# Patient Record
Sex: FEMALE | Race: WHITE | Employment: UNEMPLOYED | URBAN - METROPOLITAN AREA
[De-identification: names, ages, dates, MRNs, and addresses within clinical notes are randomized per-mention and may not be internally consistent; named-entity substitution may affect disease eponyms.]

---

## 2017-11-03 ENCOUNTER — HOSPITAL ENCOUNTER (EMERGENCY)
Age: 32
Discharge: HOME | End: 2017-11-03
Payer: SELF-PAY

## 2017-11-03 VITALS
BODY MASS INDEX: 27.77 KG/M2 | OXYGEN SATURATION: 97 % | RESPIRATION RATE: 16 BRPM | HEIGHT: 72 IN | DIASTOLIC BLOOD PRESSURE: 71 MMHG | TEMPERATURE: 97.7 F | HEART RATE: 105 BPM | SYSTOLIC BLOOD PRESSURE: 133 MMHG | WEIGHT: 205 LBS

## 2017-11-03 DIAGNOSIS — L03.115 CELLULITIS OF RIGHT LOWER EXTREMITY: Primary | ICD-10-CM

## 2017-11-03 PROCEDURE — 99283 EMERGENCY DEPT VISIT LOW MDM: CPT

## 2017-11-03 RX ORDER — METHADONE HYDROCHLORIDE 10 MG/1
30 TABLET ORAL
COMMUNITY
End: 2018-03-02

## 2017-11-03 RX ORDER — CLINDAMYCIN HYDROCHLORIDE 300 MG/1
300 CAPSULE ORAL 4 TIMES DAILY
Qty: 28 CAP | Refills: 0 | Status: SHIPPED | OUTPATIENT
Start: 2017-11-03 | End: 2017-11-10

## 2017-11-03 NOTE — ED PROVIDER NOTES
HPI Comments: 39-year-old female with redness to the right lateral leg from mid thigh to knee. onset 3 days ago. Patient injects methamphetamine track marks all over her body. Patient is a 34 y.o. female presenting with skin problem. The history is provided by the patient. Skin Problem    This is a new problem. The current episode started more than 2 days ago. The problem has been gradually worsening. Associated with: illicit drug injection. There has been no fever. The rash is present on the right upper leg. The pain is at a severity of 9/10. The pain is moderate. The pain has been constant since onset. Associated symptoms include pain. Treatments tried: left over Keflex. The treatment provided no relief. Past Medical History:   Diagnosis Date    Hypertension     Neurological disorder     migraines        Past Surgical History:   Procedure Laterality Date    HX APPENDECTOMY      HX GYN               History reviewed. No pertinent family history. Social History     Social History    Marital status:      Spouse name: N/A    Number of children: N/A    Years of education: N/A     Occupational History    Not on file. Social History Main Topics    Smoking status: Current Every Day Smoker     Packs/day: 1.00    Smokeless tobacco: Never Used    Alcohol use Yes      Comment: rarely    Drug use: Yes     Special: Heroin, Cocaine, Methamphetamines    Sexual activity: Yes     Partners: Female, Male     Birth control/ protection: Condom     Other Topics Concern    Not on file     Social History Narrative         ALLERGIES: Sulfa (sulfonamide antibiotics)    Review of Systems   Constitutional: Negative. Negative for activity change. HENT: Negative. Eyes: Negative. Respiratory: Negative. Cardiovascular: Negative. Gastrointestinal: Negative. Genitourinary: Negative. Musculoskeletal: Negative. Skin: Negative. Neurological: Negative. Psychiatric/Behavioral: Negative. All other systems reviewed and are negative. Vitals:    11/03/17 0217   BP: 156/90   Pulse: (!) 108   Resp: 16   Temp: 97.7 °F (36.5 °C)   SpO2: 99%   Weight: 93 kg (205 lb)   Height: 6' (1.829 m)            Physical Exam   Constitutional: She is oriented to person, place, and time. She appears well-developed and well-nourished. No distress. HENT:   Head: Normocephalic and atraumatic. Right Ear: External ear normal.   Left Ear: External ear normal.   Nose: Nose normal.   Mouth/Throat: Oropharynx is clear and moist. No oropharyngeal exudate. Eyes: Conjunctivae and EOM are normal. Pupils are equal, round, and reactive to light. Right eye exhibits no discharge. Left eye exhibits no discharge. No scleral icterus. Neck: Normal range of motion. Neck supple. No JVD present. No tracheal deviation present. Cardiovascular: Normal rate, regular rhythm and intact distal pulses. Pulmonary/Chest: Effort normal and breath sounds normal. No stridor. No respiratory distress. She has no wheezes. She exhibits no tenderness. Abdominal: Soft. Bowel sounds are normal. She exhibits no distension and no mass. There is no tenderness. Musculoskeletal: Normal range of motion. She exhibits tenderness. She exhibits no edema. Right knee: Tenderness found. Legs:  Neurological: She is alert and oriented to person, place, and time. No cranial nerve deficit. Skin: Skin is warm and dry. No rash noted. She is not diaphoretic. No erythema. No pallor. Psychiatric: She has a normal mood and affect. Her behavior is normal. Thought content normal.   Nursing note and vitals reviewed. MDM  Number of Diagnoses or Management Options  Cellulitis of right lower extremity:   Diagnosis management comments: Cellulitis does not seem to involve the knee joint. There is superficial this time. Multiple track marks up and down her legs. Patient admits to injecting methamphetamine. We'll treat for MRSA cellulitis with clindamycin as the patient has a sulfa allergy. Warm compress the leg. .  Due to the illicit drug use and habit of injecting drugs no pain medicine will be given at this time she may use Tylenol or Motrin.     ED Course       Procedures

## 2017-11-03 NOTE — DISCHARGE INSTRUCTIONS
Cellulitis: Care Instructions  Your Care Instructions    Cellulitis is a skin infection. It often occurs after a break in the skin from a scrape, cut, bite, or puncture, or after a rash. The doctor has checked you carefully, but problems can develop later. If you notice any problems or new symptoms, get medical treatment right away. Follow-up care is a key part of your treatment and safety. Be sure to make and go to all appointments, and call your doctor if you are having problems. It's also a good idea to know your test results and keep a list of the medicines you take. How can you care for yourself at home? · Take your antibiotics as directed. Do not stop taking them just because you feel better. You need to take the full course of antibiotics. · Prop up the infected area on pillows to reduce pain and swelling. Try to keep the area above the level of your heart as often as you can. · If your doctor told you how to care for your wound, follow your doctor's instructions. If you did not get instructions, follow this general advice:  ¨ Wash the wound with clean water 2 times a day. Don't use hydrogen peroxide or alcohol, which can slow healing. ¨ You may cover the wound with a thin layer of petroleum jelly, such as Vaseline, and a nonstick bandage. ¨ Apply more petroleum jelly and replace the bandage as needed. · Be safe with medicines. Take pain medicines exactly as directed. ¨ If the doctor gave you a prescription medicine for pain, take it as prescribed. ¨ If you are not taking a prescription pain medicine, ask your doctor if you can take an over-the-counter medicine. To prevent cellulitis in the future  · Try to prevent cuts, scrapes, or other injuries to your skin. Cellulitis most often occurs where there is a break in the skin. · If you get a scrape, cut, mild burn, or bite, wash the wound with clean water as soon as you can to help avoid infection.  Don't use hydrogen peroxide or alcohol, which can slow healing. · If you have swelling in your legs (edema), support stockings and good skin care may help prevent leg sores and cellulitis. · Take care of your feet, especially if you have diabetes or other conditions that increase the risk of infection. Wear shoes and socks. Do not go barefoot. If you have athlete's foot or other skin problems on your feet, talk to your doctor about how to treat them. When should you call for help? Call your doctor now or seek immediate medical care if:  ? · You have signs that your infection is getting worse, such as:  ¨ Increased pain, swelling, warmth, or redness. ¨ Red streaks leading from the area. ¨ Pus draining from the area. ¨ A fever. ? · You get a rash. ? Watch closely for changes in your health, and be sure to contact your doctor if:  ? · You are not getting better after 1 day (24 hours). ? · You do not get better as expected. Where can you learn more? Go to http://matt-yu.info/. [place] in the search box to learn more about \"Cellulitis: Care Instructions. \"  Current as of: October 13, 2016  Content Version: 11.4  © 9061-7462 [employer]. Care instructions adapted under license by [employer] (which disclaims liability or warranty for this information). If you have questions about a medical condition or this instruction, always ask your healthcare professional. [place] [address] any warranty or liability for your use of this information.

## 2017-11-03 NOTE — ED NOTES
I have reviewed discharge instructions with the patient. The patient verbalized understanding. Patient left ED via Discharge Method: wheelchair to Home with boyfriend    Opportunity for questions and clarification provided. Patient given 1 scripts.

## 2017-11-03 NOTE — ED TRIAGE NOTES
Pt states she \"does meth\" and believes that is the reason for the large area of redness and swelling on upper right leg

## 2017-11-08 ENCOUNTER — HOSPITAL ENCOUNTER (EMERGENCY)
Age: 32
Discharge: HOME | End: 2017-11-08
Attending: EMERGENCY MEDICINE
Payer: SELF-PAY

## 2017-11-08 VITALS
SYSTOLIC BLOOD PRESSURE: 147 MMHG | OXYGEN SATURATION: 99 % | TEMPERATURE: 97.7 F | HEIGHT: 72 IN | DIASTOLIC BLOOD PRESSURE: 84 MMHG | RESPIRATION RATE: 18 BRPM | HEART RATE: 112 BPM

## 2017-11-08 DIAGNOSIS — L02.415 ABSCESS OF RIGHT KNEE: Primary | ICD-10-CM

## 2017-11-08 PROCEDURE — 75810000289 HC I&D ABSCESS SIMP/COMP/MULT: Performed by: EMERGENCY MEDICINE

## 2017-11-08 PROCEDURE — 74011250637 HC RX REV CODE- 250/637: Performed by: EMERGENCY MEDICINE

## 2017-11-08 PROCEDURE — 99284 EMERGENCY DEPT VISIT MOD MDM: CPT | Performed by: EMERGENCY MEDICINE

## 2017-11-08 PROCEDURE — 77030019895 HC PCKNG STRP IODO -A

## 2017-11-08 RX ORDER — IBUPROFEN 800 MG/1
800 TABLET ORAL
Status: COMPLETED | OUTPATIENT
Start: 2017-11-08 | End: 2017-11-08

## 2017-11-08 RX ADMIN — IBUPROFEN 800 MG: 800 TABLET, FILM COATED ORAL at 01:59

## 2017-11-08 NOTE — DISCHARGE INSTRUCTIONS
Continue taking antibiotics. Recheck here in 2 days. Crutches to decrease weightbearing on right knee. Recheck sooner for high fever       Skin Abscess: Care Instructions  Your Care Instructions    A skin abscess is a bacterial infection that forms a pocket of pus. A boil is a kind of skin abscess. The doctor may have cut an opening in the abscess so that the pus can drain out. You may have gauze in the cut so that the abscess will stay open and keep draining. You may need antibiotics. You will need to follow up with your doctor to make sure the infection has gone away. The doctor has checked you carefully, but problems can develop later. If you notice any problems or new symptoms, get medical treatment right away. Follow-up care is a key part of your treatment and safety. Be sure to make and go to all appointments, and call your doctor if you are having problems. It's also a good idea to know your test results and keep a list of the medicines you take. How can you care for yourself at home? · Apply warm and dry compresses, a heating pad set on low, or a hot water bottle 3 or 4 times a day for pain. Keep a cloth between the heat source and your skin. · If your doctor prescribed antibiotics, take them as directed. Do not stop taking them just because you feel better. You need to take the full course of antibiotics. · Take pain medicines exactly as directed. ¨ If the doctor gave you a prescription medicine for pain, take it as prescribed. ¨ If you are not taking a prescription pain medicine, ask your doctor if you can take an over-the-counter medicine. · Keep your bandage clean and dry. Change the bandage whenever it gets wet or dirty, or at least one time a day. · If the abscess was packed with gauze:  ¨ Keep follow-up appointments to have the gauze changed or removed. If the doctor instructed you to remove the gauze, gently pull out all of the gauze when your doctor tells you to.   ¨ After the gauze is removed, soak the area in warm water for 15 to 20 minutes 2 times a day, until the wound closes. When should you call for help? Call your doctor now or seek immediate medical care if:  ? · You have signs of worsening infection, such as:  ¨ Increased pain, swelling, warmth, or redness. ¨ Red streaks leading from the infected skin. ¨ Pus draining from the wound. ¨ A fever. ? Watch closely for changes in your health, and be sure to contact your doctor if:  ? · You do not get better as expected. Where can you learn more? Go to http://matt-yu.info/. Enter S958 in the search box to learn more about \"Skin Abscess: Care Instructions. \"  Current as of: October 13, 2016  Content Version: 11.4  © 5601-3247 [employer]. Care instructions adapted under license by [employer] (which disclaims liability or warranty for this information). If you have questions about a medical condition or this instruction, always ask your healthcare professional. [address] any warranty or liability for your use of this information.

## 2017-11-08 NOTE — ED NOTES
I have reviewed discharge instructions with the patient. The patient verbalized understanding. Patient left ED via Discharge Method: ambulatory to Home with ( family, self, ). Opportunity for questions and clarification provided. Patient given 0 scripts.

## 2017-11-08 NOTE — ED PROVIDER NOTES
HPI Comments: 34-year-old female seen here a few days ago for cellulitis of her left leg suspected to be due to illicit drug use. Placed on antibiotics. She states she's taken these but she's had more pain and swelling. Patient is a 34 y.o. female presenting with skin problem. The history is provided by the patient. Skin Problem    This is a new problem. The current episode started more than 2 days ago. The problem has been gradually worsening. There has been no fever. The rash is present on the right upper leg. The pain is moderate. Associated symptoms include pain. Pertinent negatives include no blisters and no weeping. She has tried antibiotic for the symptoms. Past Medical History:   Diagnosis Date    Hypertension     Neurological disorder     migraines        Past Surgical History:   Procedure Laterality Date    HX APPENDECTOMY      HX GYN               History reviewed. No pertinent family history. Social History     Social History    Marital status:      Spouse name: N/A    Number of children: N/A    Years of education: N/A     Occupational History    Not on file. Social History Main Topics    Smoking status: Current Every Day Smoker     Packs/day: 1.00    Smokeless tobacco: Never Used    Alcohol use Yes      Comment: rarely    Drug use: Yes     Special: Heroin, Cocaine, Methamphetamines    Sexual activity: Yes     Partners: Female, Male     Birth control/ protection: Condom     Other Topics Concern    Not on file     Social History Narrative         ALLERGIES: Latex and Sulfa (sulfonamide antibiotics)    Review of Systems   Constitutional: Negative for chills and fever. HENT: Negative for ear pain. Cardiovascular: Negative for chest pain. Gastrointestinal: Negative for diarrhea and vomiting. Musculoskeletal: Negative for back pain and neck pain. Skin: Positive for wound. Negative for color change and rash.    All other systems reviewed and are negative. There were no vitals filed for this visit. Physical Exam   Constitutional: She appears well-developed and well-nourished. Musculoskeletal:        Legs:  Skin: Skin is warm and dry. Nursing note and vitals reviewed. MDM  Number of Diagnoses or Management Options  Diagnosis management comments: Apparent abscess    Risk of Complications, Morbidity, and/or Mortality  Presenting problems: low  Diagnostic procedures: minimal  Management options: low    Patient Progress  Patient progress: improved    ED Course       I&D Abcess Complex  Date/Time: 11/8/2017 1:47 AM  Performed by: Giana Roper  Authorized by: Giana Roper     Consent:     Consent obtained:  Verbal  Location:     Type:  Abscess    Location:  Lower extremity    Lower extremity location:  Knee    Knee location:  R knee  Pre-procedure details:     Skin preparation:  Betadine  Anesthesia (see MAR for exact dosages): Anesthesia method:  Local infiltration    Local anesthetic:  Lidocaine 1% w/o epi  Procedure type:     Complexity:  Complex  Procedure details:     Incision types:  Single straight    Wound management:  Probed and deloculated    Drainage:  Purulent    Drainage amount: Moderate    Wound treatment:  Wound left open    Packing materials:  1/4 in gauze  Post-procedure details:     Patient tolerance of procedure:   Tolerated well, no immediate complications

## 2017-11-08 NOTE — ED NOTES
Wound cleansed, neosporing placed. Non-stick dressing then extra wilton for reinforcement. Coban aplied.

## 2017-11-09 PROCEDURE — 99282 EMERGENCY DEPT VISIT SF MDM: CPT

## 2017-11-10 ENCOUNTER — HOSPITAL ENCOUNTER (EMERGENCY)
Age: 32
Discharge: HOME | End: 2017-11-10
Payer: SELF-PAY

## 2017-11-10 VITALS
HEART RATE: 112 BPM | HEIGHT: 72 IN | DIASTOLIC BLOOD PRESSURE: 87 MMHG | TEMPERATURE: 98.2 F | OXYGEN SATURATION: 100 % | RESPIRATION RATE: 20 BRPM | SYSTOLIC BLOOD PRESSURE: 144 MMHG | BODY MASS INDEX: 27.77 KG/M2 | WEIGHT: 205 LBS

## 2017-11-10 DIAGNOSIS — Z51.89 WOUND CHECK, ABSCESS: Primary | ICD-10-CM

## 2017-11-10 DIAGNOSIS — F19.90 ILLICIT DRUG USE, CONTINUOUS: ICD-10-CM

## 2017-11-10 RX ORDER — CLINDAMYCIN HYDROCHLORIDE 300 MG/1
300 CAPSULE ORAL 4 TIMES DAILY
Qty: 28 CAP | Refills: 0 | Status: SHIPPED | OUTPATIENT
Start: 2017-11-10 | End: 2017-11-17

## 2017-11-10 NOTE — DISCHARGE INSTRUCTIONS
Learning About Drug Misuse  What is drug misuse? Drug misuse means using drugs in a way that harms you or causes you to harm others. Your doctor may call it substance use disorder or drug abuse. It's possible to misuse almost any type of drug, including illegal drugs, prescription drugs, and over-the-counter drugs. An overdose happens when a person takes more than the normal or recommended amount of a drug. An overdose can result in harmful symptoms or death. Could you have a problem? If there's a chance you may be misusing drugs, it's important to find out. So ask yourself a few questions. · Do you spend a lot of time thinking about your medicine or other drug-getting it and using it? Has that taken the place of other things you used to enjoy? · Have you had problems with your family or friends, or at work, because of your use? · Do you use drugs even when you've told yourself you won't? Do you think you might have a problem? If you do, then you've just taken an important first step. Many people have overcome this problem. And most of them started by reaching out to others, like caring friends or family, their doctor, or a support group. How is drug misuse treated? If you think you may have a problem with drugs, talk to your doctor. You and your doctor can decide whether you have a problem and what type of treatment might help you. You may need to stay in a hospital at first, so that you can be treated for withdrawal symptoms. One of the goals of treatment is helping you get used to life without the drug. Counseling can help you prepare for people or situations that might tempt you to start using again. You can practice these skills through one-on-one counseling, family therapy, or group therapy. Therapy may be part of inpatient treatment, where you stay in a treatment center.  Or it may be part of outpatient treatment, where you can fit your therapy around your job or other responsibilities. Another goal of treatment is getting ongoing support for your drug-free life. Many people find support by going to meetings like Narcotics Anonymous or SMART Recovery. This type of support can help you feel less alone and more motivated to stay drug-free. You might talk to your doctor or do an online search for local treatment programs. Or you might tell a friend or loved one that you need help. Follow-up care is a key part of your treatment and safety. Be sure to make and go to all appointments, and call your doctor if you are having problems. It's also a good idea to know your test results and keep a list of the medicines you take. Where can you learn more? Go to http://matt-yu.info/. Enter [phone] in the search box to learn more about \"Learning About Drug Misuse. \"  Current as of: November 3, 2016  Content Version: 11.4  © 5079-3470 [employer]. Care instructions adapted under license by [employer] (which disclaims liability or warranty for this information). If you have questions about a medical condition or this instruction, always ask your healthcare professional. [place] [address] any warranty or liability for your use of this information.

## 2017-11-10 NOTE — ED TRIAGE NOTES
Pt was seen and treated on Tuesday and I and D of an abscess on the right knee done.   Here today for packing removal

## 2017-11-10 NOTE — ED PROVIDER NOTES
HPI Comments: 53-year-old female here for wound check. Patient is a illicit drug use or that injects in multiple sites on her body. She had gotten cellulitis which then developed into an abscess. Patient had a I&D done 2 days ago and is here for packing removal and repacking. Patient is a 34 y.o. female presenting with wound check. The history is provided by the patient. Wound Check    This is a recurrent problem. The current episode started more than 2 days ago. The problem occurs constantly. The problem has been gradually improving. The pain is present in the right knee. The pain is at a severity of 4/10. The pain is mild. Pertinent negatives include no numbness and full range of motion. She has tried nothing for the symptoms. Past Medical History:   Diagnosis Date    Hypertension     Neurological disorder     migraines        Past Surgical History:   Procedure Laterality Date    HX APPENDECTOMY      HX GYN               History reviewed. No pertinent family history. Social History     Social History    Marital status:      Spouse name: N/A    Number of children: N/A    Years of education: N/A     Occupational History    Not on file. Social History Main Topics    Smoking status: Current Every Day Smoker     Packs/day: 1.00    Smokeless tobacco: Never Used    Alcohol use Yes      Comment: rarely    Drug use: Yes     Special: Heroin, Cocaine, Methamphetamines    Sexual activity: Yes     Partners: Female, Male     Birth control/ protection: Condom     Other Topics Concern    Not on file     Social History Narrative         ALLERGIES: Latex and Sulfa (sulfonamide antibiotics)    Review of Systems   Constitutional: Negative. Negative for activity change. HENT: Negative. Eyes: Negative. Respiratory: Negative. Cardiovascular: Negative. Gastrointestinal: Negative. Genitourinary: Negative. Musculoskeletal: Negative. Skin: Negative.     Neurological: Negative. Negative for numbness. Psychiatric/Behavioral: Negative. All other systems reviewed and are negative. Vitals:    11/10/17 0003   BP: 144/87   Pulse: (!) 112   Resp: 20   Temp: 98.2 °F (36.8 °C)   SpO2: 100%   Weight: 93 kg (205 lb)   Height: 6' (1.829 m)            Physical Exam   Constitutional: She is oriented to person, place, and time. She appears well-developed and well-nourished. No distress. HENT:   Head: Normocephalic and atraumatic. Right Ear: External ear normal.   Left Ear: External ear normal.   Nose: Nose normal.   Mouth/Throat: Oropharynx is clear and moist. No oropharyngeal exudate. Eyes: Conjunctivae and EOM are normal. Pupils are equal, round, and reactive to light. Right eye exhibits no discharge. Left eye exhibits no discharge. No scleral icterus. Neck: Normal range of motion. Neck supple. No JVD present. No tracheal deviation present. Cardiovascular: Normal rate, regular rhythm and intact distal pulses. Pulmonary/Chest: Effort normal and breath sounds normal. No stridor. No respiratory distress. She has no wheezes. She exhibits no tenderness. Abdominal: Soft. Bowel sounds are normal. She exhibits no distension and no mass. There is no tenderness. Musculoskeletal: Normal range of motion. She exhibits no edema or tenderness. Neurological: She is alert and oriented to person, place, and time. No cranial nerve deficit. Skin: Skin is warm and dry. No rash noted. She is not diaphoretic. There is erythema. No pallor. Psychiatric: She has a normal mood and affect. Her behavior is normal. Thought content normal.   Nursing note and vitals reviewed. MDM  Number of Diagnoses or Management Options  Wound check, abscess: established and worsening  Diagnosis management comments: Packing  Removed wound irrigated anesthesia injected wound repacked. Return in 3 days for recheck. Discussion with patient about her illicit drug use. She has not quit using. She used today. She is not interested this point and rehabilitation. Patient has multiple excuses. Patient positive continued to engage in this activity she will eventually lead to her demise.     ED Course       Procedures

## 2017-11-10 NOTE — ED NOTES
I have reviewed discharge instructions with the patient. The patient verbalized understanding. Patient left ED via Discharge Method: ambulatory to Home with alone). Opportunity for questions and clarification provided. Patient given 1 scripts.

## 2017-11-13 ENCOUNTER — HOSPITAL ENCOUNTER (EMERGENCY)
Age: 32
Discharge: HOME | End: 2017-11-13
Attending: EMERGENCY MEDICINE
Payer: SELF-PAY

## 2017-11-13 VITALS
DIASTOLIC BLOOD PRESSURE: 75 MMHG | SYSTOLIC BLOOD PRESSURE: 146 MMHG | OXYGEN SATURATION: 99 % | RESPIRATION RATE: 16 BRPM | WEIGHT: 205 LBS | HEIGHT: 71 IN | HEART RATE: 120 BPM | BODY MASS INDEX: 28.7 KG/M2 | TEMPERATURE: 97.9 F

## 2017-11-13 DIAGNOSIS — Z51.89 WOUND CHECK, ABSCESS: Primary | ICD-10-CM

## 2017-11-13 DIAGNOSIS — F19.90 ACTIVE INTRAVENOUS DRUG USE: ICD-10-CM

## 2017-11-13 PROCEDURE — 99282 EMERGENCY DEPT VISIT SF MDM: CPT | Performed by: EMERGENCY MEDICINE

## 2017-11-13 NOTE — ED PROVIDER NOTES
HPI Comments: 65-year-old female with history of heroin use as well as abscess to right lateral knee. She has been treated with antibiotics and have the wound repacked 3 days ago. He is she is here for packing removal.  Denies any significant fevers, vomiting. Overall she states the wound is looking improved with no evidence of cellulitis. Patient is a 34 y.o. female presenting with wound check. The history is provided by the patient. No  was used. Wound Check    Pertinent negatives include no back pain. Past Medical History:   Diagnosis Date    Anxiety     Hypertension     Methamphetamine abuse     Neurological disorder     migraines     Polysubstance abuse        Past Surgical History:   Procedure Laterality Date    HX APPENDECTOMY      HX GYN               No family history on file. Social History     Social History    Marital status:      Spouse name: N/A    Number of children: N/A    Years of education: N/A     Occupational History    Not on file. Social History Main Topics    Smoking status: Current Every Day Smoker     Packs/day: 1.00    Smokeless tobacco: Never Used    Alcohol use Yes      Comment: rarely    Drug use: Yes     Special: Heroin, Cocaine, Methamphetamines    Sexual activity: Yes     Partners: Female, Male     Birth control/ protection: Condom     Other Topics Concern    Not on file     Social History Narrative         ALLERGIES: Latex and Sulfa (sulfonamide antibiotics)    Review of Systems   Constitutional: Negative for fatigue and fever. HENT: Negative for sore throat. Eyes: Negative for pain. Respiratory: Negative for cough, chest tightness and shortness of breath. Cardiovascular: Negative for leg swelling. Gastrointestinal: Negative for abdominal pain. Genitourinary: Negative for dysuria. Musculoskeletal: Negative for back pain. Skin: Positive for wound.    Neurological: Negative for syncope and headaches. Vitals:    11/13/17 0450   BP: 146/75   Pulse: (!) 120   Resp: 16   Temp: 97.9 °F (36.6 °C)   SpO2: 99%   Weight: 93 kg (205 lb)   Height: 5' 11\" (1.803 m)            Physical Exam   Constitutional: She is oriented to person, place, and time. She appears well-developed and well-nourished. No distress. HENT:   Head: Normocephalic and atraumatic. Eyes: Conjunctivae and EOM are normal. Pupils are equal, round, and reactive to light. Neck: Normal range of motion. Neck supple. Cardiovascular: Normal rate, regular rhythm and normal heart sounds. Pulmonary/Chest: Effort normal and breath sounds normal. No respiratory distress. She has no wheezes. She has no rales. Abdominal: Soft. She exhibits no distension. There is no tenderness. There is no rebound. Musculoskeletal: Normal range of motion. She exhibits no edema or tenderness. Legs:  Neurological: She is alert and oriented to person, place, and time. Skin: Skin is warm and dry. No rash noted. She is not diaphoretic. Psychiatric: She has a normal mood and affect. Her behavior is normal.   Vitals reviewed. MDM  Number of Diagnoses or Management Options  Active intravenous drug use: new and does not require workup  Wound check, abscess: new and does not require workup  Diagnosis management comments: Patient has no systemic features of infection. She does not appear toxic. Wound seems to be healing nicely. She has clindamycin she reports she is currently taking. We will discharge to home. Advised to try to stop IV drug use if possible. She has tried to quit multiple times in the past unsuccessfully. She does not want a referral for a rehabilitation facility at this time.   Return precautions discussed       Amount and/or Complexity of Data Reviewed  Review and summarize past medical records: yes    Risk of Complications, Morbidity, and/or Mortality  Presenting problems: low  Diagnostic procedures: low  Management options: low    Patient Progress  Patient progress: improved    ED Course       Procedures

## 2017-11-13 NOTE — ED NOTES
I have reviewed discharge instructions with the patient. The patient verbalized understanding. Patient left ED via Discharge Method: ambulatory to Home). Opportunity for questions and clarification provided. Patient given 0 scripts.

## 2017-11-13 NOTE — ED TRIAGE NOTES
Pt presents to ER for wound to R knee recheck w/ packing removal, pt reports missing yesterday's Ax due to being in long-term.

## 2018-03-02 ENCOUNTER — HOSPITAL ENCOUNTER (EMERGENCY)
Age: 33
Discharge: HOME | End: 2018-03-02
Payer: SELF-PAY

## 2018-03-02 VITALS
TEMPERATURE: 100.6 F | HEIGHT: 72 IN | WEIGHT: 213.2 LBS | BODY MASS INDEX: 28.88 KG/M2 | DIASTOLIC BLOOD PRESSURE: 67 MMHG | HEART RATE: 88 BPM | RESPIRATION RATE: 24 BRPM | OXYGEN SATURATION: 99 % | SYSTOLIC BLOOD PRESSURE: 127 MMHG

## 2018-03-02 DIAGNOSIS — J20.9 ACUTE BRONCHITIS, UNSPECIFIED ORGANISM: Primary | ICD-10-CM

## 2018-03-02 PROCEDURE — 77030013140 HC MSK NEB VYRM -A

## 2018-03-02 PROCEDURE — 99283 EMERGENCY DEPT VISIT LOW MDM: CPT

## 2018-03-02 PROCEDURE — 74011636637 HC RX REV CODE- 636/637

## 2018-03-02 PROCEDURE — 74011000250 HC RX REV CODE- 250

## 2018-03-02 PROCEDURE — 94640 AIRWAY INHALATION TREATMENT: CPT

## 2018-03-02 RX ORDER — PREDNISONE 10 MG/1
TABLET ORAL
Qty: 21 TAB | Refills: 0 | Status: SHIPPED | OUTPATIENT
Start: 2018-03-02 | End: 2018-08-30 | Stop reason: CLARIF

## 2018-03-02 RX ORDER — BENZONATATE 100 MG/1
100 CAPSULE ORAL
Qty: 30 CAP | Refills: 0 | Status: SHIPPED | OUTPATIENT
Start: 2018-03-02 | End: 2018-03-09

## 2018-03-02 RX ORDER — IPRATROPIUM BROMIDE AND ALBUTEROL SULFATE 2.5; .5 MG/3ML; MG/3ML
3 SOLUTION RESPIRATORY (INHALATION)
Status: COMPLETED | OUTPATIENT
Start: 2018-03-02 | End: 2018-03-02

## 2018-03-02 RX ORDER — AZITHROMYCIN 250 MG/1
TABLET, FILM COATED ORAL
Qty: 6 TAB | Refills: 0 | Status: SHIPPED | OUTPATIENT
Start: 2018-03-02 | End: 2018-03-07

## 2018-03-02 RX ORDER — PREDNISONE 20 MG/1
60 TABLET ORAL
Status: COMPLETED | OUTPATIENT
Start: 2018-03-02 | End: 2018-03-02

## 2018-03-02 RX ADMIN — IPRATROPIUM BROMIDE AND ALBUTEROL SULFATE 3 ML: 2.5; .5 SOLUTION RESPIRATORY (INHALATION) at 02:18

## 2018-03-02 RX ADMIN — PREDNISONE 60 MG: 20 TABLET ORAL at 02:57

## 2018-03-02 NOTE — DISCHARGE INSTRUCTIONS
Bronchitis: Care Instructions  Your Care Instructions    Bronchitis is inflammation of the bronchial tubes, which carry air to the lungs. The tubes swell and produce mucus, or phlegm. The mucus and inflamed bronchial tubes make you cough. You may have trouble breathing. Most cases of bronchitis are caused by viruses like those that cause colds. Antibiotics usually do not help and they may be harmful. Bronchitis usually develops rapidly and lasts about 2 to 3 weeks in otherwise healthy people. Follow-up care is a key part of your treatment and safety. Be sure to make and go to all appointments, and call your doctor if you are having problems. It's also a good idea to know your test results and keep a list of the medicines you take. How can you care for yourself at home? · Take all medicines exactly as prescribed. Call your doctor if you think you are having a problem with your medicine. · Get some extra rest.  · Take an over-the-counter pain medicine, such as acetaminophen (Tylenol), ibuprofen (Advil, Motrin), or naproxen (Aleve) to reduce fever and relieve body aches. Read and follow all instructions on the label. · Do not take two or more pain medicines at the same time unless the doctor told you to. Many pain medicines have acetaminophen, which is Tylenol. Too much acetaminophen (Tylenol) can be harmful. · Take an over-the-counter cough medicine that contains dextromethorphan to help quiet a dry, hacking cough so that you can sleep. Avoid cough medicines that have more than one active ingredient. Read and follow all instructions on the label. · Breathe moist air from a humidifier, hot shower, or sink filled with hot water. The heat and moisture will thin mucus so you can cough it out. · Do not smoke. Smoking can make bronchitis worse. If you need help quitting, talk to your doctor about stop-smoking programs and medicines. These can increase your chances of quitting for good.   When should you call for help? Call 911 anytime you think you may need emergency care. For example, call if:  ? · You have severe trouble breathing. ?Call your doctor now or seek immediate medical care if:  ? · You have new or worse trouble breathing. ? · You cough up dark brown or bloody mucus (sputum). ? · You have a new or higher fever. ? · You have a new rash. ? Watch closely for changes in your health, and be sure to contact your doctor if:  ? · You cough more deeply or more often, especially if you notice more mucus or a change in the color of your mucus. ? · You are not getting better as expected. Where can you learn more? Go to http://matt-yu.info/. Enter H333 in the search box to learn more about \"Bronchitis: Care Instructions. \"  Current as of: May 12, 2017  Content Version: 11.4  © 9270-0959 [employer]. Care instructions adapted under license by [employer] (which disclaims liability or warranty for this information). If you have questions about a medical condition or this instruction, always ask your healthcare professional. [address] any warranty or liability for your use of this information.

## 2018-03-02 NOTE — ED PROVIDER NOTES
HPI Comments: 69-year-old female with cough congestion and flulike symptoms. Onset 3 weeks. Patient was in the ED with her boyfriend is being seen as a patient decided she get checked today 2. Patient is a 34 y.o. female presenting with cough. Cough   This is a chronic problem. The current episode started more than 1 week ago. The problem occurs constantly. The problem has not changed since onset. The cough is productive of sputum. There has been no fever. Associated symptoms include myalgias, shortness of breath and wheezing. Pertinent negatives include no chest pain. She has tried nothing for the symptoms. She is a smoker. Past Medical History:   Diagnosis Date    Anxiety     Hypertension     Methamphetamine abuse     Neurological disorder     migraines     Polysubstance abuse        Past Surgical History:   Procedure Laterality Date    HX APPENDECTOMY      HX GYN               No family history on file. Social History     Social History    Marital status:      Spouse name: N/A    Number of children: N/A    Years of education: N/A     Occupational History    Not on file. Social History Main Topics    Smoking status: Current Every Day Smoker     Packs/day: 1.00    Smokeless tobacco: Never Used    Alcohol use Yes      Comment: rarely    Drug use: Yes     Special: Heroin, Cocaine, Methamphetamines    Sexual activity: Yes     Partners: Female, Male     Birth control/ protection: Condom     Other Topics Concern    Not on file     Social History Narrative         ALLERGIES: Latex and Sulfa (sulfonamide antibiotics)    Review of Systems   Constitutional: Negative. Negative for activity change. HENT: Negative. Eyes: Negative. Respiratory: Positive for cough, shortness of breath and wheezing. Cardiovascular: Negative. Negative for chest pain. Gastrointestinal: Negative. Genitourinary: Negative. Musculoskeletal: Positive for myalgias.    Skin: Negative. Neurological: Negative. Psychiatric/Behavioral: Negative. All other systems reviewed and are negative. There were no vitals filed for this visit. Physical Exam   Constitutional: She is oriented to person, place, and time. She appears well-developed and well-nourished. No distress. HENT:   Head: Normocephalic and atraumatic. Right Ear: External ear normal.   Left Ear: External ear normal.   Nose: Nose normal.   Mouth/Throat: Oropharynx is clear and moist. No oropharyngeal exudate. Eyes: Conjunctivae and EOM are normal. Pupils are equal, round, and reactive to light. Right eye exhibits no discharge. Left eye exhibits no discharge. No scleral icterus. Neck: Normal range of motion. Neck supple. No JVD present. No tracheal deviation present. Cardiovascular: Normal rate, regular rhythm and intact distal pulses. Pulmonary/Chest: Effort normal. No stridor. No respiratory distress. She has wheezes in the right middle field. She exhibits no tenderness. Abdominal: Soft. Bowel sounds are normal. She exhibits no distension and no mass. There is no tenderness. Musculoskeletal: Normal range of motion. She exhibits no edema or tenderness. Neurological: She is alert and oriented to person, place, and time. No cranial nerve deficit. Skin: Skin is warm and dry. No rash noted. She is not diaphoretic. No erythema. No pallor. Psychiatric: She has a normal mood and affect. Her behavior is normal. Thought content normal.   Nursing note and vitals reviewed. MDM  Number of Diagnoses or Management Options  Diagnosis management comments: Assessment acute bronchitis chronic smoking. Plan Zithromax steroids  Cough medicine.         ED Course       Procedures

## 2018-03-02 NOTE — ED NOTES
I have reviewed discharge instructions with the patient. The patient verbalized understanding. Patient left ED via Discharge Method: ambulatory to Home. Opportunity for questions and clarification provided. Patient given 3 scripts. To continue your aftercare when you leave the hospital, you may receive an automated call from our care team to check in on how you are doing. This is a free service and part of our promise to provide the best care and service to meet your aftercare needs.  If you have questions, or wish to unsubscribe from this service please call [phone]. Thank you for Choosing our [facility].

## 2018-03-11 ENCOUNTER — HOSPITAL ENCOUNTER (EMERGENCY)
Age: 33
Discharge: HOME | End: 2018-03-11
Attending: EMERGENCY MEDICINE
Payer: SELF-PAY

## 2018-03-11 ENCOUNTER — APPOINTMENT (OUTPATIENT)
Dept: GENERAL RADIOLOGY | Age: 33
End: 2018-03-11
Attending: EMERGENCY MEDICINE
Payer: SELF-PAY

## 2018-03-11 VITALS
RESPIRATION RATE: 18 BRPM | WEIGHT: 213 LBS | DIASTOLIC BLOOD PRESSURE: 76 MMHG | TEMPERATURE: 98.4 F | HEIGHT: 72 IN | HEART RATE: 109 BPM | OXYGEN SATURATION: 100 % | SYSTOLIC BLOOD PRESSURE: 142 MMHG | BODY MASS INDEX: 28.85 KG/M2

## 2018-03-11 DIAGNOSIS — R91.8 PULMONARY INFILTRATES ON CXR: ICD-10-CM

## 2018-03-11 DIAGNOSIS — R05.9 COUGH: Primary | ICD-10-CM

## 2018-03-11 LAB
ALBUMIN SERPL-MCNC: 2.7 G/DL (ref 3.5–5)
ALBUMIN/GLOB SERPL: 0.6 {RATIO} (ref 1.2–3.5)
ALP SERPL-CCNC: 100 U/L (ref 50–136)
ALT SERPL-CCNC: 32 U/L (ref 12–65)
ANION GAP SERPL CALC-SCNC: 11 MMOL/L (ref 7–16)
AST SERPL-CCNC: 26 U/L (ref 15–37)
BASOPHILS # BLD: 0 K/UL (ref 0–0.2)
BASOPHILS NFR BLD: 0 % (ref 0–2)
BILIRUB SERPL-MCNC: 0.5 MG/DL (ref 0.2–1.1)
BNP SERPL-MCNC: 580 PG/ML
BUN SERPL-MCNC: 9 MG/DL (ref 6–23)
CALCIUM SERPL-MCNC: 8.9 MG/DL (ref 8.3–10.4)
CHLORIDE SERPL-SCNC: 103 MMOL/L (ref 98–107)
CO2 SERPL-SCNC: 25 MMOL/L (ref 21–32)
CREAT SERPL-MCNC: 0.92 MG/DL (ref 0.6–1)
DIFFERENTIAL METHOD BLD: ABNORMAL
EOSINOPHIL # BLD: 0.1 K/UL (ref 0–0.8)
EOSINOPHIL NFR BLD: 1 % (ref 0.5–7.8)
ERYTHROCYTE [DISTWIDTH] IN BLOOD BY AUTOMATED COUNT: 14.8 % (ref 11.9–14.6)
GLOBULIN SER CALC-MCNC: 4.6 G/DL (ref 2.3–3.5)
GLUCOSE SERPL-MCNC: 185 MG/DL (ref 65–100)
HCT VFR BLD AUTO: 35.7 % (ref 35.8–46.3)
HGB BLD-MCNC: 11.3 G/DL (ref 11.7–15.4)
IMM GRANULOCYTES # BLD: 0 K/UL (ref 0–0.5)
IMM GRANULOCYTES NFR BLD AUTO: 0 % (ref 0–5)
LYMPHOCYTES # BLD: 1.5 K/UL (ref 0.5–4.6)
LYMPHOCYTES NFR BLD: 14 % (ref 13–44)
MCH RBC QN AUTO: 26 PG (ref 26.1–32.9)
MCHC RBC AUTO-ENTMCNC: 31.7 G/DL (ref 31.4–35)
MCV RBC AUTO: 82.1 FL (ref 79.6–97.8)
MONOCYTES # BLD: 0.6 K/UL (ref 0.1–1.3)
MONOCYTES NFR BLD: 5 % (ref 4–12)
NEUTS SEG # BLD: 8.5 K/UL (ref 1.7–8.2)
NEUTS SEG NFR BLD: 80 % (ref 43–78)
PLATELET # BLD AUTO: 340 K/UL (ref 150–450)
PMV BLD AUTO: 9.1 FL (ref 10.8–14.1)
POTASSIUM SERPL-SCNC: 4.4 MMOL/L (ref 3.5–5.1)
PROT SERPL-MCNC: 7.3 G/DL (ref 6.3–8.2)
RBC # BLD AUTO: 4.35 M/UL (ref 4.05–5.25)
SODIUM SERPL-SCNC: 139 MMOL/L (ref 136–145)
WBC # BLD AUTO: 10.8 K/UL (ref 4.3–11.1)

## 2018-03-11 PROCEDURE — 71046 X-RAY EXAM CHEST 2 VIEWS: CPT

## 2018-03-11 PROCEDURE — 80053 COMPREHEN METABOLIC PANEL: CPT | Performed by: EMERGENCY MEDICINE

## 2018-03-11 PROCEDURE — 94640 AIRWAY INHALATION TREATMENT: CPT

## 2018-03-11 PROCEDURE — 74011000250 HC RX REV CODE- 250: Performed by: EMERGENCY MEDICINE

## 2018-03-11 PROCEDURE — 99283 EMERGENCY DEPT VISIT LOW MDM: CPT | Performed by: EMERGENCY MEDICINE

## 2018-03-11 PROCEDURE — 94664 DEMO&/EVAL PT USE INHALER: CPT

## 2018-03-11 PROCEDURE — 85025 COMPLETE CBC W/AUTO DIFF WBC: CPT | Performed by: EMERGENCY MEDICINE

## 2018-03-11 PROCEDURE — 83880 ASSAY OF NATRIURETIC PEPTIDE: CPT | Performed by: EMERGENCY MEDICINE

## 2018-03-11 RX ORDER — CEPHALEXIN 500 MG/1
500 CAPSULE ORAL 3 TIMES DAILY
Qty: 30 CAP | Refills: 0 | Status: SHIPPED | OUTPATIENT
Start: 2018-03-11 | End: 2018-08-30 | Stop reason: CLARIF

## 2018-03-11 RX ORDER — ALBUTEROL SULFATE 90 UG/1
2 AEROSOL, METERED RESPIRATORY (INHALATION)
Qty: 1 INHALER | Refills: 0 | Status: SHIPPED | OUTPATIENT
Start: 2018-03-11 | End: 2018-08-30 | Stop reason: CLARIF

## 2018-03-11 RX ORDER — IPRATROPIUM BROMIDE AND ALBUTEROL SULFATE 2.5; .5 MG/3ML; MG/3ML
3 SOLUTION RESPIRATORY (INHALATION)
Status: COMPLETED | OUTPATIENT
Start: 2018-03-11 | End: 2018-03-11

## 2018-03-11 RX ADMIN — IPRATROPIUM BROMIDE AND ALBUTEROL SULFATE 3 ML: 2.5; .5 SOLUTION RESPIRATORY (INHALATION) at 10:46

## 2018-03-11 NOTE — ED NOTES
I have reviewed discharge instructions with the patient. The patient verbalized understanding. Patient left ED via Discharge Method: ambulatory to Home with self. Opportunity for questions and clarification provided. Patient given 2 scripts. To continue your aftercare when you leave the hospital, you may receive an automated call from our care team to check in on how you are doing. This is a free service and part of our promise to provide the best care and service to meet your aftercare needs.  If you have questions, or wish to unsubscribe from this service please call [phone]. Thank you for Choosing our [facility] Emergency Department.

## 2018-03-11 NOTE — ED PROVIDER NOTES
HPI Comments: 28-year-old female states she's been coughing for about a month. Bring up yellow sputum. She's had increasing shortness of breath but possibly a little bit of fever and some wheezing over the last week. No vomiting or diarrhea. .  No hemoptysis no chest or back pain. No swelling. States remote history of hypertension but not needing any medications now. Patient is a 34 y.o. female presenting with cough. The history is provided by the patient. Cough   This is a new problem. The current episode started more than 1 week ago. The problem occurs every few minutes. The cough is productive of sputum. There has been no fever. Associated symptoms include chills, shortness of breath and wheezing. Pertinent negatives include no chest pain, no sweats, no headaches, no nausea and no vomiting. She has tried steroids and antibiotics for the symptoms. She is a smoker. Her past medical history does not include pneumonia, COPD, asthma or CHF. Past Medical History:   Diagnosis Date    Anxiety     Hypertension     Methamphetamine abuse     Neurological disorder     migraines     Polysubstance abuse        Past Surgical History:   Procedure Laterality Date    HX APPENDECTOMY      HX GYN               No family history on file. Social History     Social History    Marital status:      Spouse name: N/A    Number of children: N/A    Years of education: N/A     Occupational History    Not on file.      Social History Main Topics    Smoking status: Current Every Day Smoker     Packs/day: 1.00    Smokeless tobacco: Never Used    Alcohol use Yes      Comment: rarely    Drug use: Yes     Special: Heroin, Cocaine, Methamphetamines    Sexual activity: Yes     Partners: Female, Male     Birth control/ protection: Condom     Other Topics Concern    Not on file     Social History Narrative         ALLERGIES: Latex and Sulfa (sulfonamide antibiotics)    Review of Systems Constitutional: Positive for chills. Negative for fever. Respiratory: Positive for cough, shortness of breath and wheezing. Cardiovascular: Negative for chest pain and palpitations. Gastrointestinal: Negative for abdominal pain, diarrhea, nausea and vomiting. Genitourinary: Negative for dysuria and flank pain. Musculoskeletal: Negative for back pain and neck pain. Skin: Negative for color change and rash. Neurological: Negative for syncope and headaches. All other systems reviewed and are negative. Vitals:    03/11/18 1003   BP: 147/75   Pulse: (!) 119   Resp: 20   Temp: 98.4 °F (36.9 °C)   SpO2: 99%   Weight: 96.6 kg (213 lb)   Height: 6' (1.829 m)            Physical Exam   Constitutional: She is oriented to person, place, and time. She appears well-developed and well-nourished. No distress. HENT:   Head: Normocephalic and atraumatic. Mouth/Throat: Oropharynx is clear and moist. No oropharyngeal exudate. Eyes: Conjunctivae and EOM are normal. Pupils are equal, round, and reactive to light. Neck: Normal range of motion. Neck supple. Cardiovascular: Normal rate, regular rhythm and intact distal pulses. No murmur heard. Pulmonary/Chest: No respiratory distress. She has wheezes (very scattered with occasional rhonchi). Abdominal: No hernia. Neurological: She is alert and oriented to person, place, and time. Gait normal.   Nl speech   Skin: Skin is warm and dry. Some skin lesions could be consistent with meth and IV drug abuse   Psychiatric: She has a normal mood and affect. Her speech is normal.   Nursing note and vitals reviewed. MDM  Number of Diagnoses or Management Options  Diagnosis management comments: Check chest x-ray. The period screening blood work. Suspect some persistent inflammation. Check for him congestive heart failure or septic emboli given history of IV drug abuse.        Amount and/or Complexity of Data Reviewed  Clinical lab tests: ordered and reviewed  Tests in the radiology section of CPT®: ordered and reviewed  Independent visualization of images, tracings, or specimens: yes    Risk of Complications, Morbidity, and/or Mortality  Presenting problems: moderate  Diagnostic procedures: minimal  Management options: low    Patient Progress  Patient progress: stable        ED Course       Procedures      Results Include:    Recent Results (from the past 24 hour(s))   CBC WITH AUTOMATED DIFF    Collection Time: 03/11/18 10:42 AM   Result Value Ref Range    WBC 10.8 4.3 - 11.1 K/uL    RBC 4.35 4.05 - 5.25 M/uL    HGB 11.3 (L) 11.7 - 15.4 g/dL    HCT 35.7 (L) 35.8 - 46.3 %    MCV 82.1 79.6 - 97.8 FL    MCH 26.0 (L) 26.1 - 32.9 PG    MCHC 31.7 31.4 - 35.0 g/dL    RDW 14.8 (H) 11.9 - 14.6 %    PLATELET 744 146 - 954 K/uL    MPV 9.1 (L) 10.8 - 14.1 FL    DF AUTOMATED      NEUTROPHILS 80 (H) 43 - 78 %    LYMPHOCYTES 14 13 - 44 %    MONOCYTES 5 4.0 - 12.0 %    EOSINOPHILS 1 0.5 - 7.8 %    BASOPHILS 0 0.0 - 2.0 %    IMMATURE GRANULOCYTES 0 0.0 - 5.0 %    ABS. NEUTROPHILS 8.5 (H) 1.7 - 8.2 K/UL    ABS. LYMPHOCYTES 1.5 0.5 - 4.6 K/UL    ABS. MONOCYTES 0.6 0.1 - 1.3 K/UL    ABS. EOSINOPHILS 0.1 0.0 - 0.8 K/UL    ABS. BASOPHILS 0.0 0.0 - 0.2 K/UL    ABS. IMM. GRANS. 0.0 0.0 - 0.5 K/UL   METABOLIC PANEL, COMPREHENSIVE    Collection Time: 03/11/18 10:42 AM   Result Value Ref Range    Sodium 139 136 - 145 mmol/L    Potassium 4.4 3.5 - 5.1 mmol/L    Chloride 103 98 - 107 mmol/L    CO2 25 21 - 32 mmol/L    Anion gap 11 7 - 16 mmol/L    Glucose 185 (H) 65 - 100 mg/dL    BUN 9 6 - 23 MG/DL    Creatinine 0.92 0.6 - 1.0 MG/DL    GFR est AA >60 >60 ml/min/1.73m2    GFR est non-AA >60 >60 ml/min/1.73m2    Calcium 8.9 8.3 - 10.4 MG/DL    Bilirubin, total 0.5 0.2 - 1.1 MG/DL    ALT (SGPT) 32 12 - 65 U/L    AST (SGOT) 26 15 - 37 U/L    Alk.  phosphatase 100 50 - 136 U/L    Protein, total 7.3 6.3 - 8.2 g/dL    Albumin 2.7 (L) 3.5 - 5.0 g/dL    Globulin 4.6 (H) 2.3 - 3.5 g/dL    A-G Ratio 0.6 (L) 1.2 - 3.5     BNP    Collection Time: 03/11/18 10:42 AM   Result Value Ref Range     pg/mL     Xr Chest Pa Lat    Result Date: 3/11/2018  Chest 2 view CLINICAL INDICATION: One month of persisting cough with acute severe dyspnea today, recent TB exposure COMPARISON: None TECHNIQUE: Upright PA and lateral views of the chest FINDINGS: Lung volumes are well inflated. Cardiac silhouette is borderline enlarged. Otherwise mediastinal and hilar contours within normal limits. The lungs demonstrate no dense consolidation, mass, pneumothorax, cavitary lesions, pleural effusions or pulmonary edema. There is mild reticular and groundglass opacity in both bases. No acute osseous abnormalities are seen. IMPRESSION: 1. No consolidation or evidence of active tuberculosis by radiograph. 2. Mild bibasilar infiltrates. More likely some infection as opposed to congestive heart failure. Will have patient follow with cardiology for consideration of echocardiogram.  I will ask  patient to call health Department since she was exposed to see about TB testing area patient and his primary care doctor.

## 2018-03-11 NOTE — DISCHARGE INSTRUCTIONS
No signs of tuberculosis. Call health Department to see if he can have a TB test done since she were exposed to TB. Important to get a primary care doctor. Call here to speak with our  during weekday hours at [phone]. Also keep appointment with cardiology when you're called to recheck on your heart. Rejection or for shortness of breath high fever or pain. Cough: Care Instructions  Your Care Instructions    A cough is your body's response to something that bothers your throat or airways. Many things can cause a cough. You might cough because of a cold or the flu, bronchitis, or asthma. Smoking, postnasal drip, allergies, and stomach acid that backs up into your throat also can cause coughs. A cough is a symptom, not a disease. Most coughs stop when the cause, such as a cold, goes away. You can take a few steps at home to cough less and feel better. Follow-up care is a key part of your treatment and safety. Be sure to make and go to all appointments, and call your doctor if you are having problems. It's also a good idea to know your test results and keep a list of the medicines you take. How can you care for yourself at home? · Drink lots of water and other fluids. This helps thin the mucus and soothes a dry or sore throat. Honey or lemon juice in hot water or tea may ease a dry cough. · Take cough medicine as directed by your doctor. · Prop up your head on pillows to help you breathe and ease a dry cough. · Try cough drops to soothe a dry or sore throat. Cough drops don't stop a cough. Medicine-flavored cough drops are no better than candy-flavored drops or hard candy. · Do not smoke. Avoid secondhand smoke. If you need help quitting, talk to your doctor about stop-smoking programs and medicines. These can increase your chances of quitting for good. When should you call for help? Call 911 anytime you think you may need emergency care.  For example, call if:  ? · You have severe trouble breathing. ?Call your doctor now or seek immediate medical care if:  ? · You cough up blood. ? · You have new or worse trouble breathing. ? · You have a new or higher fever. ? · You have a new rash. ? Watch closely for changes in your health, and be sure to contact your doctor if:  ? · You cough more deeply or more often, especially if you notice more mucus or a change in the color of your mucus. ? · You have new symptoms, such as a sore throat, an earache, or sinus pain. ? · You do not get better as expected. Where can you learn more? Go to http://matt-yu.info/. Enter D279 in the search box to learn more about \"Cough: Care Instructions. \"  Current as of: May 12, 2017  Content Version: 11.4  © 8963-7561 [employer]. Care instructions adapted under license by [employer] (which disclaims liability or warranty for this information). If you have questions about a medical condition or this instruction, always ask your healthcare professional. [address] any warranty or liability for your use of this information.

## 2018-03-11 NOTE — ED NOTES
PMD-None. Pt was seen here on 3/2/18 and was dx with bronchitis. She states that she has taken the antibiotic prescribed and continues to cough. She adds that her room mate was dx with TB last week.

## 2018-04-10 ENCOUNTER — HOSPITAL ENCOUNTER (INPATIENT)
Age: 33
LOS: 1 days | Discharge: LEFT BEFORE DISCHARGE | DRG: 303 | End: 2018-04-11
Attending: EMERGENCY MEDICINE | Admitting: FAMILY MEDICINE
Payer: SELF-PAY

## 2018-04-10 ENCOUNTER — APPOINTMENT (OUTPATIENT)
Dept: GENERAL RADIOLOGY | Age: 33
DRG: 303 | End: 2018-04-10
Attending: EMERGENCY MEDICINE
Payer: SELF-PAY

## 2018-04-10 DIAGNOSIS — R06.02 SOB (SHORTNESS OF BREATH): ICD-10-CM

## 2018-04-10 DIAGNOSIS — I51.7 CARDIOMEGALY: ICD-10-CM

## 2018-04-10 DIAGNOSIS — R09.02 HYPOXIA: Primary | ICD-10-CM

## 2018-04-10 PROBLEM — F11.10 HEROIN ABUSE (HCC): Status: ACTIVE | Noted: 2018-04-10

## 2018-04-10 PROBLEM — J45.901 ASTHMA EXACERBATION: Status: ACTIVE | Noted: 2018-04-10

## 2018-04-10 LAB
ALBUMIN SERPL-MCNC: 3 G/DL (ref 3.5–5)
ALBUMIN/GLOB SERPL: 0.8 {RATIO} (ref 1.2–3.5)
ALP SERPL-CCNC: 76 U/L (ref 50–136)
ALT SERPL-CCNC: 31 U/L (ref 12–65)
AMPHET UR QL SCN: POSITIVE
ANION GAP SERPL CALC-SCNC: 8 MMOL/L (ref 7–16)
AST SERPL-CCNC: 27 U/L (ref 15–37)
ATRIAL RATE: 98 BPM
BACTERIA URNS QL MICRO: ABNORMAL /HPF
BARBITURATES UR QL SCN: POSITIVE
BASOPHILS # BLD: 0 K/UL (ref 0–0.2)
BASOPHILS NFR BLD: 1 % (ref 0–2)
BENZODIAZ UR QL: POSITIVE
BILIRUB SERPL-MCNC: 0.7 MG/DL (ref 0.2–1.1)
BNP SERPL-MCNC: 1219 PG/ML
BUN SERPL-MCNC: 14 MG/DL (ref 6–23)
CALCIUM SERPL-MCNC: 8.5 MG/DL (ref 8.3–10.4)
CALCULATED P AXIS, ECG09: 53 DEGREES
CALCULATED R AXIS, ECG10: 89 DEGREES
CALCULATED T AXIS, ECG11: 78 DEGREES
CANNABINOIDS UR QL SCN: NEGATIVE
CASTS URNS QL MICRO: 0 /LPF
CHLORIDE SERPL-SCNC: 103 MMOL/L (ref 98–107)
CO2 SERPL-SCNC: 30 MMOL/L (ref 21–32)
COCAINE UR QL SCN: NEGATIVE
CREAT SERPL-MCNC: 1 MG/DL (ref 0.6–1)
CRYSTALS URNS QL MICRO: 0 /LPF
DIAGNOSIS, 93000: NORMAL
DIFFERENTIAL METHOD BLD: ABNORMAL
EOSINOPHIL # BLD: 0.1 K/UL (ref 0–0.8)
EOSINOPHIL NFR BLD: 2 % (ref 0.5–7.8)
EPI CELLS #/AREA URNS HPF: ABNORMAL /HPF
ERYTHROCYTE [DISTWIDTH] IN BLOOD BY AUTOMATED COUNT: 15.4 % (ref 11.9–14.6)
GLOBULIN SER CALC-MCNC: 3.9 G/DL (ref 2.3–3.5)
GLUCOSE SERPL-MCNC: 122 MG/DL (ref 65–100)
HCG UR QL: NEGATIVE
HCT VFR BLD AUTO: 36.1 % (ref 35.8–46.3)
HGB BLD-MCNC: 11.2 G/DL (ref 11.7–15.4)
IMM GRANULOCYTES # BLD: 0 K/UL (ref 0–0.5)
IMM GRANULOCYTES NFR BLD AUTO: 0 % (ref 0–5)
LYMPHOCYTES # BLD: 2 K/UL (ref 0.5–4.6)
LYMPHOCYTES NFR BLD: 29 % (ref 13–44)
MCH RBC QN AUTO: 25.1 PG (ref 26.1–32.9)
MCHC RBC AUTO-ENTMCNC: 31 G/DL (ref 31.4–35)
MCV RBC AUTO: 80.8 FL (ref 79.6–97.8)
METHADONE UR QL: NEGATIVE
MONOCYTES # BLD: 0.7 K/UL (ref 0.1–1.3)
MONOCYTES NFR BLD: 10 % (ref 4–12)
MUCOUS THREADS URNS QL MICRO: 0 /LPF
NEUTS SEG # BLD: 4 K/UL (ref 1.7–8.2)
NEUTS SEG NFR BLD: 58 % (ref 43–78)
OPIATES UR QL: POSITIVE
OTHER OBSERVATIONS,UCOM: ABNORMAL
P-R INTERVAL, ECG05: 166 MS
PCP UR QL: NEGATIVE
PLATELET # BLD AUTO: 272 K/UL (ref 150–450)
PMV BLD AUTO: 9.8 FL (ref 10.8–14.1)
POTASSIUM SERPL-SCNC: 3.9 MMOL/L (ref 3.5–5.1)
PROT SERPL-MCNC: 6.9 G/DL (ref 6.3–8.2)
Q-T INTERVAL, ECG07: 388 MS
QRS DURATION, ECG06: 102 MS
QTC CALCULATION (BEZET), ECG08: 495 MS
RBC # BLD AUTO: 4.47 M/UL (ref 4.05–5.25)
RBC #/AREA URNS HPF: ABNORMAL /HPF
SODIUM SERPL-SCNC: 141 MMOL/L (ref 136–145)
TROPONIN I BLD-MCNC: 0.02 NG/ML (ref 0.02–0.05)
TROPONIN I SERPL-MCNC: <0.04 NG/ML (ref 0.02–0.05)
VENTRICULAR RATE, ECG03: 98 BPM
WBC # BLD AUTO: 6.8 K/UL (ref 4.3–11.1)
WBC URNS QL MICRO: ABNORMAL /HPF

## 2018-04-10 PROCEDURE — 94664 DEMO&/EVAL PT USE INHALER: CPT

## 2018-04-10 PROCEDURE — 84484 ASSAY OF TROPONIN QUANT: CPT

## 2018-04-10 PROCEDURE — 96374 THER/PROPH/DIAG INJ IV PUSH: CPT | Performed by: EMERGENCY MEDICINE

## 2018-04-10 PROCEDURE — 74011250637 HC RX REV CODE- 250/637: Performed by: FAMILY MEDICINE

## 2018-04-10 PROCEDURE — 74011250636 HC RX REV CODE- 250/636: Performed by: EMERGENCY MEDICINE

## 2018-04-10 PROCEDURE — 85025 COMPLETE CBC W/AUTO DIFF WBC: CPT | Performed by: EMERGENCY MEDICINE

## 2018-04-10 PROCEDURE — 77010033678 HC OXYGEN DAILY

## 2018-04-10 PROCEDURE — 94640 AIRWAY INHALATION TREATMENT: CPT

## 2018-04-10 PROCEDURE — 71046 X-RAY EXAM CHEST 2 VIEWS: CPT

## 2018-04-10 PROCEDURE — 74011000250 HC RX REV CODE- 250: Performed by: EMERGENCY MEDICINE

## 2018-04-10 PROCEDURE — 94760 N-INVAS EAR/PLS OXIMETRY 1: CPT

## 2018-04-10 PROCEDURE — 74011250636 HC RX REV CODE- 250/636: Performed by: FAMILY MEDICINE

## 2018-04-10 PROCEDURE — 99218 HC RM OBSERVATION: CPT

## 2018-04-10 PROCEDURE — 80053 COMPREHEN METABOLIC PANEL: CPT | Performed by: EMERGENCY MEDICINE

## 2018-04-10 PROCEDURE — 80307 DRUG TEST PRSMV CHEM ANLYZR: CPT | Performed by: EMERGENCY MEDICINE

## 2018-04-10 PROCEDURE — 81025 URINE PREGNANCY TEST: CPT

## 2018-04-10 PROCEDURE — 81015 MICROSCOPIC EXAM OF URINE: CPT | Performed by: EMERGENCY MEDICINE

## 2018-04-10 PROCEDURE — 83880 ASSAY OF NATRIURETIC PEPTIDE: CPT | Performed by: EMERGENCY MEDICINE

## 2018-04-10 PROCEDURE — 93005 ELECTROCARDIOGRAM TRACING: CPT | Performed by: FAMILY MEDICINE

## 2018-04-10 PROCEDURE — 36415 COLL VENOUS BLD VENIPUNCTURE: CPT | Performed by: FAMILY MEDICINE

## 2018-04-10 PROCEDURE — 93005 ELECTROCARDIOGRAM TRACING: CPT | Performed by: EMERGENCY MEDICINE

## 2018-04-10 PROCEDURE — 99285 EMERGENCY DEPT VISIT HI MDM: CPT | Performed by: EMERGENCY MEDICINE

## 2018-04-10 PROCEDURE — 65610000001 HC ROOM ICU GENERAL

## 2018-04-10 RX ORDER — DIPHENHYDRAMINE HCL 25 MG
25 CAPSULE ORAL ONCE
Status: COMPLETED | OUTPATIENT
Start: 2018-04-11 | End: 2018-04-10

## 2018-04-10 RX ORDER — BUPRENORPHINE 2 MG/1
8 TABLET SUBLINGUAL DAILY
Status: DISCONTINUED | OUTPATIENT
Start: 2018-04-10 | End: 2018-04-10

## 2018-04-10 RX ORDER — FUROSEMIDE 10 MG/ML
40 INJECTION INTRAMUSCULAR; INTRAVENOUS
Status: COMPLETED | OUTPATIENT
Start: 2018-04-10 | End: 2018-04-10

## 2018-04-10 RX ORDER — ENOXAPARIN SODIUM 100 MG/ML
40 INJECTION SUBCUTANEOUS EVERY 24 HOURS
Status: DISCONTINUED | OUTPATIENT
Start: 2018-04-10 | End: 2018-04-11 | Stop reason: HOSPADM

## 2018-04-10 RX ORDER — MORPHINE SULFATE 2 MG/ML
0.5 INJECTION, SOLUTION INTRAMUSCULAR; INTRAVENOUS
Status: DISCONTINUED | OUTPATIENT
Start: 2018-04-10 | End: 2018-04-11

## 2018-04-10 RX ORDER — LORAZEPAM 2 MG/ML
1 INJECTION INTRAMUSCULAR
Status: DISCONTINUED | OUTPATIENT
Start: 2018-04-10 | End: 2018-04-11

## 2018-04-10 RX ORDER — SODIUM CHLORIDE 0.9 % (FLUSH) 0.9 %
5-10 SYRINGE (ML) INJECTION AS NEEDED
Status: DISCONTINUED | OUTPATIENT
Start: 2018-04-10 | End: 2018-04-11 | Stop reason: HOSPADM

## 2018-04-10 RX ORDER — LORAZEPAM 1 MG/1
3-4 TABLET ORAL
Status: DISCONTINUED | OUTPATIENT
Start: 2018-04-10 | End: 2018-04-10

## 2018-04-10 RX ORDER — ALBUTEROL SULFATE 90 UG/1
2 AEROSOL, METERED RESPIRATORY (INHALATION)
Status: DISCONTINUED | OUTPATIENT
Start: 2018-04-10 | End: 2018-04-11 | Stop reason: HOSPADM

## 2018-04-10 RX ORDER — SODIUM CHLORIDE 0.9 % (FLUSH) 0.9 %
5-10 SYRINGE (ML) INJECTION EVERY 8 HOURS
Status: DISCONTINUED | OUTPATIENT
Start: 2018-04-10 | End: 2018-04-11 | Stop reason: HOSPADM

## 2018-04-10 RX ORDER — LORAZEPAM 2 MG/ML
2 INJECTION INTRAMUSCULAR ONCE
Status: COMPLETED | OUTPATIENT
Start: 2018-04-11 | End: 2018-04-10

## 2018-04-10 RX ORDER — ACETAMINOPHEN 325 MG/1
650 TABLET ORAL
Status: DISCONTINUED | OUTPATIENT
Start: 2018-04-10 | End: 2018-04-11 | Stop reason: HOSPADM

## 2018-04-10 RX ORDER — CLONIDINE HYDROCHLORIDE 0.1 MG/1
0.1 TABLET ORAL
Status: DISCONTINUED | OUTPATIENT
Start: 2018-04-10 | End: 2018-04-11

## 2018-04-10 RX ORDER — LORAZEPAM 1 MG/1
1-2 TABLET ORAL
Status: DISCONTINUED | OUTPATIENT
Start: 2018-04-10 | End: 2018-04-10

## 2018-04-10 RX ORDER — ONDANSETRON 2 MG/ML
4 INJECTION INTRAMUSCULAR; INTRAVENOUS
Status: DISCONTINUED | OUTPATIENT
Start: 2018-04-10 | End: 2018-04-11 | Stop reason: HOSPADM

## 2018-04-10 RX ORDER — LORAZEPAM 1 MG/1
1-2 TABLET ORAL
Status: DISCONTINUED | OUTPATIENT
Start: 2018-04-10 | End: 2018-04-11

## 2018-04-10 RX ORDER — IPRATROPIUM BROMIDE AND ALBUTEROL SULFATE 2.5; .5 MG/3ML; MG/3ML
3 SOLUTION RESPIRATORY (INHALATION)
Status: COMPLETED | OUTPATIENT
Start: 2018-04-10 | End: 2018-04-10

## 2018-04-10 RX ADMIN — ENOXAPARIN SODIUM 40 MG: 40 INJECTION SUBCUTANEOUS at 18:24

## 2018-04-10 RX ADMIN — IPRATROPIUM BROMIDE AND ALBUTEROL SULFATE 3 ML: 2.5; .5 SOLUTION RESPIRATORY (INHALATION) at 13:40

## 2018-04-10 RX ADMIN — LORAZEPAM 1 MG: 1 TABLET ORAL at 20:26

## 2018-04-10 RX ADMIN — METHYLPREDNISOLONE SODIUM SUCCINATE 125 MG: 125 INJECTION, POWDER, FOR SOLUTION INTRAMUSCULAR; INTRAVENOUS at 14:24

## 2018-04-10 RX ADMIN — LORAZEPAM 1 MG: 2 INJECTION, SOLUTION INTRAMUSCULAR; INTRAVENOUS at 20:47

## 2018-04-10 RX ADMIN — Medication 10 ML: at 18:24

## 2018-04-10 RX ADMIN — LORAZEPAM 1 MG: 1 TABLET ORAL at 18:32

## 2018-04-10 RX ADMIN — FUROSEMIDE 40 MG: 10 INJECTION, SOLUTION INTRAMUSCULAR; INTRAVENOUS at 16:02

## 2018-04-10 RX ADMIN — BUPRENORPHINE HYDROCHLORIDE SUBLINGUAL 8 MG: 2 TABLET SUBLINGUAL at 17:00

## 2018-04-10 RX ADMIN — DIPHENHYDRAMINE HYDROCHLORIDE 25 MG: 25 CAPSULE ORAL at 23:30

## 2018-04-10 RX ADMIN — LORAZEPAM 2 MG: 2 INJECTION, SOLUTION INTRAMUSCULAR; INTRAVENOUS at 23:30

## 2018-04-10 RX ADMIN — Medication 10 ML: at 22:00

## 2018-04-10 NOTE — ED NOTES
Patient has multiple recent and old injection sites on her arms, and neck. Patient admits to being a Heroin user and has injected herself as recently as last night.

## 2018-04-10 NOTE — Clinical Note
Patient Class[de-identified] Observation [323] Type of Bed: Medical [8] Reason for Observation: Further evaluation Admitting Diagnosis: Asthma exacerbation [4518326] Admitting Diagnosis: Cardiomegaly [429. 3. ICD-9-CM] Admitting Physician: Jovon Shea Attending Physician: Jovon Shea

## 2018-04-10 NOTE — PROGRESS NOTES
Patient arrived to room 3303 via wheelchair. Complete CHG bath given, dual skin assessment completed with Erica Faith RN. Scabs and track marks noted to upper and lower extremities. Multiple tattoos. Patient ambulating in room with assist x 1, steady on her feet. Patient is tachypneic, placed on 3 L nasal cannula due to Sp02 dropping when patient falls asleep. Alert & oriented x 4 when awake. Patient's demeanor varies from drowsy to extremely restless. Will continue to monitor  , ST on monitor.

## 2018-04-10 NOTE — H&P
HOSPITALIST H&P/CONSULT  NAME:  Kel Tubbs   Age:  34 y.o.  :   1985   MRN:   [id number]  PCP: None  Consulting MD:  Treatment Team: Attending Provider: Claude Baron, MD  HPI:   Patient is a 29yo F with h/o drug abuse who presents from the [facility] for shortness of breath. She checked herself into the [facility] this morning, as she reports that she \"shot up\" heroin yesterday. She tells me that she feels \"completely awful\" and \"like $#!&.\"  She reports that she has withdrawn from heroin multiple times. She is difficult to get a history from as she is tearful at times and agitated. She does reports some SOB, but her general malaise and pain is outweighing that right now. On ER evaluation for her SOB, CXR showed \"gross cardiomegaly. \"  This prompted the ER MD to check a BNP. This was elevated at 1219. Cardiology was consulted by ER and recommended admission with echocardiogram.    Given her current condition, patient will need ICU monitoring, at least overnight. She reports chronic constipation from heroin usage, current diffuse myalgias, subjective fevers/chills/nausea. Complete ROS done and is as stated in HPI or otherwise negative  Past Medical History:   Diagnosis Date    Anxiety     Hypertension     Methamphetamine abuse     Neurological disorder     migraines     Polysubstance abuse       Past Surgical History:   Procedure Laterality Date    HX APPENDECTOMY      HX GYN            Prior to Admission Medications   Prescriptions Last Dose Informant Patient Reported? Taking? albuterol (PROVENTIL HFA, VENTOLIN HFA, PROAIR HFA) 90 mcg/actuation inhaler   No No   Sig: Take 2 Puffs by inhalation every six (6) hours as needed for Wheezing. cephALEXin (KEFLEX) 500 mg capsule   No No   Sig: Take 1 Cap by mouth three (3) times daily. predniSONE (STERAPRED DS) 10 mg dose pack   No No   Sig: Please take as directed on package.  Please clarify any questions with the Pharmacist.      Facility-Administered Medications: None     Allergies   Allergen Reactions    Latex Unknown (comments)    Sulfa (Sulfonamide Antibiotics) Rash      Social History   Substance Use Topics    Smoking status: Current Every Day Smoker     Packs/day: 1.00    Smokeless tobacco: Never Used    Alcohol use Yes      Comment: rarely   Admits to IV heroin use yesterday. History reviewed. No pertinent family history. Objective:     Visit Vitals    /83 (BP 1 Location: Left arm)    Pulse (!) 102    Temp 98.4 °F (36.9 °C)    Resp 29    Ht 6' (1.829 m)    Wt 95 kg (209 lb 6.4 oz)    SpO2 97%    BMI 28.4 kg/m2      Temp (24hrs), Av.2 °F (36.8 °C), Min:98 °F (36.7 °C), Max:98.4 °F (36.9 °C)    Oxygen Therapy  O2 Sat (%): 97 % (04/10/18 1744)  Pulse via Oximetry: 96 beats per minute (04/10/18 1657)  O2 Device: (P) Nasal cannula (04/10/18 1744)  O2 Flow Rate (L/min): (P) 3 l/min (04/10/18 1744)  Physical Exam:  General:    Appears uncomfortable     Head:   Normocephalic, without obvious abnormality, atraumatic. Nose:  Nares normal. No drainage or sinus tenderness. Lungs:   Clear to auscultation bilaterally. No Wheezing or Rhonchi. No rales. Heart:   Borderline tachycardic  Abdomen:   Soft, non-tender. Not distended. Bowel sounds normal.   Extremities: No edema. No clubbing, moves all spontaneously  Skin:     Too numerous to count tract marks on bilateral arms and legs  Neurologic: Agitated, but awake and alert  Data Review:   Recent Results (from the past 24 hour(s))   EKG, 12 LEAD, INITIAL    Collection Time: 04/10/18  1:26 PM   Result Value Ref Range    Ventricular Rate 98 BPM    Atrial Rate 98 BPM    P-R Interval 166 ms    QRS Duration 102 ms    Q-T Interval 388 ms    QTC Calculation (Bezet) 495 ms    Calculated P Axis 53 degrees    Calculated R Axis 89 degrees    Calculated T Axis 78 degrees    Diagnosis       !! AGE AND GENDER SPECIFIC ECG ANALYSIS !!   Normal sinus rhythm  Possible Left atrial enlargement  Left ventricular hypertrophy  Nonspecific ST and T wave abnormality  Prolonged QT  Abnormal ECG  No previous ECGs available  Confirmed by Blas Rosa MD (), SHARRI CAZARES ([id number]) on 4/10/2018 3:05:23 PM     CBC WITH AUTOMATED DIFF    Collection Time: 04/10/18  2:01 PM   Result Value Ref Range    WBC 6.8 4.3 - 11.1 K/uL    RBC 4.47 4.05 - 5.25 M/uL    HGB 11.2 (L) 11.7 - 15.4 g/dL    HCT 36.1 35.8 - 46.3 %    MCV 80.8 79.6 - 97.8 FL    MCH 25.1 (L) 26.1 - 32.9 PG    MCHC 31.0 (L) 31.4 - 35.0 g/dL    RDW 15.4 (H) 11.9 - 14.6 %    PLATELET 701 930 - 984 K/uL    MPV 9.8 (L) 10.8 - 14.1 FL    DF AUTOMATED      NEUTROPHILS 58 43 - 78 %    LYMPHOCYTES 29 13 - 44 %    MONOCYTES 10 4.0 - 12.0 %    EOSINOPHILS 2 0.5 - 7.8 %    BASOPHILS 1 0.0 - 2.0 %    IMMATURE GRANULOCYTES 0 0.0 - 5.0 %    ABS. NEUTROPHILS 4.0 1.7 - 8.2 K/UL    ABS. LYMPHOCYTES 2.0 0.5 - 4.6 K/UL    ABS. MONOCYTES 0.7 0.1 - 1.3 K/UL    ABS. EOSINOPHILS 0.1 0.0 - 0.8 K/UL    ABS. BASOPHILS 0.0 0.0 - 0.2 K/UL    ABS. IMM. GRANS. 0.0 0.0 - 0.5 K/UL   METABOLIC PANEL, COMPREHENSIVE    Collection Time: 04/10/18  2:01 PM   Result Value Ref Range    Sodium 141 136 - 145 mmol/L    Potassium 3.9 3.5 - 5.1 mmol/L    Chloride 103 98 - 107 mmol/L    CO2 30 21 - 32 mmol/L    Anion gap 8 7 - 16 mmol/L    Glucose 122 (H) 65 - 100 mg/dL    BUN 14 6 - 23 MG/DL    Creatinine 1.00 0.6 - 1.0 MG/DL    GFR est AA >60 >60 ml/min/1.73m2    GFR est non-AA >60 >60 ml/min/1.73m2    Calcium 8.5 8.3 - 10.4 MG/DL    Bilirubin, total 0.7 0.2 - 1.1 MG/DL    ALT (SGPT) 31 12 - 65 U/L    AST (SGOT) 27 15 - 37 U/L    Alk.  phosphatase 76 50 - 136 U/L    Protein, total 6.9 6.3 - 8.2 g/dL    Albumin 3.0 (L) 3.5 - 5.0 g/dL    Globulin 3.9 (H) 2.3 - 3.5 g/dL    A-G Ratio 0.8 (L) 1.2 - 3.5     BNP    Collection Time: 04/10/18  2:01 PM   Result Value Ref Range    BNP 1219 pg/mL   POC TROPONIN-I    Collection Time: 04/10/18  2:05 PM   Result Value Ref Range Troponin-I (POC) 0.02 0.02 - 0.05 ng/ml   HCG URINE, QL. - POC    Collection Time: 04/10/18  3:06 PM   Result Value Ref Range    Pregnancy test,urine (POC) NEGATIVE  NEG     URINE MICROSCOPIC    Collection Time: 04/10/18  3:14 PM   Result Value Ref Range    WBC 20-50 0 /hpf    RBC 5-10 0 /hpf    Epithelial cells 5-10 0 /hpf    Bacteria 1+ (H) 0 /hpf    Casts 0 0 /lpf    Crystals, urine 0 0 /LPF    Mucus 0 0 /lpf    Other observations RESULTS VERIFIED MANUALLY     DRUG SCREEN, URINE    Collection Time: 04/10/18  3:14 PM   Result Value Ref Range    PCP(PHENCYCLIDINE) NEGATIVE       BENZODIAZEPINES POSITIVE      COCAINE NEGATIVE       AMPHETAMINES POSITIVE      METHADONE NEGATIVE       THC (TH-CANNABINOL) NEGATIVE       OPIATES POSITIVE      BARBITURATES POSITIVE       Imaging /Procedures /Studies     Assessment and Plan:      Active Hospital Problems    Diagnosis Date Noted    Cardiomegaly 04/10/2018    Asthma exacerbation 04/10/2018    Heroin abuse 04/10/2018       PLAN  Cardiomegaly  - Will order echo  - Possibly CM from h/o drug use  - UDS from 2013 was positive for cocaine and today's UDS was positive for amphetamines  - Will monitor in ICU for cardiac monitoring  - Will trend troponins   - No peripheral edema, but was given 1 dose of Lasix by ER    Dyspnea  - Pt has h/o asthma  - Will start albuterol  - No wheezing on my exam  - Dyspnea likely 2/2 drug withdrawal and cardiomegaly  - Not needing supplemental oxygen at this time    Heroin abuse  - UDS also positive for amphetamines and barbiturates (as well as opiates and benzos, which may be iatrogenic)  - Will start clonidine and ativan for withdrawal symptoms  - Monitor in the ICU overnight  - Will plan to discharge the patient back to the [facility] for continue drug treatment once medically stable        Signed By: Nelly Callejas MD     April 10, 2018

## 2018-04-10 NOTE — PROGRESS NOTES
Report received. Chart reviewed. Patient dozing. Awakens to verbal. Vital signs acceptable. No complaints at present.

## 2018-04-10 NOTE — ED PROVIDER NOTES
HPI:  34 female history of asthma, seizure, hep C, drug abuse is currently at [facility] for heroin and benzo abuse. Checked in today. Started having shortness of breath and wheezing. O2 on room air was 87% on EMS arrival.  They placed on O2 which improved to 94%. Albuterol given during transport. Patient still feels short of breath. She admits to using heroin last night and not getting any sleep prior to checking in today. ROS  Constitutional: No fever, no chills  Skin: no rash  Eye: No vision changes  ENMT: No sore throat, no congestion  Respiratory: + shortness of breath  Cardiovascular: No chest pain  Gastrointestinal: No vomiting, no nausea, no diarrhea, no abdominal pain  : No dysuria, no hematuria  MSK: No back pain, no muscle pain  Neuro: No headache, no change in mental status, no numbness, no tingling, no weakness    All other review of systems positive per history of present illness and the above otherwise negative or noncontributory. Visit Vitals    /65    Pulse 99    Resp 20    Ht 6' (1.829 m)    Wt 95.3 kg (210 lb)    SpO2 92%  Comment: Pt put on 2LPM NC    BMI 28.48 kg/m2     Past Medical History:   Diagnosis Date    Anxiety     Hypertension     Methamphetamine abuse     Neurological disorder     migraines     Polysubstance abuse      Past Surgical History:   Procedure Laterality Date    HX APPENDECTOMY      HX GYN           Prior to Admission Medications   Prescriptions Last Dose Informant Patient Reported? Taking? albuterol (PROVENTIL HFA, VENTOLIN HFA, PROAIR HFA) 90 mcg/actuation inhaler   No No   Sig: Take 2 Puffs by inhalation every six (6) hours as needed for Wheezing. cephALEXin (KEFLEX) 500 mg capsule   No No   Sig: Take 1 Cap by mouth three (3) times daily. predniSONE (STERAPRED DS) 10 mg dose pack   No No   Sig: Please take as directed on package.  Please clarify any questions with the Pharmacist.      Facility-Administered Medications: None         Adult Exam   General: alert, no acute distress  Head: normocephalic, atraumatic  ENT: moist mucous membranes  Pupils Equally reactive and not pinpoint  Neck: supple, non-tender; full range of motion  Cardiovascular: regular rate and rhythm, normal peripheral perfusion, no edema  Respiratory: retractions noted. Diminished breath sounds right lower lobe lungs compared to the left. No wheezing, stridor  Gastrointestinal: soft, non-tender; no rebound or guarding, no peritoneal signs, no distension  Back: non-tender, full range of motion  Musculoskeletal: normal range of motion, normal strength, no gross deformities  Neurological: alert and oriented x 4, no gross focal deficits; normal speech  Psychiatric: cooperative; appropriate mood and affect    MDM: hypoxic on EMS arrival.  Improvement O2. She has finished one treatment. Will give a second DuoNeb. On 2 L nasal cannula. She is in the low 90s. She is a smoker. Diminished breath sounds on x-ray. Chest x-ray without consolidation but significant cardiomegaly noted. Bedside ultrasound without any obvious gross pericardial effusion. She is not hypotensive, tachycardic. I think this is less likely to be secondary to cardiac temponade. Solu-Medrol given. She did give herself heroin last night prior to checking in to the facility. She will likely need to be admitted for further observation. BNP is 1200. Chest cardiomegaly. Suspicious that she may have cardiomyopathy secondary to chronic drug use. Will need to be admitted given the overall picture. EKG obtained and interpreted by me revealed 89 sinus rhythm normal axis and prolonged QTC. Nonspecific ST changes. No STEMI. Nonspecific T-wave changes. Left ventricular hypertrophy noted    Spoke with Dr. Elan Sanchez from Cardiology. Would like the patient to be admitted to the Hospitalist service. Rec Echo and consult Card as needed. Spoke with hospitalist. Will admit to their service. Recent Results (from the past 12 hour(s))   EKG, 12 LEAD, INITIAL    Collection Time: 04/10/18  1:26 PM   Result Value Ref Range    Ventricular Rate 98 BPM    Atrial Rate 98 BPM    P-R Interval 166 ms    QRS Duration 102 ms    Q-T Interval 388 ms    QTC Calculation (Bezet) 495 ms    Calculated P Axis 53 degrees    Calculated R Axis 89 degrees    Calculated T Axis 78 degrees    Diagnosis       !! AGE AND GENDER SPECIFIC ECG ANALYSIS !! Normal sinus rhythm  Possible Left atrial enlargement  Left ventricular hypertrophy  Nonspecific ST and T wave abnormality  Prolonged QT  Abnormal ECG  No previous ECGs available  Confirmed by Warren Norton MD (), SHARRI CAZARES ([id number]) on 4/10/2018 3:05:23 PM     CBC WITH AUTOMATED DIFF    Collection Time: 04/10/18  2:01 PM   Result Value Ref Range    WBC 6.8 4.3 - 11.1 K/uL    RBC 4.47 4.05 - 5.25 M/uL    HGB 11.2 (L) 11.7 - 15.4 g/dL    HCT 36.1 35.8 - 46.3 %    MCV 80.8 79.6 - 97.8 FL    MCH 25.1 (L) 26.1 - 32.9 PG    MCHC 31.0 (L) 31.4 - 35.0 g/dL    RDW 15.4 (H) 11.9 - 14.6 %    PLATELET 356 398 - 212 K/uL    MPV 9.8 (L) 10.8 - 14.1 FL    DF AUTOMATED      NEUTROPHILS 58 43 - 78 %    LYMPHOCYTES 29 13 - 44 %    MONOCYTES 10 4.0 - 12.0 %    EOSINOPHILS 2 0.5 - 7.8 %    BASOPHILS 1 0.0 - 2.0 %    IMMATURE GRANULOCYTES 0 0.0 - 5.0 %    ABS. NEUTROPHILS 4.0 1.7 - 8.2 K/UL    ABS. LYMPHOCYTES 2.0 0.5 - 4.6 K/UL    ABS. MONOCYTES 0.7 0.1 - 1.3 K/UL    ABS. EOSINOPHILS 0.1 0.0 - 0.8 K/UL    ABS. BASOPHILS 0.0 0.0 - 0.2 K/UL    ABS. IMM.  GRANS. 0.0 0.0 - 0.5 K/UL   METABOLIC PANEL, COMPREHENSIVE    Collection Time: 04/10/18  2:01 PM   Result Value Ref Range    Sodium 141 136 - 145 mmol/L    Potassium 3.9 3.5 - 5.1 mmol/L    Chloride 103 98 - 107 mmol/L    CO2 30 21 - 32 mmol/L    Anion gap 8 7 - 16 mmol/L    Glucose 122 (H) 65 - 100 mg/dL    BUN 14 6 - 23 MG/DL    Creatinine 1.00 0.6 - 1.0 MG/DL    GFR est AA >60 >60 ml/min/1.73m2    GFR est non-AA >60 >60 ml/min/1.73m2    Calcium 8.5 8.3 - 10.4 MG/DL    Bilirubin, total 0.7 0.2 - 1.1 MG/DL    ALT (SGPT) 31 12 - 65 U/L    AST (SGOT) 27 15 - 37 U/L    Alk. phosphatase 76 50 - 136 U/L    Protein, total 6.9 6.3 - 8.2 g/dL    Albumin 3.0 (L) 3.5 - 5.0 g/dL    Globulin 3.9 (H) 2.3 - 3.5 g/dL    A-G Ratio 0.8 (L) 1.2 - 3.5     BNP    Collection Time: 04/10/18  2:01 PM   Result Value Ref Range    BNP 1219 pg/mL   POC TROPONIN-I    Collection Time: 04/10/18  2:05 PM   Result Value Ref Range    Troponin-I (POC) 0.02 0.02 - 0.05 ng/ml   HCG URINE, QL. - POC    Collection Time: 04/10/18  3:06 PM   Result Value Ref Range    Pregnancy test,urine (POC) NEGATIVE  NEG         Xr Chest Pa Lat    Result Date: 4/10/2018  PA AND LATERAL CHEST X-RAY  4/10/2018 HISTORY:  Shortness of breath. History of asthma. COMPARISON:  Chest x-rays 11/20/2018 FINDINGS:  PA and lateral views demonstrate gross, stable cardiomegaly. No pulmonary edema, focal infiltrate, or pleural effusion. IMPRESSION:  Gross cardiomegaly. No acute findings. Dragon voice recognition software was used to create this note. Although the note has been reviewed and corrected where necessary, additional errors may have been overlooked and remain in the text.

## 2018-04-10 NOTE — PROGRESS NOTES
TRANSFER - IN REPORT:    Verbal report received from [facility], [address] (name) on [employer]  being received from ER (unit) for routine progression of care      Report consisted of patients Situation, Background, Assessment and   Recommendations(SBAR). Information from the following report(s) SBAR, Kardex, ED Summary, Procedure Summary, Intake/Output, MAR, Recent Results and Med Rec Status was reviewed with the receiving nurse. Opportunity for questions and clarification was provided. Assessment completed upon patients arrival to unit and care assumed.

## 2018-04-10 NOTE — ED NOTES
TRANSFER - OUT REPORT:    Verbal report given to DION Grigsby on Rose Alford  being transferred to [facility] for routine progression of care       Report consisted of patients Situation, Background, Assessment and   Recommendations(SBAR). Information from the following report(s) ED Summary, MAR, Recent Results and Cardiac Rhythm NSR was reviewed with the receiving nurse. Lines:       Opportunity for questions and clarification was provided.       Patient transported with:   Monitor  Registered Nurse

## 2018-04-10 NOTE — ED TRIAGE NOTES
Pt was diagnosed with pneumonia about a month ago. Pt states the last few days shortness of breath has become worse. EMS gave 5 of albuterol. Pt has history of asthma. Pt is from [facility].     Caryle Knoll, RN

## 2018-04-11 ENCOUNTER — HOSPITAL ENCOUNTER (EMERGENCY)
Age: 33
Discharge: HOME | End: 2018-04-11
Attending: EMERGENCY MEDICINE
Payer: SELF-PAY

## 2018-04-11 VITALS
SYSTOLIC BLOOD PRESSURE: 131 MMHG | HEIGHT: 72 IN | HEART RATE: 128 BPM | WEIGHT: 209.4 LBS | BODY MASS INDEX: 28.36 KG/M2 | OXYGEN SATURATION: 94 % | RESPIRATION RATE: 24 BRPM | TEMPERATURE: 97.5 F | DIASTOLIC BLOOD PRESSURE: 96 MMHG

## 2018-04-11 LAB
ANION GAP SERPL CALC-SCNC: 10 MMOL/L (ref 7–16)
BASOPHILS # BLD: 0 K/UL (ref 0–0.2)
BASOPHILS NFR BLD: 0 % (ref 0–2)
BUN SERPL-MCNC: 16 MG/DL (ref 6–23)
CALCIUM SERPL-MCNC: 8.7 MG/DL (ref 8.3–10.4)
CHLORIDE SERPL-SCNC: 105 MMOL/L (ref 98–107)
CO2 SERPL-SCNC: 29 MMOL/L (ref 21–32)
CREAT SERPL-MCNC: 1 MG/DL (ref 0.6–1)
DIFFERENTIAL METHOD BLD: ABNORMAL
EOSINOPHIL # BLD: 0 K/UL (ref 0–0.8)
EOSINOPHIL NFR BLD: 0 % (ref 0.5–7.8)
ERYTHROCYTE [DISTWIDTH] IN BLOOD BY AUTOMATED COUNT: 15.3 % (ref 11.9–14.6)
GLUCOSE SERPL-MCNC: 111 MG/DL (ref 65–100)
HCT VFR BLD AUTO: 35.9 % (ref 35.8–46.3)
HGB BLD-MCNC: 11.3 G/DL (ref 11.7–15.4)
IMM GRANULOCYTES # BLD: 0 K/UL (ref 0–0.5)
IMM GRANULOCYTES NFR BLD AUTO: 0 % (ref 0–5)
LYMPHOCYTES # BLD: 1.2 K/UL (ref 0.5–4.6)
LYMPHOCYTES NFR BLD: 21 % (ref 13–44)
MCH RBC QN AUTO: 25.1 PG (ref 26.1–32.9)
MCHC RBC AUTO-ENTMCNC: 31.5 G/DL (ref 31.4–35)
MCV RBC AUTO: 79.6 FL (ref 79.6–97.8)
MONOCYTES # BLD: 0.4 K/UL (ref 0.1–1.3)
MONOCYTES NFR BLD: 6 % (ref 4–12)
NEUTS SEG # BLD: 4 K/UL (ref 1.7–8.2)
NEUTS SEG NFR BLD: 73 % (ref 43–78)
PLATELET # BLD AUTO: 281 K/UL (ref 150–450)
PMV BLD AUTO: 10 FL (ref 10.8–14.1)
POTASSIUM SERPL-SCNC: 3.8 MMOL/L (ref 3.5–5.1)
RBC # BLD AUTO: 4.51 M/UL (ref 4.05–5.25)
SODIUM SERPL-SCNC: 144 MMOL/L (ref 136–145)
TROPONIN I SERPL-MCNC: <0.04 NG/ML (ref 0.02–0.05)
WBC # BLD AUTO: 5.5 K/UL (ref 4.3–11.1)

## 2018-04-11 PROCEDURE — 74011250637 HC RX REV CODE- 250/637: Performed by: FAMILY MEDICINE

## 2018-04-11 PROCEDURE — 80048 BASIC METABOLIC PNL TOTAL CA: CPT | Performed by: FAMILY MEDICINE

## 2018-04-11 PROCEDURE — 36415 COLL VENOUS BLD VENIPUNCTURE: CPT | Performed by: FAMILY MEDICINE

## 2018-04-11 PROCEDURE — 75810000275 HC EMERGENCY DEPT VISIT NO LEVEL OF CARE: Performed by: EMERGENCY MEDICINE

## 2018-04-11 PROCEDURE — 84484 ASSAY OF TROPONIN QUANT: CPT | Performed by: FAMILY MEDICINE

## 2018-04-11 PROCEDURE — 74011250636 HC RX REV CODE- 250/636: Performed by: FAMILY MEDICINE

## 2018-04-11 PROCEDURE — 93306 TTE W/DOPPLER COMPLETE: CPT

## 2018-04-11 PROCEDURE — 85025 COMPLETE CBC W/AUTO DIFF WBC: CPT | Performed by: FAMILY MEDICINE

## 2018-04-11 RX ORDER — CLONIDINE HYDROCHLORIDE 0.1 MG/1
0.1 TABLET ORAL 3 TIMES DAILY
Status: DISCONTINUED | OUTPATIENT
Start: 2018-04-11 | End: 2018-04-11 | Stop reason: HOSPADM

## 2018-04-11 RX ORDER — PHENOBARBITAL 32.4 MG/1
15 TABLET ORAL 3 TIMES DAILY
Status: DISCONTINUED | OUTPATIENT
Start: 2018-04-11 | End: 2018-04-11 | Stop reason: HOSPADM

## 2018-04-11 RX ORDER — TRAMADOL HYDROCHLORIDE 50 MG/1
50 TABLET ORAL EVERY 6 HOURS
Status: DISCONTINUED | OUTPATIENT
Start: 2018-04-11 | End: 2018-04-11 | Stop reason: HOSPADM

## 2018-04-11 RX ADMIN — TRAMADOL HYDROCHLORIDE 50 MG: 50 TABLET, FILM COATED ORAL at 08:49

## 2018-04-11 RX ADMIN — LORAZEPAM 2 MG: 1 TABLET ORAL at 04:39

## 2018-04-11 RX ADMIN — PHENOBARBITAL 16.2 MG: 32.4 TABLET ORAL at 15:59

## 2018-04-11 RX ADMIN — CLONIDINE HYDROCHLORIDE 0.1 MG: 0.1 TABLET ORAL at 15:59

## 2018-04-11 RX ADMIN — LORAZEPAM 2 MG: 1 TABLET ORAL at 07:34

## 2018-04-11 RX ADMIN — TRAMADOL HYDROCHLORIDE 50 MG: 50 TABLET, FILM COATED ORAL at 15:59

## 2018-04-11 RX ADMIN — MORPHINE SULFATE 0.5 MG: 2 INJECTION, SOLUTION INTRAMUSCULAR; INTRAVENOUS at 00:49

## 2018-04-11 RX ADMIN — CLONIDINE HYDROCHLORIDE 0.1 MG: 0.1 TABLET ORAL at 08:01

## 2018-04-11 RX ADMIN — PHENOBARBITAL 16.2 MG: 32.4 TABLET ORAL at 08:49

## 2018-04-11 NOTE — PROGRESS NOTES
Patient out of bed, states she had to go to the bathroom. Reminded patient that she was asked to call for assistance to bathroom. Reminded patient that she is in the intensive care for shortness of breath and potential withdrawal, she states she is aware. She states at other facilities she is able to get up and walk around and take showers, I again reminded her she is a patient in the ICU and for her safety cannot get up and walk around, due to medication that have been administered and all the wires that can cause her to trip and fall. She is resistant to nursing advice.

## 2018-04-11 NOTE — PROGRESS NOTES
Report received from Camille Cheek, [facility]. Dual skin assessment reviewed. Pt sitting up in bed, awake, restless, moaning out at times. Oriented x4. Understands reason for coming to hospital but \"just wants to go home. \"  Picking and scratching skin periodically. Speech slightly slurred and pt walking with unsteady gait when up in room. ST on monitor, HR 100s, BP stable. Lung sounds clear. O2 Sat 96% on RA, RR 26.  Per pt \"my breathing doesn't feel like it did yesterday. \"  Appetite poor. Voiding to bathroom without difficulty. C/o generalized, aching pain and being medicated per MAR. Continuing to monitor closely.

## 2018-04-11 NOTE — PROGRESS NOTES
Continued agitation, wants to get up. Legs over side of bed. Stressed importance of remaining in the bed for her safety. Bed in low position, side rail up X 3. Bed alarm activated.

## 2018-04-11 NOTE — PROGRESS NOTES
Despite conversations regarding pt's medical treatment and illnesses, and much encouragement by the doctor, for staff and medical team to let us help her get well, pt adamantly wanting to leave the hospital against medical advice. Suyapa Perez, pt's sig other, also spoke with pt directly and attempted to encourage pt without success. AMA paperwork provided to pt and signed by the patient, this RN and Dr. Alejandro Klein, Hospitalist.  Pt left ICU with all belongings at this time.

## 2018-04-11 NOTE — PROGRESS NOTES
Awake, complaint of generalized discomfort all over. Assisted to commode, having intermittent loose stools.

## 2018-04-11 NOTE — DISCHARGE SUMMARY
Hospitalist Discharge Summary     Patient ID:  Manny Rose  [id number]  25 y.o.  1985  Admit date: 4/10/2018  1:02 PM  Discharge date and time: 4/11/2018  Attending: August Keita MD  PCP:  None  Treatment Team: Attending Provider: August Keita MD; Utilization Review: [place] RN; Care Manager: Patrizia Goyal    Principal Diagnosis Heroin abuse   Principal Problem:    Heroin abuse (4/10/2018)    Active Problems:    Cardiomegaly (4/10/2018)      Asthma exacerbation (4/10/2018)             Hospital Course:  Patient is a 31yo F with h/o drug abuse who presented on 4/10/18 from the [facility] for shortness of breath. She checked herself into the [facility] that morning, as she reported that she \"shot up\" heroin on the day prior. She reported that she felt \"completely awful\" and \"like $#!&.\"  She reported previous withdrawal from heroin multiple times. She was difficult to get a history from as she was tearful at times and agitated. She reported some SOB, but her general malaise and pain is outweighed that at time of admission.     On ER evaluation for her SOB, CXR showed \"gross cardiomegaly. \" This prompted the ER MD to check a BNP. This was elevated at 1219. Cardiology was consulted by ER and recommended admission with echocardiogram.     Patient was admitted to the ICU for close monitoring. She was extremely agitated at times, yelling about being \"confined\" in the ICU. She told nursing staff that she was \"going to leave\" on the morning of 4/11/18, but I was able to convince her to stay for further cardiac evaluation, including echocardiogram that had been ordered on admission. The [facility] faxed over their detoxification regimen, and this was initiated on the morning of 4/11/18 after discussion with the patient. Case Management contacted The [facility] to arrange for re-acceptance after patient was medically stable.     I was paged around 12:15 by patient's nurse to alert me that the patient was saying that she was leaving against medical advice. Nursing staff also let me know that the echocardiogram technician completed the procedure and by preliminary evaluation, her EF appeared to be around 5-10%. I immediately came to the ICU to attempt to convince the patient to stay and give her the preliminary results, with the prognosis that comes with an EF that low. I spoke with the patient for at least 30 minutes again on 4/11/18 to attempt to keep her hospitalized. I specifically and directly told her that she is at high risk for sudden cardiac death, especially given her ongoing illicit drug use. I told her that she would likely need cardiology intervention for pacemaker/defibrillator placement with her extremely low EF. I told her that once she was medically stable, that we were working on getting her back to [facility] for further detoxification. I explained that her ongoing drug use would kill her - she answered \"I don't believe you. \"  She told me that she \"knows that I'm going to die. I've already done this for 20 years. It doesn't scare me. \"  I asked her specifically if she was going to attempt to die/commit suicide, and she told me \"I'm not suicidal.  I'm not going to kill myself. All I am saying is that I'm not afraid to die in 2 days or 8 years. \"  The patient has children, and I implored her to stay to get treated for her drug use and severe cardiomyopathy, so that she could be here for her children. She answered that she did not want to her children to have a \"junkie for a mother,\" and I told her that this is her opportunity to get clean and help her heart, and she said \"I don't care. I'm going home. \"  Despite telling her directly that she was extremely high risk of dying if she left against medical advice and that I would not be discharging her from the hospital because she is not medically stable, the patient requested [place] paperwork and signed and left. I did call a STAT consult to Cardiology for their recommendations prior to the patient leaving the hospital.  The patient needs urgent follow-up with [address] Card[address] outpatient clinic for further evaluation for pacemaker/defibrillator. No recommendations for initiation of medications at this time. I will order outpatient follow-up appointment for the patient to attempt to make the patient's AMA discharge as safe as possible. I confirmed her home telephone number that is listed in her chart with the patient, and [facility] [address] Cardiology will call her with appointment information. I hope the patient listens to my advice and will stop using IV drugs and other illicit substances, and I sincerely hope the patient make her outpatient Cardiology appointment, but the patient has left the hospital against medical advice, despite my best efforts to keep her hospitalized. Significant Diagnostic Studies:       Labs: Results:       Chemistry Recent Labs      04/11/18   0129  04/10/18   1401   GLU  111*  122*   NA  144  141   K  3.8  3.9   CL  105  103   CO2  29  30   BUN  16  14   CREA  1.00  1.00   CA  8.7  8.5   AGAP  10  8   AP   --   76   TP   --   6.9   ALB   --   3.0*   GLOB   --   3.9*   AGRAT   --   0.8*      CBC w/Diff Recent Labs      04/11/18   0129  04/10/18   1401   WBC  5.5  6.8   RBC  4.51  4.47   HGB  11.3*  11.2*   HCT  35.9  36.1   PLT  281  272   GRANS  73  58   LYMPH  21  29   EOS  0*  2      Cardiac Enzymes No results for input(s): CPK, CKND1, SERGEY in the last 72 hours. No lab exists for component: CKRMB, TROIP   Coagulation No results for input(s): PTP, INR, APTT in the last 72 hours. No lab exists for component: INREXT    Lipid Panel No results found for: CHOL, CHOLPOCT, CHOLX, CHLST, CHOLV, 443937, HDL, LDL, LDLC, DLDLP, 551714, VLDLC, VLDL, TGLX, TRIGL, TRIGP, TGLPOCT, CHHD, CHHDX   BNP No results for input(s): BNPP in the last 72 hours.    Liver Enzymes Recent Labs 04/10/18   1401   TP  6.9   ALB  3.0*   AP  76   SGOT  27      Thyroid Studies No results found for: T4, T3U, TSH, TSHEXT       Echo Results  (Last 48 hours)               04/10/18 0000  2D ECHO COMPLETE ADULT (TTE) W OR WO CONTR Final result    Narrative: [address], [address]   [phone]       Transthoracic Echocardiogram   2D, M-mode, Doppler, and Color Doppler       Patient: Aleks Hammer   MR #: [id number]   : 1985   Age: 34 years   Gender: Female   Study date: 2018   Account #: [de-identified]   Height: 72 in   Weight: 208.6 lb   BSA: 2.17 mï¾²   Status:Routine   Location: [facility]   BP: 133/ 93       Allergies: LATEX, SULFA (SULFONAMIDE ANTIBIOTICS)       Sonographer:  Lynne Yee, RDCS   Group:  [facility] Cardiology   Referring Physician:  Truman Loaiza MD   Reading Physician:  Lonnie Meek. Compa Dial MD [facility]       INDICATIONS: Cardiomegaly. *Patient was uncooperative during the exam due to polysubstance abuse and   heroin use as of yesterday. Patient would not sit still. Definity was not   administered due to patients non compiance with recieving an IV. *       PROCEDURE: This was a routine study. A transthoracic echocardiogram was   performed. The study included complete 2D imaging, M-mode, complete spectral   Doppler, and color Doppler. Echocardiographic views were limited by poor   patient compliance. Image quality was adequate. LEFT VENTRICLE: Unable to rule out thrombus due to inability to access IV to   administer Definity. The ventricle was markedly dilated. The ventricle was   severely dilated. Systolic function was severely reduced. Ejection fraction    was   estimated in the range of 10 % to 15 %. There was severe diffuse hypokinesis. Wall thickness was normal. Avg. E/e'= 93.1 There is diastolic dysfunction. RIGHT VENTRICLE: The ventricle was dilated. Systolic function was reduced.    Estimated peak pressure was in the range of 35-40 mmHg. LEFT ATRIUM: The atrium was markedly dilated. RIGHT ATRIUM: The atrium was markedly dilated. SYSTEMIC VEINS: IVC: The inferior vena cava was dilated. The respirophasic   change in diameter was less than 50%. AORTIC VALVE: The valve was trileaflet. There was no evidence for stenosis. There was mild regurgitation. MITRAL VALVE: There was mild annular calcification. There was no evidence for   stenosis. There was moderate to severe regurgitation. TRICUSPID VALVE: The valve structure was normal. There was no evidence for   stenosis. There was mild to moderate regurgitation. PULMONIC VALVE: The valve structure was normal. There was no evidence for   stenosis. There was mild regurgitation. PERICARDIUM: A small pericardial effusion was identified circumferential to    the   heart. There was no evidence of hemodynamic compromise. AORTA: The root exhibited normal size. SUMMARY:       -  Left ventricle: Unable to rule out thrombus due to inability to access IV    to   administer Definity. The ventricle was markedly dilated. The ventricle was   severely dilated. Systolic function was severely reduced. Ejection fraction    was   estimated in the range of 10 % to 15 %. There was severe diffuse hypokinesis. Avg. E/e'= 49.3 There is diastolic dysfunction.       -  Right ventricle: The ventricle was dilated. Systolic function was reduced. -  Left atrium: The atrium was markedly dilated. -  Right atrium: The atrium was markedly dilated. -  Inferior vena cava, hepatic veins: The inferior vena cava was dilated. The   respirophasic change in diameter was less than 50%. -  Aortic valve: There was mild regurgitation.       -  Mitral valve: There was mild annular calcification. There was moderate to   severe regurgitation.       -  Tricuspid valve:  There was mild to moderate regurgitation.       -  Pericardium: A small pericardial effusion was identified circumferential    to   the heart. There was no evidence of hemodynamic compromise. SYSTEM MEASUREMENT TABLES       2D   LA Dimension (MM): 49 ml/m2       2D mode   AoR Diam (2D): 3.4 cm   LA Dimension (2D): 5 cm   IVS/LVPW (2D): 0.9   IVSd (2D): 1 cm   LVIDd (2D): 7.2 cm   LVIDs (2D): 6.2 cm   LVOT Area (2D): 3.1 cm2   LVPWd (2D): 1.1 cm   RVIDd (2D): 4.2 cm       Tissue Doppler Imaging   LV Peak Early Alexander Tissue Cezar; Lateral MA (TDI): 10.4 cm/s   LV Peak Early Alexander Tissue Cezar; Medial MA (TDI): 5.4 cm/s       Unspecified Scan Mode   Peak Grad; Mean; Antegrade Flow: 3 mm[Hg]   Vmax; Antegrade Flow: 76 cm/s   LVOT Diam: 2.2 cm   MV Peak Cezar/LV Peak Tissue Cezar E-Wave; Lateral MA: 12.6   MV Peak Cezar/LV Peak Tissue Cezar E-Wave; Medial MA: 24.4       Prepared and signed by       Yuki Freire MD [facility]   Signed 11-Apr-2018 13:48:02               Discharge Exam:  Visit Vitals    /87    Pulse (!) 108    Temp 97.8 °F (36.6 °C)    Resp 28    Ht 6' (1.829 m)    Wt 95 kg (209 lb 6.4 oz)    SpO2 97%    BMI 28.4 kg/m2     General:                     Appears uncomfortable. Lying in fetal position on bed   Lungs: Tachypneic. CTA Bilaterally. Heart:                        Tachycardic, regular rhythm.  No murmur ascultated  Abdomen:                  Soft, Non distended, Non tender, Positive bowel sounds  Extremities:               No edema. Track marks on BUE/BLE  Neurologic:                No focal deficits  Psychiatric:           Verbally denies SI/HI     Disposition: home  Discharge Condition: Unstable.   Left against medical advice  Current Discharge Medication List            Follow-up  · [address] Cardiology in 1 week    Time spent to discharge patient 35 minutes  Signed:  Leighann Fitzgerald MD  4/11/2018  1:00 PM

## 2018-04-11 NOTE — PROGRESS NOTES
Patient called [facility] with [facility] in ICU. Balbir Chua RN at [facility] requested patient's \"medical records. \" CM asked what she needed specifically. Balbir Chua stated \"Labs, H&P, and a physician's note stating that she's medically ready to return to detox. \" CM spoke with Dr. Jose Devi who advised that she would prepare the note once the tests ordered come back. Balbir Chua offered the patient an appointment tomorrow at 10 am. CM will confirm or re-schedule appointment based on further information.

## 2018-04-11 NOTE — PROGRESS NOTES
Patient \"changed her mind\" after speaking with the person who was picking her up from the hospital after she left AMA. She demanded a cigarette prior to coming back into the hospital.    Will continue management as outlined in my progress note from this morning. Will consult with Cardiology for help with inpatient management of cardiomyopathy with severely reduced ejection fraction.

## 2018-04-11 NOTE — PROGRESS NOTES
Care Management Interventions  Transition of Care Consult (CM Consult): Other  Discharge Durable Medical Equipment: No  Physical Therapy Consult: No  Occupational Therapy Consult: No  Plan discussed with Pt/Family/Caregiver: Yes  Freedom of Choice Offered: Yes  Discharge Location  Discharge Placement: Inpatient susbstance abuse program    CM called and spoke with Subhash Antoine RN charge nurse at [facility]. Subhash Antoine states that the patient's bed will be given up at 12 pm today per \"policy\" and the patient will need to call and do another admissions interview over the phone. If accepted, and a bed available, the patient can be transferred back to their facility. CM wrote down information and will discuss plan with Dr. Darvin Almodovar and the patient's nurse in ICU.

## 2018-04-11 NOTE — PROGRESS NOTES
Patient states she cannot lay in bed. Offered to get her in recliner if she will not attempt to get up alone, she, she agrees. Assisted her to recliner, with chair alarm in place.

## 2018-04-11 NOTE — PROGRESS NOTES
Staff alerted me that patient was wanting to leave AMA again. As I was occupied in the ER admitting 2 significantly ill patients, I was unable to go back to the ICU again to speak with the patient for another time (in addition to the 3 specific encounters with her today) before she demanded paperwork to leave. Earlier today, as documented in prior \"discharge\" summary after the patient left AMA the first time today, I explained in detail the need for her to stay hospitalized. She was encouraged to come back into the hospital by a friend from [facility], and she was placed back into her room, even after signing [employer] papers, per Nursing Supervisor policy. At the time of her second AMA leaving, Shadia Alvarado could not convince the patient to stay for additional treatment. Per patient report, she felt as though she was in severe pain, and we were not doing \"anything about it. \"  The patient was advised by nursing staff that we were following an appropriate detox protocol, and she could receive Tramadol (as ordered), but we were attempting to avoid additional opioids given detox recommendations. Unfortunately, patient has again left AGAINST MEDICAL ADVICE and refuses continued inpatient treatment for her severe cardiomyopathy. Again, we will arrange f/u with [facility] Cardiology in 1 week for outpatient follow-up. I hope she will at least follow up as directed.

## 2018-04-11 NOTE — PROGRESS NOTES
Pt at nurses desk stating \"I can't do this. I'm not going to sit in here crying in pain and no one do anything about it. I just can't take this. \"  Bhavesh Nguyen from [facility] unable to console patient and encourage her to stay for treatment from medical team.  Pt requesting to leave AMA AGAIN. Provided with AMA paperwork and the patient, this RN and Dr. Ritika Banerjee, Hospitalist all signed. Pt left ICU with all belongings at this time.

## 2018-04-11 NOTE — PROGRESS NOTES
Pt readmitted back to room 373 in ICU after leaving AMA per Yifan Muse, Nursing Supervisor. Pt was found again by security staff, sitting in a lounge chair across from the gift shop around [phone]. A rehab staff member, Joelle-ICU PCT and a 2nd  also proceeded to stay and inquire about pt's ride home, etc.  A card for [facility] was provided to the , and this group was contacted in order for pt to have a support person here with her and help transport her to a safe place. After long discussions with the staff and pt, pt agreed to stay in the hospital and continue treatment for her heart, and hopefully be able to get back to [facility] tomorrow to continue her detox once she was evaluated by a Cardologist.  Dr. Quentin Baum, Hospitalist made aware of pt's request to stay and readmission to room 373.

## 2018-04-11 NOTE — PROGRESS NOTES
Late entry due to hands on patient care: Very restless, pulls oxygen off and complains of shortness of breath, saturation = 96%. Emergency room nurse to bedside to attempt IV, X 2 sticks unsuccessful, patient states she cannot lay flat for attempt in neck, doctor aware.

## 2018-04-11 NOTE — PROGRESS NOTES
Paged and spoke with Dr. Salbador Lion, Hospitalist regarding pt's restlessness, detox and plan of care. Paperwork from [facility]PENDENC[facility] sent with patient describes detox plan per day and recc regimen of meds to give. MD on unit and reviewed medications. New orders placed by MD.  Continuing to monitor pt closely.

## 2018-04-11 NOTE — PROGRESS NOTES
Hospitalist Progress Note    2018  Admit Date: 4/10/2018  1:02 PM   NAME: Steven Hawley   :  1985   MRN:  [id number]   Attending: Rianna Murray MD  PCP:  None    SUBJECTIVE:   Patient was admitted yesterday from [facility] DEPENDENCY [facility] with dyspnea and found to have a grossly enlarged cardiac silhouette on CXR. She is going through heroin withdrawal.     - Patient feels awful and wants to [place] home. \"  She is frustrated because she does not want to be hooked up to monitors and feels confined. She continues to described myalgias, general malaise/discomfort. Long discussion with patient about safety of going home rather than staying for planned work-up for cardiomegaly. She has gone through heroin withdrawal before, and states that she understands it is painful and difficult. I re-iterated, as explained yesterday on admission, that she was admitted to initiate work-up for her enlarged heart, but once that is started, she can go back to [facility] DEPENDENCY [facility] for continued detoxification. Review of Systems negative with exception of pertinent positives noted above  PHYSICAL EXAM     Visit Vitals    BP (!) 133/93    Pulse (!) 119    Temp 97.8 °F (36.6 °C)    Resp (!) 44    Ht 6' (1.829 m)    Wt 95 kg (209 lb 6.4 oz)    SpO2 93%    BMI 28.4 kg/m2      Temp (24hrs), Av.5 °F (36.9 °C), Min:97.8 °F (36.6 °C), Max:99.6 °F (37.6 °C)    Oxygen Therapy  O2 Sat (%): 93 % (18)  Pulse via Oximetry: 124 beats per minute (18)  O2 Device: Room air (04/10/18 2026)  O2 Flow Rate (L/min): 3 l/min (04/10/18 2023)    Intake/Output Summary (Last 24 hours) at 18 0816  Last data filed at 04/10/18 1843   Gross per 24 hour   Intake              150 ml   Output                0 ml   Net              150 ml      General: Appears uncomfortable. Sitting in chair, hunched over with head in between knees.   Lungs: Tachypneic. CTA Bilaterally.    Heart:  Tachycardic, regular rhythm.  No murmur ascultated  Abdomen: Soft, Non distended, Non tender, Positive bowel sounds  Extremities: No edema.   Track marks on BUE/BLE  Neurologic:  No focal deficits    ASSESSMENT      Active Hospital Problems    Diagnosis Date Noted    Cardiomegaly 04/10/2018    Asthma exacerbation 04/10/2018    Heroin abuse 04/10/2018     Plan:  Cardiomegaly  - Likely related to prior illicit drug use  - Echo ordered, pending completion  - Received 1 dose Lasix in ER yesterday, no s/sx of fluid overload/edema  - Troponins stable at <0.04 x 3    Dyspnea  - Resolved  - Does have h/o asthma  - Albuterol prn  - Lungs are clear without wheeze    Heroin Abuse/Withdrawal  - Received [facility] detox med schedule  - Will restart this regimen, including clonidine, phenobarb, and tramadol  - Plan to discharge back to [facility] after work-up for CM is initiated (specifically echo) and medically stable, so that she can continue detox in appropriate center        Signed By: Branden Christensen MD     April 11, 2018

## 2018-04-11 NOTE — PROGRESS NOTES
Quiet only briefly. Continues to toss and turn in the bed. Hollering out a interval, upon entering room found saline well pulling out in bed. Attempted to start new site, X 2 sticks unsuccessful. Patient restless.

## 2018-04-11 NOTE — INTERDISCIPLINARY ROUNDS
Interdisciplinary team rounds were held 4/11/2018 with the following team members:Care Management, Nursing, Physical Therapy and Physician and the patient and significant other. Plan of care discussed. See clinical pathway and/or care plan for interventions and desired outcomes.

## 2018-04-11 NOTE — PROGRESS NOTES
Pt alert and oriented x4. Moaning out in pain occasionally. ST,  on monitor. /96. Lung sounds clear. O2 Sat 94% on RA, RR 24. Abd soft, nontender. Bowel sounds present. Able to void up to bathroom. Skin dry and warm. Scattered scars, abrasions, bruising to BUE/BLE. Tattoos also present. No sacral breakdown. Pt turns self in bed. Pt medicated w/tramadol for pain per MAR, along with other evening meds. Juventnio Elias, member of [employer] at bedside to sit and visit with patient.

## 2018-04-11 NOTE — PROGRESS NOTES
ECHO tech completed test at bedside. Per tech, appears pt's EF is low. Will have to await full report from Cardiologist.  Paged and updated Dr. Alphonso Pan. Per MD, pt needs further work up and recc from Cardiologist regarding her heart, and that she is not medically ready to return to [facility] CHEM[facility] DEPENDENCY [facility]. Updated pt that she would not be returning to [facility] today. Pt states \"Well I'm not staying here another day. I want to go home. I don't care what happens but I'm leaving today. \"  Informed Dr. Alphonso Pan of pt statements. MD to come speak with pt. Also called Krystyna Augustin, pt's significant other (and father of her 21 mo old child) and updated on pt's adamancy of leaving the hospital.  Will have MD speak with Ritika Clinton and update.

## 2018-04-11 NOTE — PROGRESS NOTES
Patient awake, hollering out, complains of cramping in leg upon entering room. Sitting up in bed crying, assisted to bathroom, states nursing is invading her private space. Assured her we are only here to keep her safe. Once back to bed she begins crying out and kicking the bottom of the bed.

## 2018-04-13 LAB
CALCULATED P AXIS, ECG09: 53 DEGREES
CALCULATED R AXIS, ECG10: 89 DEGREES
CALCULATED T AXIS, ECG11: 78 DEGREES
DIAGNOSIS, 93000: NORMAL
P-R INTERVAL, ECG05: 166 MS
Q-T INTERVAL, ECG07: 388 MS
QTC CALCULATION (BEZET), ECG08: 496 MS
VENTRICULAR RATE, ECG03: 98 BPM

## 2018-08-30 ENCOUNTER — HOSPITAL ENCOUNTER (OUTPATIENT)
Age: 33
Setting detail: OBSERVATION
Discharge: LEFT BEFORE DISCHARGE | End: 2018-08-30
Attending: EMERGENCY MEDICINE | Admitting: FAMILY MEDICINE
Payer: SUBSIDIZED

## 2018-08-30 ENCOUNTER — APPOINTMENT (OUTPATIENT)
Dept: GENERAL RADIOLOGY | Age: 33
End: 2018-08-30
Attending: EMERGENCY MEDICINE
Payer: SUBSIDIZED

## 2018-08-30 VITALS
WEIGHT: 170 LBS | RESPIRATION RATE: 20 BRPM | HEART RATE: 96 BPM | SYSTOLIC BLOOD PRESSURE: 107 MMHG | HEIGHT: 72 IN | OXYGEN SATURATION: 96 % | TEMPERATURE: 99.7 F | DIASTOLIC BLOOD PRESSURE: 74 MMHG | BODY MASS INDEX: 23.03 KG/M2

## 2018-08-30 DIAGNOSIS — N30.00 ACUTE CYSTITIS WITHOUT HEMATURIA: ICD-10-CM

## 2018-08-30 DIAGNOSIS — R00.0 TACHYCARDIA: Primary | ICD-10-CM

## 2018-08-30 PROBLEM — N39.0 UTI (URINARY TRACT INFECTION): Status: ACTIVE | Noted: 2018-08-30

## 2018-08-30 PROBLEM — I42.0 DILATED CARDIOMYOPATHY (HCC): Status: ACTIVE | Noted: 2018-08-30

## 2018-08-30 PROBLEM — D72.829 LEUKOCYTOSIS: Status: ACTIVE | Noted: 2018-08-30

## 2018-08-30 PROBLEM — I50.22 SYSTOLIC CHF, CHRONIC (HCC): Status: ACTIVE | Noted: 2018-08-30

## 2018-08-30 PROBLEM — I51.7 CARDIOMEGALY: Status: RESOLVED | Noted: 2018-04-10 | Resolved: 2018-08-30

## 2018-08-30 PROBLEM — J45.901 ASTHMA EXACERBATION: Status: RESOLVED | Noted: 2018-04-10 | Resolved: 2018-08-30

## 2018-08-30 LAB
ALBUMIN SERPL-MCNC: 3 G/DL (ref 3.5–5)
ALBUMIN/GLOB SERPL: 0.7 {RATIO} (ref 1.2–3.5)
ALP SERPL-CCNC: 121 U/L (ref 50–136)
ALT SERPL-CCNC: 55 U/L (ref 12–65)
AMPHET UR QL SCN: POSITIVE
ANION GAP SERPL CALC-SCNC: 11 MMOL/L (ref 7–16)
ANION GAP SERPL CALC-SCNC: 12 MMOL/L (ref 7–16)
AST SERPL-CCNC: 46 U/L (ref 15–37)
ATRIAL RATE: 136 BPM
BACTERIA URNS QL MICRO: NORMAL /HPF
BARBITURATES UR QL SCN: NEGATIVE
BASOPHILS # BLD: 0 K/UL (ref 0–0.2)
BASOPHILS # BLD: 0 K/UL (ref 0–0.2)
BASOPHILS NFR BLD: 0 % (ref 0–2)
BASOPHILS NFR BLD: 0 % (ref 0–2)
BENZODIAZ UR QL: NEGATIVE
BILIRUB SERPL-MCNC: 0.7 MG/DL (ref 0.2–1.1)
BNP SERPL-MCNC: 840 PG/ML
BUN SERPL-MCNC: 15 MG/DL (ref 6–23)
BUN SERPL-MCNC: 16 MG/DL (ref 6–23)
CALCIUM SERPL-MCNC: 8.1 MG/DL (ref 8.3–10.4)
CALCIUM SERPL-MCNC: 8.5 MG/DL (ref 8.3–10.4)
CALCULATED P AXIS, ECG09: 36 DEGREES
CALCULATED R AXIS, ECG10: 47 DEGREES
CALCULATED T AXIS, ECG11: -88 DEGREES
CANNABINOIDS UR QL SCN: NEGATIVE
CASTS URNS QL MICRO: NORMAL /LPF
CHLORIDE SERPL-SCNC: 100 MMOL/L (ref 98–107)
CHLORIDE SERPL-SCNC: 101 MMOL/L (ref 98–107)
CO2 SERPL-SCNC: 23 MMOL/L (ref 21–32)
CO2 SERPL-SCNC: 25 MMOL/L (ref 21–32)
COCAINE UR QL SCN: NEGATIVE
CREAT SERPL-MCNC: 0.87 MG/DL (ref 0.6–1)
CREAT SERPL-MCNC: 0.88 MG/DL (ref 0.6–1)
CRYSTALS URNS QL MICRO: 0 /LPF
DIAGNOSIS, 93000: NORMAL
DIFFERENTIAL METHOD BLD: ABNORMAL
DIFFERENTIAL METHOD BLD: ABNORMAL
EOSINOPHIL # BLD: 0 K/UL (ref 0–0.8)
EOSINOPHIL # BLD: 0 K/UL (ref 0–0.8)
EOSINOPHIL NFR BLD: 0 % (ref 0.5–7.8)
EOSINOPHIL NFR BLD: 0 % (ref 0.5–7.8)
EPI CELLS #/AREA URNS HPF: NORMAL /HPF
ERYTHROCYTE [DISTWIDTH] IN BLOOD BY AUTOMATED COUNT: 17.1 %
ERYTHROCYTE [DISTWIDTH] IN BLOOD BY AUTOMATED COUNT: 17.3 %
GLOBULIN SER CALC-MCNC: 4.6 G/DL (ref 2.3–3.5)
GLUCOSE SERPL-MCNC: 108 MG/DL (ref 65–100)
GLUCOSE SERPL-MCNC: 113 MG/DL (ref 65–100)
HCG UR QL: NEGATIVE
HCT VFR BLD AUTO: 36.7 % (ref 35.8–46.3)
HCT VFR BLD AUTO: 37.5 % (ref 35.8–46.3)
HGB BLD-MCNC: 11.4 G/DL (ref 11.7–15.4)
HGB BLD-MCNC: 11.8 G/DL (ref 11.7–15.4)
IMM GRANULOCYTES # BLD: 0 K/UL (ref 0–0.5)
IMM GRANULOCYTES # BLD: 0.1 K/UL (ref 0–0.5)
IMM GRANULOCYTES NFR BLD AUTO: 0 % (ref 0–5)
IMM GRANULOCYTES NFR BLD AUTO: 1 % (ref 0–5)
LACTATE BLD-SCNC: 0.9 MMOL/L (ref 0.5–1.9)
LYMPHOCYTES # BLD: 1.3 K/UL (ref 0.5–4.6)
LYMPHOCYTES # BLD: 1.5 K/UL (ref 0.5–4.6)
LYMPHOCYTES NFR BLD: 11 % (ref 13–44)
LYMPHOCYTES NFR BLD: 13 % (ref 13–44)
MCH RBC QN AUTO: 26.3 PG (ref 26.1–32.9)
MCH RBC QN AUTO: 26.5 PG (ref 26.1–32.9)
MCHC RBC AUTO-ENTMCNC: 31.1 G/DL (ref 31.4–35)
MCHC RBC AUTO-ENTMCNC: 31.5 G/DL (ref 31.4–35)
MCV RBC AUTO: 84.3 FL (ref 79.6–97.8)
MCV RBC AUTO: 84.6 FL (ref 79.6–97.8)
METHADONE UR QL: POSITIVE
MONOCYTES # BLD: 0.8 K/UL (ref 0.1–1.3)
MONOCYTES # BLD: 0.9 K/UL (ref 0.1–1.3)
MONOCYTES NFR BLD: 6 % (ref 4–12)
MONOCYTES NFR BLD: 8 % (ref 4–12)
MUCOUS THREADS URNS QL MICRO: 0 /LPF
NEUTS SEG # BLD: 8.9 K/UL (ref 1.7–8.2)
NEUTS SEG # BLD: 9.6 K/UL (ref 1.7–8.2)
NEUTS SEG NFR BLD: 78 % (ref 43–78)
NEUTS SEG NFR BLD: 82 % (ref 43–78)
NRBC # BLD: 0 K/UL (ref 0–0.2)
NRBC # BLD: 0 K/UL (ref 0–0.2)
OPIATES UR QL: POSITIVE
OTHER OBSERVATIONS,UCOM: NORMAL
P-R INTERVAL, ECG05: 140 MS
PCP UR QL: NEGATIVE
PLATELET # BLD AUTO: 204 K/UL (ref 150–450)
PLATELET # BLD AUTO: 204 K/UL (ref 150–450)
PMV BLD AUTO: 9.6 FL (ref 9.4–12.3)
PMV BLD AUTO: 9.7 FL (ref 9.4–12.3)
POTASSIUM SERPL-SCNC: 3.9 MMOL/L (ref 3.5–5.1)
POTASSIUM SERPL-SCNC: 4.3 MMOL/L (ref 3.5–5.1)
PROT SERPL-MCNC: 7.6 G/DL (ref 6.3–8.2)
Q-T INTERVAL, ECG07: 290 MS
QRS DURATION, ECG06: 112 MS
QTC CALCULATION (BEZET), ECG08: 436 MS
RBC # BLD AUTO: 4.34 M/UL (ref 4.05–5.2)
RBC # BLD AUTO: 4.45 M/UL (ref 4.05–5.2)
RBC #/AREA URNS HPF: NORMAL /HPF
SODIUM SERPL-SCNC: 136 MMOL/L (ref 136–145)
SODIUM SERPL-SCNC: 136 MMOL/L (ref 136–145)
TROPONIN I SERPL-MCNC: <0.04 NG/ML (ref 0.02–0.05)
VENTRICULAR RATE, ECG03: 136 BPM
WBC # BLD AUTO: 11.4 K/UL (ref 4.3–11.1)
WBC # BLD AUTO: 11.7 K/UL (ref 4.3–11.1)
WBC URNS QL MICRO: NORMAL /HPF

## 2018-08-30 PROCEDURE — 87077 CULTURE AEROBIC IDENTIFY: CPT

## 2018-08-30 PROCEDURE — 36415 COLL VENOUS BLD VENIPUNCTURE: CPT

## 2018-08-30 PROCEDURE — 81015 MICROSCOPIC EXAM OF URINE: CPT

## 2018-08-30 PROCEDURE — 80307 DRUG TEST PRSMV CHEM ANLYZR: CPT

## 2018-08-30 PROCEDURE — 85025 COMPLETE CBC W/AUTO DIFF WBC: CPT

## 2018-08-30 PROCEDURE — 74011000258 HC RX REV CODE- 258: Performed by: EMERGENCY MEDICINE

## 2018-08-30 PROCEDURE — 81025 URINE PREGNANCY TEST: CPT

## 2018-08-30 PROCEDURE — 74011250636 HC RX REV CODE- 250/636: Performed by: EMERGENCY MEDICINE

## 2018-08-30 PROCEDURE — 87186 SC STD MICRODIL/AGAR DIL: CPT

## 2018-08-30 PROCEDURE — 84484 ASSAY OF TROPONIN QUANT: CPT

## 2018-08-30 PROCEDURE — 71045 X-RAY EXAM CHEST 1 VIEW: CPT

## 2018-08-30 PROCEDURE — 74011000250 HC RX REV CODE- 250: Performed by: EMERGENCY MEDICINE

## 2018-08-30 PROCEDURE — 99284 EMERGENCY DEPT VISIT MOD MDM: CPT | Performed by: EMERGENCY MEDICINE

## 2018-08-30 PROCEDURE — 87641 MR-STAPH DNA AMP PROBE: CPT

## 2018-08-30 PROCEDURE — 93005 ELECTROCARDIOGRAM TRACING: CPT | Performed by: EMERGENCY MEDICINE

## 2018-08-30 PROCEDURE — 74011250636 HC RX REV CODE- 250/636: Performed by: FAMILY MEDICINE

## 2018-08-30 PROCEDURE — 83880 ASSAY OF NATRIURETIC PEPTIDE: CPT

## 2018-08-30 PROCEDURE — 96365 THER/PROPH/DIAG IV INF INIT: CPT | Performed by: EMERGENCY MEDICINE

## 2018-08-30 PROCEDURE — 99218 HC RM OBSERVATION: CPT

## 2018-08-30 PROCEDURE — 96361 HYDRATE IV INFUSION ADD-ON: CPT | Performed by: EMERGENCY MEDICINE

## 2018-08-30 PROCEDURE — 80053 COMPREHEN METABOLIC PANEL: CPT

## 2018-08-30 PROCEDURE — 74011250637 HC RX REV CODE- 250/637: Performed by: FAMILY MEDICINE

## 2018-08-30 PROCEDURE — 87086 URINE CULTURE/COLONY COUNT: CPT

## 2018-08-30 PROCEDURE — 87040 BLOOD CULTURE FOR BACTERIA: CPT

## 2018-08-30 PROCEDURE — 83605 ASSAY OF LACTIC ACID: CPT

## 2018-08-30 PROCEDURE — 81003 URINALYSIS AUTO W/O SCOPE: CPT | Performed by: EMERGENCY MEDICINE

## 2018-08-30 PROCEDURE — 96376 TX/PRO/DX INJ SAME DRUG ADON: CPT | Performed by: EMERGENCY MEDICINE

## 2018-08-30 PROCEDURE — 87205 SMEAR GRAM STAIN: CPT

## 2018-08-30 PROCEDURE — 96375 TX/PRO/DX INJ NEW DRUG ADDON: CPT | Performed by: EMERGENCY MEDICINE

## 2018-08-30 RX ORDER — ACETAMINOPHEN 325 MG/1
650 TABLET ORAL
Status: DISCONTINUED | OUTPATIENT
Start: 2018-08-30 | End: 2018-08-30 | Stop reason: SDUPTHER

## 2018-08-30 RX ORDER — METOPROLOL SUCCINATE 100 MG/1
100 TABLET, EXTENDED RELEASE ORAL DAILY
Status: DISCONTINUED | OUTPATIENT
Start: 2018-08-30 | End: 2018-08-30 | Stop reason: HOSPADM

## 2018-08-30 RX ORDER — ONDANSETRON 2 MG/ML
4 INJECTION INTRAMUSCULAR; INTRAVENOUS
Status: DISCONTINUED | OUTPATIENT
Start: 2018-08-30 | End: 2018-08-30 | Stop reason: HOSPADM

## 2018-08-30 RX ORDER — METOPROLOL TARTRATE 5 MG/5ML
5 INJECTION INTRAVENOUS ONCE
Status: COMPLETED | OUTPATIENT
Start: 2018-08-30 | End: 2018-08-30

## 2018-08-30 RX ORDER — TRIPROLIDINE/PSEUDOEPHEDRINE 2.5MG-60MG
600 TABLET ORAL
Status: DISCONTINUED | OUTPATIENT
Start: 2018-08-30 | End: 2018-08-30 | Stop reason: HOSPADM

## 2018-08-30 RX ORDER — ADHESIVE BANDAGE
30 BANDAGE TOPICAL DAILY PRN
Status: DISCONTINUED | OUTPATIENT
Start: 2018-08-30 | End: 2018-08-30 | Stop reason: HOSPADM

## 2018-08-30 RX ORDER — ACETAMINOPHEN 325 MG/1
650 TABLET ORAL
Status: DISCONTINUED | OUTPATIENT
Start: 2018-08-30 | End: 2018-08-30 | Stop reason: HOSPADM

## 2018-08-30 RX ORDER — SPIRONOLACTONE 25 MG/1
12.5 TABLET ORAL DAILY
Status: DISCONTINUED | OUTPATIENT
Start: 2018-08-30 | End: 2018-08-30 | Stop reason: HOSPADM

## 2018-08-30 RX ORDER — SODIUM CHLORIDE 9 MG/ML
100 INJECTION, SOLUTION INTRAVENOUS CONTINUOUS
Status: DISCONTINUED | OUTPATIENT
Start: 2018-08-30 | End: 2018-08-30 | Stop reason: HOSPADM

## 2018-08-30 RX ORDER — SODIUM CHLORIDE 0.9 % (FLUSH) 0.9 %
5-10 SYRINGE (ML) INJECTION AS NEEDED
Status: DISCONTINUED | OUTPATIENT
Start: 2018-08-30 | End: 2018-08-30 | Stop reason: SDUPTHER

## 2018-08-30 RX ORDER — LISINOPRIL 5 MG/1
5 TABLET ORAL DAILY
Status: DISCONTINUED | OUTPATIENT
Start: 2018-08-30 | End: 2018-08-30 | Stop reason: HOSPADM

## 2018-08-30 RX ORDER — SODIUM CHLORIDE 0.9 % (FLUSH) 0.9 %
5-10 SYRINGE (ML) INJECTION EVERY 8 HOURS
Status: DISCONTINUED | OUTPATIENT
Start: 2018-08-30 | End: 2018-08-30 | Stop reason: HOSPADM

## 2018-08-30 RX ORDER — SODIUM CHLORIDE 0.9 % (FLUSH) 0.9 %
5-10 SYRINGE (ML) INJECTION EVERY 8 HOURS
Status: DISCONTINUED | OUTPATIENT
Start: 2018-08-30 | End: 2018-08-30 | Stop reason: SDUPTHER

## 2018-08-30 RX ORDER — SODIUM CHLORIDE 0.9 % (FLUSH) 0.9 %
5-10 SYRINGE (ML) INJECTION AS NEEDED
Status: DISCONTINUED | OUTPATIENT
Start: 2018-08-30 | End: 2018-08-30 | Stop reason: HOSPADM

## 2018-08-30 RX ORDER — ENOXAPARIN SODIUM 100 MG/ML
40 INJECTION SUBCUTANEOUS EVERY 24 HOURS
Status: DISCONTINUED | OUTPATIENT
Start: 2018-08-30 | End: 2018-08-30 | Stop reason: HOSPADM

## 2018-08-30 RX ADMIN — METOPROLOL TARTRATE 5 MG: 5 INJECTION, SOLUTION INTRAVENOUS at 01:54

## 2018-08-30 RX ADMIN — ACETAMINOPHEN 650 MG: 325 TABLET ORAL at 04:45

## 2018-08-30 RX ADMIN — METOPROLOL SUCCINATE 100 MG: 100 TABLET, EXTENDED RELEASE ORAL at 03:07

## 2018-08-30 RX ADMIN — SODIUM CHLORIDE 1000 ML: 900 INJECTION, SOLUTION INTRAVENOUS at 01:03

## 2018-08-30 RX ADMIN — METOPROLOL TARTRATE 5 MG: 5 INJECTION, SOLUTION INTRAVENOUS at 01:04

## 2018-08-30 RX ADMIN — CEFTRIAXONE 1 G: 1 INJECTION, POWDER, FOR SOLUTION INTRAMUSCULAR; INTRAVENOUS at 02:21

## 2018-08-30 RX ADMIN — SODIUM CHLORIDE 100 ML/HR: 900 INJECTION, SOLUTION INTRAVENOUS at 05:47

## 2018-08-30 NOTE — LETTER
9/1/2018 [id number] [facility] [address] Dear Ms. Russmaureen Katey, You were recently seen in the Emergency Department of [facility] and had blood work or x-rays performed. We would like to discuss these with you. Please call the Emergency Department at your earliest convenience. If you were seen at [facility], please dial [phone], and if you were seen at [facility], please call [phone] to speak with one of our providers. Sincerely, Dela Habermann, MD 
Medical Director Emergency Services, [address] [place] [address]  
[phone]/ [phone]

## 2018-08-30 NOTE — ED NOTES
Left message on patient's cell phone regarding need to return to the ED to be treated appropriately.

## 2018-08-30 NOTE — ED NOTES
Pt requesting to leave. Pt is clammy, cold, and agitated. MD informed and states she is welcome to leave. Will have pt sign AMA paperwork.

## 2018-08-30 NOTE — ED TRIAGE NOTES
Pt reports shortness of breath and dizziness x 3 days. Pt also reports urinary pain and fever. Pt report history of CHF. Kamila Winters RN

## 2018-08-30 NOTE — PROGRESS NOTES
Notified by nursing staff that patient wishes to leave AMA. Nursing to discuss [place] papers with patient.

## 2018-08-30 NOTE — Clinical Note
Patient Class[de-identified] Observation [769] Type of Bed: Medical [8] Reason for Observation: UTI Admitting Diagnosis: UTI (urinary tract infection) [165203] Admitting Physician: Jazzmine Fay [[id number]] Attending Physician: Jazzmine Fay [[id number]]

## 2018-08-30 NOTE — H&P
HOSPITALIST INITIAL HISTORY AND PHYSICAL 
 
NAME:  Frankie Portillo Age:  28 y.o. 
:   1985 MRN:   [id number] PCP: None Consulting MD: Treatment Team: Attending Provider: Shad Blanton MD; Primary Nurse: Maury Soliman RN 
 
CHIEF COMPLAINT: back pain, heart racing HISTORY OF PRESENT ILLNESS:  
Frankie Portillo is a 28 y.o. female with a past medical history of polysubstance abuse and heroin dependence, severe cardiomyopathy with LVEF of 5-10% on last TTE in April who presents to the ER with complaint of R flank pain, fevers, chills, body aches for the past 2-3 days. She denies any nausea or vomiting. Reports that this feels very similar to previous UTI she has had. Of note, the patient was admitted in April (during which visit she was originally diagnosed with sCHF) and she left AMA twice (after being readmitted to her room once). REVIEW OF SYSTEMS: Comprehensive ROS performed and negative except as stated in HPI. Past Medical History:  
Diagnosis Date  Anxiety  Hypertension  Methamphetamine abuse  Neurological disorder   
 migraines  Polysubstance abuse Past Surgical History:  
Procedure Laterality Date  HX APPENDECTOMY  HX GYN    
  Prior to Admission Medications Prescriptions Last Dose Informant Patient Reported? Taking? albuterol (PROVENTIL HFA, VENTOLIN HFA, PROAIR HFA) 90 mcg/actuation inhaler Not Taking at Unknown time  No No  
Sig: Take 2 Puffs by inhalation every six (6) hours as needed for Wheezing. cephALEXin (KEFLEX) 500 mg capsule Not Taking at Unknown time  No No  
Sig: Take 1 Cap by mouth three (3) times daily. methadone HCl (METHADONE PO)   Yes Yes Sig: Take 55 mg by mouth.  
predniSONE (STERAPRED DS) 10 mg dose pack Not Taking at Unknown time  No No  
Sig: Please take as directed on package. Please clarify any questions with the Pharmacist.  
  
Facility-Administered Medications: None Allergies Allergen Reactions  Latex Unknown (comments)  Sulfa (Sulfonamide Antibiotics) Rash FAMILY HISTORY: Reviewed. Negative except History reviewed. No pertinent family history. Social History Substance Use Topics  Smoking status: Current Every Day Smoker Packs/day: 1.00  Smokeless tobacco: Never Used  Alcohol use Yes Comment: rarely Objective:  
 
Visit Vitals  /82  Pulse (!) 121  Temp 99.7 °F (37.6 °C)  Resp 13  Ht 6' (1.829 m)  Wt 77.1 kg (170 lb)  SpO2 97%  BMI 23.06 kg/m2 Temp (24hrs), Av.7 °F (37.6 °C), Min:99.7 °F (37.6 °C), Max:99.7 °F (37.6 °C) Oxygen Therapy O2 Sat (%): 97 % (18 0201) Physical Exam: 
General:    The patient is a pleasant young female in no acute distress. Head:   Normocephalic/atraumatic. Eyes:  No palpebral pallor or scleral icterus. ENT:  External auricular and nasal exam within normal limits. Mucous membranes are moist. 
Neck:  Supple, non-tender, no JVD. Lungs:   Clear to auscultation bilaterally without wheezes or crackles. No respiratory distress or accessory muscle use. Heart:   Regular rate and rhythm, without murmurs, rubs, or gallops. Abdomen:   Soft, non-tender, non-distended with normoactive bowel sounds. Genitourinary: Mild TTP over bladder and R CVA. Extremities: Without clubbing, cyanosis, or edema. Skin:     Normal color, texture, and turgor. No rashes, lesions, or jaundice. Pulses: Radial and dorsalis pedis pulses present 2+ bilaterally. Capillary refill <2s. Neurologic: CN II-XII grossly intact and symmetrical.  
  Moving all four extremities well with no focal deficits. Psychiatric: Pleasant demeanor, appropriate affect. Alert and oriented x 3 Data Review:  
Recent Results (from the past 24 hour(s)) URINE MICROSCOPIC Collection Time: 18 12:37 AM  
Result Value Ref Range WBC 20-50 0 /hpf  
 RBC 0-3 0 /hpf  Epithelial cells 5-10 0 /hpf  
 Bacteria TRACE 0 /hpf Casts 5-10 0 /lpf Crystals, urine 0 0 /LPF Mucus 0 0 /lpf Other observations RESULTS VERIFIED MANUALLY    
HCG URINE, QL. - POC Collection Time: 08/30/18 12:39 AM  
Result Value Ref Range Pregnancy test,urine (POC) NEGATIVE  NEG    
METABOLIC PANEL, COMPREHENSIVE Collection Time: 08/30/18 12:49 AM  
Result Value Ref Range Sodium 136 136 - 145 mmol/L Potassium 4.3 3.5 - 5.1 mmol/L Chloride 100 98 - 107 mmol/L  
 CO2 25 21 - 32 mmol/L Anion gap 11 7 - 16 mmol/L Glucose 108 (H) 65 - 100 mg/dL BUN 16 6 - 23 MG/DL Creatinine 0.87 0.6 - 1.0 MG/DL  
 GFR est AA >60 >60 ml/min/1.73m2 GFR est non-AA >60 >60 ml/min/1.73m2 Calcium 8.1 (L) 8.3 - 10.4 MG/DL Bilirubin, total 0.7 0.2 - 1.1 MG/DL  
 ALT (SGPT) 55 12 - 65 U/L  
 AST (SGOT) 46 (H) 15 - 37 U/L Alk. phosphatase 121 50 - 136 U/L Protein, total 7.6 6.3 - 8.2 g/dL Albumin 3.0 (L) 3.5 - 5.0 g/dL Globulin 4.6 (H) 2.3 - 3.5 g/dL A-G Ratio 0.7 (L) 1.2 - 3.5    
CBC WITH AUTOMATED DIFF Collection Time: 08/30/18 12:49 AM  
Result Value Ref Range WBC 11.4 (H) 4.3 - 11.1 K/uL  
 RBC 4.34 4.05 - 5.2 M/uL  
 HGB 11.4 (L) 11.7 - 15.4 g/dL HCT 36.7 35.8 - 46.3 % MCV 84.6 79.6 - 97.8 FL  
 MCH 26.3 26.1 - 32.9 PG  
 MCHC 31.1 (L) 31.4 - 35.0 g/dL  
 RDW 17.3 % PLATELET 359 423 - 733 K/uL MPV 9.6 9.4 - 12.3 FL ABSOLUTE NRBC 0.00 0.0 - 0.2 K/uL  
 DF AUTOMATED NEUTROPHILS 78 43 - 78 % LYMPHOCYTES 13 13 - 44 % MONOCYTES 8 4.0 - 12.0 % EOSINOPHILS 0 (L) 0.5 - 7.8 % BASOPHILS 0 0.0 - 2.0 % IMMATURE GRANULOCYTES 0 0.0 - 5.0 %  
 ABS. NEUTROPHILS 8.9 (H) 1.7 - 8.2 K/UL  
 ABS. LYMPHOCYTES 1.5 0.5 - 4.6 K/UL  
 ABS. MONOCYTES 0.9 0.1 - 1.3 K/UL  
 ABS. EOSINOPHILS 0.0 0.0 - 0.8 K/UL  
 ABS. BASOPHILS 0.0 0.0 - 0.2 K/UL  
 ABS. IMM. GRANS. 0.0 0.0 - 0.5 K/UL BNP Collection Time: 08/30/18 12:49 AM  
Result Value Ref Range  pg/mL TROPONIN I  
 Collection Time: 08/30/18 12:49 AM  
Result Value Ref Range Troponin-I, Qt. <0.04 0.02 - 0.05 NG/ML  
POC LACTIC ACID Collection Time: 08/30/18  1:08 AM  
Result Value Ref Range Lactic Acid (POC) 0.9 0.5 - 1.9 mmol/L  
CULTURE, BLOOD Collection Time: 08/30/18  1:29 AM  
Result Value Ref Range Special Requests: RIGHT ANTECUBITAL Culture result: PENDING   
CULTURE, BLOOD Collection Time: 08/30/18  1:31 AM  
Result Value Ref Range Special Requests: RIGHT FOREARM Culture result: PENDING Imaging Vivek Serve /Studies: No results found. Assessment and Plan:  
 
Principal Problem: 
  UTI (urinary tract infection) (8/30/2018) IV Rocephin, IVF, urine culture pending. Active Problems: 
  Sinus tachycardia (8/30/2018) Likely related to acute infectious process. Follow vitals. Leukocytosis (8/30/2018) Mild, follow CBC. Dilated cardiomyopathy ([facility] [address] (8/30/2018) Stable, probably a bit volume depleted. Gentle IVF. Systolic CHF, chronic ([address] (8/30/2018) Gentle IVF per above. Pt has not been on cardiology meds for quite some time. Per recent cardiology note, pt prescribed Lasix 20 daily, Lisinopril 5 daily, Toprol- daily, Spironolactone 12.5 daily. Will resume these except Lasix. Plan to restart at discharge. Heroin abuse (4/10/2018) Pt taking Methadone now. Will not start any additional opioids. Will check UDS. DVT Prophylaxis: Lovenox Code Status: FULL CODE Disposition: Observe on med/surg for evaluation and treatment as per above. Anticipated discharge: <2 midnights Signed By: Prudence Mcgovern MD   
 August 30, 2018

## 2018-08-30 NOTE — ED PROVIDER NOTES
HPI Comments: 67-year-old lady presents with concerns about feeling short of breath, like her heart is racing, like she has a UTI. Patient says she's had UTIs in the past and this feels similar. She also now she has a history of polysubstance abuse and has developed a cardiomyopathy from it. She reports being out of all of her heart medicines and not having had any in a couple of months. She denies any fevers but says she has had chills. Denies any vomiting or diarrhea but has had some mild nausea. Elements of this note were created using speech recognition software. As such, errors of speech recognition may be present. Patient is a 28 y.o. female presenting with shortness of breath. The history is provided by the patient. Shortness of Breath Associated symptoms include chest pain. Pertinent negatives include no fever and no vomiting. Past Medical History:  
Diagnosis Date  Anxiety  Hypertension  Methamphetamine abuse  Neurological disorder   
 migraines  Polysubstance abuse Past Surgical History:  
Procedure Laterality Date  HX APPENDECTOMY  HX GYN    
  History reviewed. No pertinent family history. Social History Social History  Marital status: LEGALLY  Spouse name: N/A  
 Number of children: N/A  
 Years of education: N/A Occupational History  Not on file. Social History Main Topics  Smoking status: Current Every Day Smoker Packs/day: 1.00  Smokeless tobacco: Never Used  Alcohol use Yes Comment: rarely  Drug use: Yes Special: Heroin, Cocaine, Methamphetamines  Sexual activity: Yes  
  Partners: Female, Male Birth control/ protection: Condom Other Topics Concern  Not on file Social History Narrative ALLERGIES: Latex and Sulfa (sulfonamide antibiotics) Review of Systems Constitutional: Positive for activity change, appetite change and chills. Negative for fever. Respiratory: Positive for shortness of breath. Cardiovascular: Positive for chest pain and palpitations. Gastrointestinal: Positive for nausea. Negative for diarrhea and vomiting. Genitourinary: Positive for dysuria. Negative for difficulty urinating. Musculoskeletal: Positive for myalgias. Negative for arthralgias and joint swelling. Skin: Negative. Vitals:  
 08/30/18 3701 08/30/18 0144 08/30/18 0154 08/30/18 0155 BP:   123/82 123/82 Pulse: (!) 125 (!) 121 (!) 127 (!) 128 Resp:  13 Temp:      
SpO2: 97%   96% Weight:      
Height:      
      
 
Physical Exam  
Constitutional: She is oriented to person, place, and time. She appears well-developed and well-nourished. HENT:  
Head: Normocephalic and atraumatic. Eyes: Conjunctivae and EOM are normal. Pupils are equal, round, and reactive to light. Neck: Normal range of motion. Cardiovascular: Regular rhythm. Very tachycardic Pulmonary/Chest: Effort normal and breath sounds normal. No respiratory distress. She has no wheezes. She has no rales. She exhibits no tenderness. Abdominal: Soft. Bowel sounds are normal. There is no tenderness. There is no rebound and no guarding. Musculoskeletal: Normal range of motion. She exhibits no edema or tenderness. Lymphadenopathy:  
  She has no cervical adenopathy. Neurological: She is alert and oriented to person, place, and time. Skin: Skin is warm and dry. Psychiatric: She has a normal mood and affect. Nursing note and vitals reviewed. MDM Number of Diagnoses or Management Options Diagnosis management comments: Patient's heart rate came down from the 140 to the 120 range after the first dose of metoprolol. Her pressure has remained steady so I will give her a second dose. Her urine shows evidence for infection with remainder of her tests are essentially unremarkable. Chest x-ray pending.  
 
2:13 AM 
 I discussed the case with the hospitalist who kindly agreed to see the patient. ED Course Procedures

## 2018-08-30 NOTE — ED NOTES
Pt requesting Tylenol for back pain. Informed pt I will check to see what PRN meds the hospitalist has ordered.

## 2018-08-31 NOTE — PROGRESS NOTES
Both of the patient's blood cultures were positive yesterday. I did call and leave a voicemail for patient to return the call and that it was emergent that she do so.

## 2018-09-01 LAB
BACTERIA SPEC CULT: ABNORMAL
BACTERIA SPEC CULT: NORMAL
GRAM STN SPEC: ABNORMAL
SERVICE CMNT-IMP: ABNORMAL
SERVICE CMNT-IMP: ABNORMAL
SERVICE CMNT-IMP: NORMAL

## 2018-10-14 ENCOUNTER — APPOINTMENT (OUTPATIENT)
Dept: GENERAL RADIOLOGY | Age: 33
DRG: 871 | End: 2018-10-14
Attending: EMERGENCY MEDICINE
Payer: SUBSIDIZED

## 2018-10-14 ENCOUNTER — HOSPITAL ENCOUNTER (INPATIENT)
Age: 33
LOS: 22 days | Discharge: HOME | DRG: 871 | End: 2018-11-05
Attending: EMERGENCY MEDICINE | Admitting: INTERNAL MEDICINE
Payer: SUBSIDIZED

## 2018-10-14 DIAGNOSIS — B95.61 MSSA BACTEREMIA: ICD-10-CM

## 2018-10-14 DIAGNOSIS — E87.1 HYPONATREMIA: ICD-10-CM

## 2018-10-14 DIAGNOSIS — R94.31 PROLONGED Q-T INTERVAL ON ECG: ICD-10-CM

## 2018-10-14 DIAGNOSIS — E77.8 HYPOPROTEINEMIA (HCC): ICD-10-CM

## 2018-10-14 DIAGNOSIS — I33.0 ENDOCARDITIS DUE TO METHICILLIN SUSCEPTIBLE STAPHYLOCOCCUS AUREUS (MSSA): ICD-10-CM

## 2018-10-14 DIAGNOSIS — J90 PLEURAL EFFUSION: ICD-10-CM

## 2018-10-14 DIAGNOSIS — J96.01 ACUTE RESPIRATORY FAILURE WITH HYPOXIA (HCC): ICD-10-CM

## 2018-10-14 DIAGNOSIS — E87.6 HYPOKALEMIA: ICD-10-CM

## 2018-10-14 DIAGNOSIS — I50.9 ACUTE ON CHRONIC CONGESTIVE HEART FAILURE, UNSPECIFIED HEART FAILURE TYPE (HCC): ICD-10-CM

## 2018-10-14 DIAGNOSIS — R78.81 MSSA BACTEREMIA: ICD-10-CM

## 2018-10-14 DIAGNOSIS — F11.10 HEROIN ABUSE (HCC): ICD-10-CM

## 2018-10-14 DIAGNOSIS — E83.42 HYPOMAGNESEMIA: ICD-10-CM

## 2018-10-14 DIAGNOSIS — R65.20 SEVERE SEPSIS (HCC): ICD-10-CM

## 2018-10-14 DIAGNOSIS — R06.02 SOB (SHORTNESS OF BREATH): Primary | ICD-10-CM

## 2018-10-14 DIAGNOSIS — A41.9 SEVERE SEPSIS (HCC): ICD-10-CM

## 2018-10-14 DIAGNOSIS — I50.23 SYSTOLIC CHF, ACUTE ON CHRONIC (HCC): ICD-10-CM

## 2018-10-14 DIAGNOSIS — B95.61 ENDOCARDITIS DUE TO METHICILLIN SUSCEPTIBLE STAPHYLOCOCCUS AUREUS (MSSA): ICD-10-CM

## 2018-10-14 DIAGNOSIS — A41.9 SEPSIS, DUE TO UNSPECIFIED ORGANISM: ICD-10-CM

## 2018-10-14 PROBLEM — M00.9 SEPTIC ARTHRITIS (HCC): Status: ACTIVE | Noted: 2018-10-14

## 2018-10-14 PROBLEM — R74.01 TRANSAMINITIS: Status: ACTIVE | Noted: 2018-10-14

## 2018-10-14 LAB
ALBUMIN SERPL-MCNC: 1.8 G/DL (ref 3.5–5)
ALBUMIN/GLOB SERPL: 0.4 {RATIO}
ALP SERPL-CCNC: 109 U/L (ref 50–130)
ALT SERPL-CCNC: 122 U/L (ref 12–65)
AMPHET UR QL SCN: NEGATIVE
ANION GAP SERPL CALC-SCNC: 10 MMOL/L
AST SERPL-CCNC: 55 U/L (ref 15–37)
ATRIAL RATE: 114 BPM
BARBITURATES UR QL SCN: NEGATIVE
BASOPHILS # BLD: 0 K/UL (ref 0–0.2)
BASOPHILS NFR BLD: 0 % (ref 0–2)
BENZODIAZ UR QL: NEGATIVE
BILIRUB SERPL-MCNC: 2.6 MG/DL (ref 0.2–1.1)
BNP SERPL-MCNC: 956 PG/ML
BUN SERPL-MCNC: 16 MG/DL (ref 6–23)
CALCIUM SERPL-MCNC: 8 MG/DL (ref 8.3–10.4)
CALCULATED P AXIS, ECG09: 47 DEGREES
CALCULATED R AXIS, ECG10: 88 DEGREES
CALCULATED T AXIS, ECG11: -26 DEGREES
CANNABINOIDS UR QL SCN: NEGATIVE
CHLORIDE SERPL-SCNC: 93 MMOL/L (ref 98–107)
CO2 SERPL-SCNC: 28 MMOL/L (ref 21–32)
COCAINE UR QL SCN: POSITIVE
CREAT SERPL-MCNC: 0.86 MG/DL (ref 0.6–1)
DIAGNOSIS, 93000: NORMAL
DIFFERENTIAL METHOD BLD: ABNORMAL
EOSINOPHIL # BLD: 0 K/UL (ref 0–0.8)
EOSINOPHIL NFR BLD: 0 % (ref 0.5–7.8)
ERYTHROCYTE [DISTWIDTH] IN BLOOD BY AUTOMATED COUNT: 19.5 %
GLOBULIN SER CALC-MCNC: 5.1 G/DL (ref 2.3–3.5)
GLUCOSE SERPL-MCNC: 112 MG/DL (ref 65–100)
HCG SERPL QL: NEGATIVE
HCT VFR BLD AUTO: 30.1 % (ref 35.8–46.3)
HGB BLD-MCNC: 9 G/DL (ref 11.7–15.4)
IMM GRANULOCYTES # BLD: 0.1 K/UL (ref 0–0.5)
IMM GRANULOCYTES NFR BLD AUTO: 1 % (ref 0–5)
LACTATE BLD-SCNC: 2.5 MMOL/L (ref 0.5–1.9)
LYMPHOCYTES # BLD: 1.5 K/UL (ref 0.5–4.6)
LYMPHOCYTES NFR BLD: 12 % (ref 13–44)
MCH RBC QN AUTO: 26 PG (ref 26.1–32.9)
MCHC RBC AUTO-ENTMCNC: 29.9 G/DL (ref 31.4–35)
MCV RBC AUTO: 87 FL (ref 79.6–97.8)
METHADONE UR QL: POSITIVE
MONOCYTES # BLD: 1.2 K/UL (ref 0.1–1.3)
MONOCYTES NFR BLD: 10 % (ref 4–12)
NEUTS SEG # BLD: 9.2 K/UL (ref 1.7–8.2)
NEUTS SEG NFR BLD: 76 % (ref 43–78)
NRBC # BLD: 0 K/UL (ref 0–0.2)
OPIATES UR QL: POSITIVE
P-R INTERVAL, ECG05: 166 MS
PCP UR QL: NEGATIVE
PLATELET # BLD AUTO: 234 K/UL (ref 150–450)
PMV BLD AUTO: 9.8 FL (ref 9.4–12.3)
POTASSIUM SERPL-SCNC: 4.1 MMOL/L (ref 3.5–5.1)
PROCALCITONIN SERPL-MCNC: 0.5 NG/ML
PROT SERPL-MCNC: 6.9 G/DL
Q-T INTERVAL, ECG07: 330 MS
QRS DURATION, ECG06: 112 MS
QTC CALCULATION (BEZET), ECG08: 454 MS
RBC # BLD AUTO: 3.46 M/UL (ref 4.05–5.2)
SODIUM SERPL-SCNC: 131 MMOL/L (ref 136–145)
TROPONIN I BLD-MCNC: 0 NG/ML (ref 0.02–0.05)
VENTRICULAR RATE, ECG03: 114 BPM
WBC # BLD AUTO: 12 K/UL (ref 4.3–11.1)

## 2018-10-14 PROCEDURE — 74011250636 HC RX REV CODE- 250/636: Performed by: EMERGENCY MEDICINE

## 2018-10-14 PROCEDURE — 74011000250 HC RX REV CODE- 250: Performed by: FAMILY MEDICINE

## 2018-10-14 PROCEDURE — 84484 ASSAY OF TROPONIN QUANT: CPT

## 2018-10-14 PROCEDURE — 74011250637 HC RX REV CODE- 250/637: Performed by: FAMILY MEDICINE

## 2018-10-14 PROCEDURE — 77030020263 HC SOL INJ SOD CL0.9% LFCR 1000ML

## 2018-10-14 PROCEDURE — 80053 COMPREHEN METABOLIC PANEL: CPT

## 2018-10-14 PROCEDURE — 84703 CHORIONIC GONADOTROPIN ASSAY: CPT

## 2018-10-14 PROCEDURE — 65610000001 HC ROOM ICU GENERAL

## 2018-10-14 PROCEDURE — 94664 DEMO&/EVAL PT USE INHALER: CPT

## 2018-10-14 PROCEDURE — 83880 ASSAY OF NATRIURETIC PEPTIDE: CPT

## 2018-10-14 PROCEDURE — 87040 BLOOD CULTURE FOR BACTERIA: CPT

## 2018-10-14 PROCEDURE — 94640 AIRWAY INHALATION TREATMENT: CPT

## 2018-10-14 PROCEDURE — 96365 THER/PROPH/DIAG IV INF INIT: CPT | Performed by: EMERGENCY MEDICINE

## 2018-10-14 PROCEDURE — 85025 COMPLETE CBC W/AUTO DIFF WBC: CPT

## 2018-10-14 PROCEDURE — 74011000258 HC RX REV CODE- 258: Performed by: EMERGENCY MEDICINE

## 2018-10-14 PROCEDURE — 83605 ASSAY OF LACTIC ACID: CPT

## 2018-10-14 PROCEDURE — 74011250636 HC RX REV CODE- 250/636: Performed by: FAMILY MEDICINE

## 2018-10-14 PROCEDURE — 93005 ELECTROCARDIOGRAM TRACING: CPT | Performed by: EMERGENCY MEDICINE

## 2018-10-14 PROCEDURE — 99284 EMERGENCY DEPT VISIT MOD MDM: CPT | Performed by: EMERGENCY MEDICINE

## 2018-10-14 PROCEDURE — 84145 PROCALCITONIN (PCT): CPT

## 2018-10-14 PROCEDURE — 71046 X-RAY EXAM CHEST 2 VIEWS: CPT

## 2018-10-14 PROCEDURE — 80307 DRUG TEST PRSMV CHEM ANLYZR: CPT

## 2018-10-14 RX ORDER — FUROSEMIDE 40 MG/1
40 TABLET ORAL DAILY
COMMUNITY
End: 2018-11-05

## 2018-10-14 RX ORDER — SPIRONOLACTONE 25 MG/1
25 TABLET ORAL DAILY
Status: DISCONTINUED | OUTPATIENT
Start: 2018-10-15 | End: 2018-10-18

## 2018-10-14 RX ORDER — LISINOPRIL 5 MG/1
5 TABLET ORAL DAILY
Status: DISCONTINUED | OUTPATIENT
Start: 2018-10-15 | End: 2018-10-16

## 2018-10-14 RX ORDER — CARVEDILOL 3.12 MG/1
3.12 TABLET ORAL 2 TIMES DAILY WITH MEALS
Status: DISCONTINUED | OUTPATIENT
Start: 2018-10-14 | End: 2018-10-16

## 2018-10-14 RX ORDER — SODIUM CHLORIDE 0.9 % (FLUSH) 0.9 %
5-10 SYRINGE (ML) INJECTION AS NEEDED
Status: DISCONTINUED | OUTPATIENT
Start: 2018-10-14 | End: 2018-10-25 | Stop reason: HOSPADM

## 2018-10-14 RX ORDER — CARVEDILOL 3.12 MG/1
TABLET ORAL 2 TIMES DAILY WITH MEALS
COMMUNITY
End: 2018-11-05

## 2018-10-14 RX ORDER — FUROSEMIDE 10 MG/ML
60 INJECTION INTRAMUSCULAR; INTRAVENOUS DAILY
Status: DISCONTINUED | OUTPATIENT
Start: 2018-10-15 | End: 2018-10-16

## 2018-10-14 RX ORDER — VANCOMYCIN 2 GRAM/500 ML IN 0.9 % SODIUM CHLORIDE INTRAVENOUS
2000 ONCE
Status: DISCONTINUED | OUTPATIENT
Start: 2018-10-14 | End: 2018-10-14

## 2018-10-14 RX ORDER — ACETAMINOPHEN 325 MG/1
650 TABLET ORAL
Status: DISCONTINUED | OUTPATIENT
Start: 2018-10-14 | End: 2018-10-25 | Stop reason: HOSPADM

## 2018-10-14 RX ORDER — SODIUM CHLORIDE 0.9 % (FLUSH) 0.9 %
5-10 SYRINGE (ML) INJECTION EVERY 8 HOURS
Status: DISCONTINUED | OUTPATIENT
Start: 2018-10-14 | End: 2018-10-25 | Stop reason: HOSPADM

## 2018-10-14 RX ORDER — MORPHINE SULFATE 8 MG/ML
4 INJECTION, SOLUTION INTRAMUSCULAR; INTRAVENOUS
Status: COMPLETED | OUTPATIENT
Start: 2018-10-14 | End: 2018-10-14

## 2018-10-14 RX ORDER — FUROSEMIDE 10 MG/ML
60 INJECTION INTRAMUSCULAR; INTRAVENOUS ONCE
Status: COMPLETED | OUTPATIENT
Start: 2018-10-14 | End: 2018-10-14

## 2018-10-14 RX ORDER — METHADONE HYDROCHLORIDE 10 MG/1
35 TABLET ORAL DAILY
Status: DISCONTINUED | OUTPATIENT
Start: 2018-10-15 | End: 2018-10-18

## 2018-10-14 RX ORDER — SPIRONOLACTONE 25 MG/1
25 TABLET ORAL DAILY
COMMUNITY
End: 2018-11-05

## 2018-10-14 RX ORDER — HEPARIN SODIUM 5000 [USP'U]/ML
5000 INJECTION, SOLUTION INTRAVENOUS; SUBCUTANEOUS EVERY 8 HOURS
Status: DISCONTINUED | OUTPATIENT
Start: 2018-10-14 | End: 2018-10-16

## 2018-10-14 RX ORDER — LISINOPRIL 5 MG/1
5 TABLET ORAL DAILY
COMMUNITY
End: 2018-11-05

## 2018-10-14 RX ORDER — CEFAZOLIN SODIUM/WATER 2 G/20 ML
2 SYRINGE (ML) INTRAVENOUS EVERY 8 HOURS
Status: DISCONTINUED | OUTPATIENT
Start: 2018-10-14 | End: 2018-10-25 | Stop reason: HOSPADM

## 2018-10-14 RX ORDER — IPRATROPIUM BROMIDE AND ALBUTEROL SULFATE 2.5; .5 MG/3ML; MG/3ML
3 SOLUTION RESPIRATORY (INHALATION)
Status: DISCONTINUED | OUTPATIENT
Start: 2018-10-14 | End: 2018-10-15

## 2018-10-14 RX ADMIN — IPRATROPIUM BROMIDE AND ALBUTEROL SULFATE 3 ML: .5; 3 SOLUTION RESPIRATORY (INHALATION) at 19:37

## 2018-10-14 RX ADMIN — SODIUM CHLORIDE 500 ML: 900 INJECTION, SOLUTION INTRAVENOUS at 13:18

## 2018-10-14 RX ADMIN — Medication 2 G: at 16:24

## 2018-10-14 RX ADMIN — Medication 10 ML: at 22:25

## 2018-10-14 RX ADMIN — FUROSEMIDE 60 MG: 10 INJECTION, SOLUTION INTRAMUSCULAR; INTRAVENOUS at 15:03

## 2018-10-14 RX ADMIN — IPRATROPIUM BROMIDE AND ALBUTEROL SULFATE 3 ML: .5; 3 SOLUTION RESPIRATORY (INHALATION) at 23:48

## 2018-10-14 RX ADMIN — PIPERACILLIN SODIUM,TAZOBACTAM SODIUM 4.5 G: 4; .5 INJECTION, POWDER, FOR SOLUTION INTRAVENOUS at 13:18

## 2018-10-14 RX ADMIN — Medication 10 ML: at 15:06

## 2018-10-14 RX ADMIN — IPRATROPIUM BROMIDE AND ALBUTEROL SULFATE 3 ML: .5; 3 SOLUTION RESPIRATORY (INHALATION) at 16:02

## 2018-10-14 RX ADMIN — CARVEDILOL 3.12 MG: 3.12 TABLET, FILM COATED ORAL at 16:24

## 2018-10-14 RX ADMIN — HEPARIN SODIUM 5000 UNITS: 5000 INJECTION INTRAVENOUS; SUBCUTANEOUS at 22:23

## 2018-10-14 RX ADMIN — Medication 4 MG: at 13:53

## 2018-10-14 RX ADMIN — Medication 2 G: at 23:35

## 2018-10-14 NOTE — PROGRESS NOTES
TRANSFER - IN REPORT: 
 
Verbal report received from Farhana North RN(name) on Cheral Confer  being received from ED(unit) for routine progression of care Report consisted of patients Situation, Background, Assessment and  
Recommendations(SBAR). Information from the following report(s) SBAR, Kardex, ED Summary, Intake/Output, MAR, Med Rec Status and Cardiac Rhythm ST was reviewed with the receiving nurse. Opportunity for questions and clarification was provided. Assessment completed upon patients arrival to unit and care assumed.

## 2018-10-14 NOTE — PROGRESS NOTES
Pt to 374 from ED. Currently A&O, denying pain. Ambulated to bathroom with no assistance on arrival. -110 on monitor, BP stable, afebrile. Placed on [facility] for mild dyspnea. Full assessment as charted. Dual skin assessment completed with Sonia Sol RN. Multiple small scars present to BUE. 2+ pitting edema to BLE. No signs of pressure injury noted.

## 2018-10-14 NOTE — PROGRESS NOTES
Per primary nurse patient has working iv at this time, blood cultures drawn today. Will continue to monitor for picc placement.

## 2018-10-14 NOTE — PROGRESS NOTES
SW saw pt being admitted to ICU and is familiar with her from previous admissions. Case management to meet with patient when appropriate as she did not appear as though she could participate in a discharge plan at this time. Maryan Bautista, BSW  [facility] [phone]

## 2018-10-14 NOTE — ED PROVIDER NOTES
HPI Comments: 69-year-old female complains of increasing shortness of breath with swelling in her legs and some chest discomfort with breathing as progressing over the last few weeks. She's had subjective fever. She's had no vomiting or diarrhea. Of interest her past history is significant for IV drug abuse with cardiomyopathy, bacterial endocarditis treatment. She says the shortness of breath and she got worse and she could not tolerate resting at home. Patient is a 28 y.o. female presenting with shortness of breath. The history is provided by the patient. Shortness of Breath This is a new problem. The problem occurs continuously. The problem has been gradually worsening. Associated symptoms include a fever, cough, wheezing, PND, orthopnea, chest pain, leg pain and leg swelling. Pertinent negatives include no headaches, no neck pain, no sputum production, no hemoptysis, no syncope, no vomiting, no abdominal pain and no rash. Associated medical issues include heart failure. Associated medical issues do not include COPD, PE, CAD or DVT. Past Medical History:  
Diagnosis Date  Anxiety  Hypertension  Methamphetamine abuse  Neurological disorder   
 migraines  Polysubstance abuse Past Surgical History:  
Procedure Laterality Date  HX APPENDECTOMY  HX GYN    
  No family history on file. Social History Social History  Marital status: LEGALLY  Spouse name: N/A  
 Number of children: N/A  
 Years of education: N/A Occupational History  Not on file. Social History Main Topics  Smoking status: Current Every Day Smoker Packs/day: 1.00  Smokeless tobacco: Never Used  Alcohol use Yes Comment: rarely  Drug use: Yes Special: Heroin, Cocaine, Methamphetamines  Sexual activity: Yes  
  Partners: Female, Male Birth control/ protection: Condom Other Topics Concern  Not on file Social History Narrative ALLERGIES: Latex and Sulfa (sulfonamide antibiotics) Review of Systems Constitutional: Positive for chills and fever. Respiratory: Positive for cough, shortness of breath and wheezing. Negative for hemoptysis and sputum production. Cardiovascular: Positive for chest pain, orthopnea, leg swelling and PND. Negative for palpitations and syncope. Gastrointestinal: Negative for abdominal pain, diarrhea and vomiting. Genitourinary: Negative for dysuria and flank pain. Musculoskeletal: Negative for back pain and neck pain. Skin: Negative for color change and rash. Neurological: Negative for syncope and headaches. All other systems reviewed and are negative. There were no vitals filed for this visit. Physical Exam  
Constitutional: She is oriented to person, place, and time. She appears well-developed and well-nourished. No distress. HENT:  
Head: Normocephalic and atraumatic. Right Ear: External ear normal.  
Left Ear: External ear normal.  
Mouth/Throat: Oropharynx is clear and moist. No oropharyngeal exudate. Eyes: Conjunctivae and EOM are normal. Pupils are equal, round, and reactive to light. Neck: Normal range of motion. Neck supple. Cardiovascular: Normal rate, regular rhythm and intact distal pulses. No murmur heard. Pulmonary/Chest: No respiratory distress. She has decreased breath sounds in the right upper field, the right middle field and the right lower field. She has wheezes in the left middle field. She has rhonchi in the right upper field, the right middle field and the right lower field. She has rales in the right upper field, the right middle field, the right lower field and the left lower field. Overall, some bibasilar bowels with some rhonchi and wheezing this morning the right. With diminished breath sounds. Abdominal: Soft. Bowel sounds are normal. She exhibits no mass.  There is no tenderness. There is no rebound and no guarding. No hernia. Musculoskeletal: She exhibits edema and tenderness. 2+ pitting edema both lower extremities with tenderness but but no erythema or cords. Neurological: She is alert and oriented to person, place, and time. Gait normal.  
Nl speech Skin: Skin is warm and dry. Psychiatric: She has a normal mood and affect. Her speech is normal.  
Nursing note and vitals reviewed. MDM Number of Diagnoses or Management Options Acute on chronic congestive heart failure, unspecified heart failure type [facility]):  
Severe sepsis [facility]):  
SOB (shortness of breath):  
Diagnosis management comments: Assessment pneumonia, effusion, congestive heart failure. Concern for persistent bacterial endocarditis. Chest x-ray oxygen nebulizer treatment screening blood work Amount and/or Complexity of Data Reviewed Clinical lab tests: ordered and reviewed Tests in the radiology section of CPT®: ordered and reviewed Tests in the medicine section of CPT®: ordered and reviewed Review and summarize past medical records: yes Independent visualization of images, tracings, or specimens: yes Risk of Complications, Morbidity, and/or Mortality Presenting problems: moderate Diagnostic procedures: low Management options: moderate General comments: EKG reveals sinus tachycardia 110. No ST-T changes or ectopy. Critical Care Total time providing critical care: 30-74 minutes ED Course Procedures Revealed old records which revealed an admission to the hospital from September 4 through the 28th for bacterial endocarditis, sepsis. Patient was using IV heroin during the hospitalization. She was see cardiology regarding an AICD but did not stay for evaluation. History of rectal or disease, congestive heart failure endocarditis and cardiomegaly. Results Include: 
 
Recent Results (from the past 24 hour(s)) POC TROPONIN-I  
 Collection Time: 10/14/18 12:39 PM  
Result Value Ref Range Troponin-I (POC) 0 (L) 0.02 - 0.05 ng/ml POC LACTIC ACID Collection Time: 10/14/18 12:41 PM  
Result Value Ref Range Lactic Acid (POC) 2.5 (H) 0.5 - 1.9 mmol/L  
CBC WITH AUTOMATED DIFF Collection Time: 10/14/18 12:50 PM  
Result Value Ref Range WBC 12.0 (H) 4.3 - 11.1 K/uL  
 RBC 3.46 (L) 4.05 - 5.2 M/uL HGB 9.0 (L) 11.7 - 15.4 g/dL HCT 30.1 (L) 35.8 - 46.3 % MCV 87.0 79.6 - 97.8 FL  
 MCH 26.0 (L) 26.1 - 32.9 PG  
 MCHC 29.9 (L) 31.4 - 35.0 g/dL  
 RDW 19.5 % PLATELET 964 418 - 900 K/uL MPV 9.8 9.4 - 12.3 FL ABSOLUTE NRBC 0.00 0.0 - 0.2 K/uL  
 DF AUTOMATED NEUTROPHILS 76 43 - 78 % LYMPHOCYTES 12 (L) 13 - 44 % MONOCYTES 10 4.0 - 12.0 % EOSINOPHILS 0 (L) 0.5 - 7.8 % BASOPHILS 0 0.0 - 2.0 % IMMATURE GRANULOCYTES 1 0.0 - 5.0 %  
 ABS. NEUTROPHILS 9.2 (H) 1.7 - 8.2 K/UL  
 ABS. LYMPHOCYTES 1.5 0.5 - 4.6 K/UL  
 ABS. MONOCYTES 1.2 0.1 - 1.3 K/UL  
 ABS. EOSINOPHILS 0.0 0.0 - 0.8 K/UL  
 ABS. BASOPHILS 0.0 0.0 - 0.2 K/UL  
 ABS. IMM. GRANS. 0.1 0.0 - 0.5 K/UL Xr Chest Pa Lat Result Date: 10/14/2018 CHEST X-RAY, 2 views. HISTORY:  Shortness breath and lower extremity edema. TECHNIQUE: PA and lateral views. COMPARISON: August 20. FINDINGS: There are now bilateral pleural effusions, large on the right and moderate on the left. The heart is quite enlarged. Pulmonary vasculature is unremarkable. IMPRESSION: Cardiomegaly and large pleural effusions, right more than left. Pleural effusions are new since 30 August 2018 Difficult to ascertain whether the patient's symptoms are due to  Worsening of congestive heart failure or sepsis. I believe there is component of both. Blood cultures obtained. Antibiotic to be administered. Cautious diuresis. I spoke with the hospitalist regarding admission and potential pulmonary and cardiology consultations. Patient agreeable with plan. =================================================================== 
CARDIOPULMONARY ASSESSMENT (Lactate >4 or Septic Shock) Visit Vitals  /78  Pulse (!) 117  Temp 100.2 °F (37.9 °C)  Resp 25  
 Ht 6' (1.829 m)  Wt 88.5 kg (195 lb)  SpO2 94%  BMI 26.45 kg/m2  
 
 
--Lungs - diminished breath sounds, wheezing rhonchi and rales --Heart -  Sinus Tachycardia 120 
--Capillary Refill - less than 2 seconds 
--Peripheral Pulses (Radial, DP, PT)-  2+ radial 
--Skin: Appropriate for Ethnicity 
--Skin Exam -- normal, no cyanosis, jaundice, pallor or bruising 1:29 PM, 10/14/2018, Ana María Bryan MD 
=================================================================== 
 
=================================================================== This patient is critically ill and there is a high probability of of imminent or life threatening deterioration in the patient's condition without immediate management. The nature of the patient's clinical problem is: severe sepsis, suspect a pneumonia and endocarditis I have spent 50 minutes in direct patient care, documentation, review of labs/xrays/old records, discussion with Family, Colleague . The time involved in the performance of separately reportable procedures was not counted toward critical care time.   
 
Ana María Bryan MD; 10/14/2018 @1:31 PM 
===================================================================

## 2018-10-14 NOTE — ED TRIAGE NOTES
Pt states she has been extremely SOB with chest pain and leg swelling for a few days. Pt has a hx of CHF due to drug use and it is getting much worse.

## 2018-10-14 NOTE — ROUTINE PROCESS
TRANSFER - OUT REPORT: 
 
Verbal report given to Norris RN(name) on Sarahi Ovalle  being transferred to ICU(unit) for routine progression of care Report consisted of patients Situation, Background, Assessment and  
Recommendations(SBAR). Information from the following report(s) ED Summary was reviewed with the receiving nurse. Lines:  
Peripheral IV 10/14/18 Right;Upper Arm (Active) Site Assessment Clean, dry, & intact 10/14/2018  1:07 PM  
Phlebitis Assessment 0 10/14/2018  1:07 PM  
Infiltration Assessment 0 10/14/2018  1:07 PM  
  
 
Opportunity for questions and clarification was provided. Patient transported with: 
 O2 @ 2 liters Monitor Belongings Family

## 2018-10-14 NOTE — H&P
Hospitalist Admission History and Physical  
 
NAME:  Kaela Mejia Age:  28 y.o. 
:   1985 MRN:   [id number] PCP: None Consulting MD: Treatment Team: Attending Provider: Rashad Andersen DO; Care Manager: La Silverio Chief Complaint Patient presents with  Shortness of Breath HPI:  
Patient is a 28 y.o. female who presented to the ED for a cc of SOB and LE edema. Patient has a hx significant for systolic CHF with EF 10 % diagnosed , IV drug use, non compliance with medications, and recent diagnosis of right sacroiliac septic arthritis and MSSA bacteremia/endocarditis. Patient originally treated for bacteremia/endocarditis at [facility] with nafcillin ending treatment 10/22/18. Patient used IV heroin via her PICC line while at [facility] and left AMA once they discontinued her PICC line. Once discharged, patient had no follow up and has not been taking her CHF medications until last week. Over the past few weeks, she has had increased SOB and LE edema. Admits to using IV heroin yesterday. No new skin lesions. Vitals showed tachycardia and elevated RR. 99 % 2L NC 
 
WBC 12, hg 9, Na 131, creatinine 0.86, total bili 2.6, , and AST 55. Lactic acid 2.5. Patient given 500cc NS bolus in ED. Morphine 4mg given. Chest x ray showed cardiomegaly and large pleural effusions, right more than left. Pleural effusions are new since 2018 Past Medical History:  
Diagnosis Date  Anxiety  Heart failure ([facility]  Hypertension  Methamphetamine abuse ([facility]  Neurological disorder   
 migraines  Polysubstance abuse ([facility] Past Surgical History:  
Procedure Laterality Date  HX APPENDECTOMY  HX GYN    
  History reviewed. No pertinent family history. Social History Social History Narrative Social History Substance Use Topics  Smoking status: Current Every Day Smoker   Packs/day: 1.00  
  Smokeless tobacco: Never Used  Alcohol use Yes Comment: rarely History Drug Use  Yes  Special: Heroin, Cocaine, Methamphetamines Allergies Allergen Reactions  Latex Unknown (comments)  Sulfa (Sulfonamide Antibiotics) Rash Prior to Admission medications Medication Sig Start Date End Date Taking? Authorizing Provider  
furosemide (LASIX) 40 mg tablet Take 40 mg by mouth daily. Yes Harrison Anderson MD  
lisinopril (PRINIVIL, ZESTRIL) 5 mg tablet Take 5 mg by mouth daily. Yes Harrison Anderson MD  
spironolactone (ALDACTONE) 25 mg tablet Take 25 mg by mouth daily. Yes Harrison Anderson MD  
carvedilol (COREG) 3.125 mg tablet Take  by mouth two (2) times daily (with meals). Yes Harrison Anderson MD  
methadone HCl (METHADONE PO) Take 35 mg by mouth. Harrison Anderson MD  
 
 
 
 
Review of Systems Constitutional: SOB. Eyes:  no change in visual acuity, no photophobia Ears, nose, mouth, throat, and face: no  Odynphagia, dysphagia, no thrush or exudate, negative for chronic sinus congestion, recurrent headaches Respiratory: SOB even at rest.  
Cardiovascular: negative for CP Gastrointestinal: negative for abdominal pain, no hematemesis, hematochezia or BRBPR Genitourinary: no urgency, frequency, or dysuria, no nocturia Integument/breast: negative for skin rash or skin lesions Hematologic/lymphatic: negative for known bleeding disorder Musculoskeletal:LE edema Neurological: negative for lightheadedness, syncope or presyncopal events, no seizure or CVA history Behavioral/Psych: negative for depression or chronic anxiety, Endocrine: negative for polydyspia, polyuria or intolerance to heat or cold Allergic/Immunologic: negative for chronic allergic rhinitis, or known connective tissue disorder Objective:  
 
Visit Vitals  /67  Pulse (!) 106  Temp 98.4 °F (36.9 °C)  Resp (!) 32  
 Ht 6' (1.829 m)  Wt 88.5 kg (195 lb)  SpO2 91%  Breastfeeding No  
  BMI 26.45 kg/m2 Data Review:  
Recent Results (from the past 24 hour(s)) EKG, 12 LEAD, INITIAL Collection Time: 10/14/18 12:16 PM  
Result Value Ref Range Ventricular Rate 114 BPM  
 Atrial Rate 114 BPM  
 P-R Interval 166 ms  
 QRS Duration 112 ms  
 Q-T Interval 330 ms QTC Calculation (Bezet) 454 ms Calculated P Axis 47 degrees Calculated R Axis 88 degrees Calculated T Axis -26 degrees Diagnosis    
  !! AGE AND GENDER SPECIFIC ECG ANALYSIS !! Sinus tachycardia Biatrial enlargement ST & T wave abnormality, consider inferolateral ischemia Abnormal ECG When compared with ECG of 30-AUG-2018 00:34, Fusion complexes are no longer Present T wave inversion less evident in Anterolateral leads POC TROPONIN-I Collection Time: 10/14/18 12:39 PM  
Result Value Ref Range Troponin-I (POC) 0 (L) 0.02 - 0.05 ng/ml POC LACTIC ACID Collection Time: 10/14/18 12:41 PM  
Result Value Ref Range Lactic Acid (POC) 2.5 (H) 0.5 - 1.9 mmol/L  
CBC WITH AUTOMATED DIFF Collection Time: 10/14/18 12:50 PM  
Result Value Ref Range WBC 12.0 (H) 4.3 - 11.1 K/uL  
 RBC 3.46 (L) 4.05 - 5.2 M/uL HGB 9.0 (L) 11.7 - 15.4 g/dL HCT 30.1 (L) 35.8 - 46.3 % MCV 87.0 79.6 - 97.8 FL  
 MCH 26.0 (L) 26.1 - 32.9 PG  
 MCHC 29.9 (L) 31.4 - 35.0 g/dL  
 RDW 19.5 % PLATELET 158 996 - 913 K/uL MPV 9.8 9.4 - 12.3 FL ABSOLUTE NRBC 0.00 0.0 - 0.2 K/uL  
 DF AUTOMATED NEUTROPHILS 76 43 - 78 % LYMPHOCYTES 12 (L) 13 - 44 % MONOCYTES 10 4.0 - 12.0 % EOSINOPHILS 0 (L) 0.5 - 7.8 % BASOPHILS 0 0.0 - 2.0 % IMMATURE GRANULOCYTES 1 0.0 - 5.0 %  
 ABS. NEUTROPHILS 9.2 (H) 1.7 - 8.2 K/UL  
 ABS. LYMPHOCYTES 1.5 0.5 - 4.6 K/UL  
 ABS. MONOCYTES 1.2 0.1 - 1.3 K/UL  
 ABS. EOSINOPHILS 0.0 0.0 - 0.8 K/UL  
 ABS. BASOPHILS 0.0 0.0 - 0.2 K/UL  
 ABS. IMM. GRANS. 0.1 0.0 - 0.5 K/UL METABOLIC PANEL, COMPREHENSIVE Collection Time: 10/14/18 12:50 PM  
Result Value Ref Range Sodium 131 (L) 136 - 145 mmol/L Potassium 4.1 3.5 - 5.1 mmol/L Chloride 93 (L) 98 - 107 mmol/L  
 CO2 28 21 - 32 mmol/L Anion gap 10 mmol/L Glucose 112 (H) 65 - 100 mg/dL BUN 16 6 - 23 MG/DL Creatinine 0.86 0.6 - 1.0 MG/DL  
 GFR est AA >60 >60 ml/min/1.73m2 GFR est non-AA >60 ml/min/1.73m2 Calcium 8.0 (L) 8.3 - 10.4 MG/DL Bilirubin, total 2.6 (H) 0.2 - 1.1 MG/DL  
 ALT (SGPT) 122 (H) 12 - 65 U/L  
 AST (SGOT) 55 (H) 15 - 37 U/L Alk. phosphatase 109 50 - 130 U/L Protein, total 6.9 g/dL Albumin 1.8 (L) 3.5 - 5.0 g/dL Globulin 5.1 (H) 2.3 - 3.5 g/dL A-G Ratio 0.4 BNP Collection Time: 10/14/18 12:50 PM  
Result Value Ref Range  pg/mL Physical Exam:  
 
General:  Alert, tearful during exam   
Eyes:  Conjunctivae/corneas clear. Ears:  Normal TMs and external ear canals both ears. Nose: Nares normal. Septum midline. Mouth/Throat: Lips, mucosa, and tongue normal.   
Neck:  no JVD. Back:   Symmetric, no curvature. ROM normal. Right CVA tenderness that she states is chronic. Lungs:   Limited breathe sounds at lower bases bilaterally. Heart:   distant heart sounds. Abdomen:   Soft, non-tender. Bowel sounds normal. No masses,  No organomegaly. Extremities: Multiple ecchymosis of UE, more so on the right. Small skin tear that is 1/4 X 1/8CM superficially at the right dorsal lateral aspect of left hand. 3+ edema to LE from feet to distal half of thighs bilaterally. No erythema or deformity to hips. Ambulating well. Pulses: 2+ and symmetric all extremities. Skin: As above Lymph nodes: Cervical, supraclavicular, and axillary nodes normal.  
Neurologic: CNII-XII intact. Normal strength, sensation and reflexes throughout. Assessment and Plan Principal Problem: 
  Sepsis ([address] (10/14/2018) Active Problems: 
  Heroin abuse ([address] (4/10/2018) Systolic CHF, acute on chronic ([facility] [facility] (8/30/2018) MSSA bacteremia (10/14/2018) Septic arthritis ([facility] (10/14/2018) Prolonged Q-T interval on ECG (10/14/2018) Transaminitis (10/14/2018) Hyponatremia (10/14/2018) Meets sepsis criteria. Avoid fluid bolus due to severe CHF. Received 500cc downstairs. Likely etiology of her sepsis is her incomplete treatment of MSSA bacteremia. Patient states she was treated at [facility] for a month. I cannot find admission date at [facility]. Have called pharmacy who will look into how long she was treated with Nafcillin. Patient has no right joint pain and if treated with IV antibiotics for at least a month, septic arthritis as cause of sepsis is less likely today. Consult ID. Order EKG to ensure no prolonged QT. Currently on 35mg of Methadone. She understands the potential for prolonged QT from methadone. I have offered Suboxone as [facility] has offered, but patient states she will never take Suboxone since she will have to be in withdrawal to start. For her systolic CHF, poor prognosis. Consult cardiology. Restart home medications. IV lasix 60mg. Strict Is and Os. Scheduled Duonebs. Transaminitis likely from sepsis. Trend. Hyponatremia likely from poor PO intake. Monitor. Order sitter for patient since hx of using IV drug while in stay. Order UDS and HCG today. HIGH RISK AMA 
 
DVT prophylaxis - heparin Signed By: Ramo Mitchell DO October 14, 2018

## 2018-10-15 LAB
BASOPHILS # BLD: 0 K/UL (ref 0–0.2)
BASOPHILS NFR BLD: 0 % (ref 0–2)
DIFFERENTIAL METHOD BLD: ABNORMAL
EOSINOPHIL # BLD: 0.1 K/UL (ref 0–0.8)
EOSINOPHIL NFR BLD: 1 % (ref 0.5–7.8)
ERYTHROCYTE [DISTWIDTH] IN BLOOD BY AUTOMATED COUNT: 18.8 %
HCT VFR BLD AUTO: 26.1 % (ref 35.8–46.3)
HGB BLD-MCNC: 7.9 G/DL (ref 11.7–15.4)
IMM GRANULOCYTES # BLD: 0 K/UL (ref 0–0.5)
IMM GRANULOCYTES NFR BLD AUTO: 1 % (ref 0–5)
LYMPHOCYTES # BLD: 1.9 K/UL (ref 0.5–4.6)
LYMPHOCYTES NFR BLD: 24 % (ref 13–44)
MCH RBC QN AUTO: 26.2 PG (ref 26.1–32.9)
MCHC RBC AUTO-ENTMCNC: 30.3 G/DL (ref 31.4–35)
MCV RBC AUTO: 86.4 FL (ref 79.6–97.8)
MONOCYTES # BLD: 1 K/UL (ref 0.1–1.3)
MONOCYTES NFR BLD: 13 % (ref 4–12)
NEUTS SEG # BLD: 4.8 K/UL (ref 1.7–8.2)
NEUTS SEG NFR BLD: 61 % (ref 43–78)
NRBC # BLD: 0 K/UL (ref 0–0.2)
PLATELET # BLD AUTO: 235 K/UL (ref 150–450)
PMV BLD AUTO: 9 FL (ref 9.4–12.3)
RBC # BLD AUTO: 3.02 M/UL (ref 4.05–5.2)
WBC # BLD AUTO: 7.8 K/UL (ref 4.3–11.1)

## 2018-10-15 PROCEDURE — 74011250637 HC RX REV CODE- 250/637: Performed by: FAMILY MEDICINE

## 2018-10-15 PROCEDURE — 74011250636 HC RX REV CODE- 250/636: Performed by: FAMILY MEDICINE

## 2018-10-15 PROCEDURE — 77010033678 HC OXYGEN DAILY

## 2018-10-15 PROCEDURE — C1751 CATH, INF, PER/CENT/MIDLINE: HCPCS

## 2018-10-15 PROCEDURE — 80053 COMPREHEN METABOLIC PANEL: CPT

## 2018-10-15 PROCEDURE — 65610000001 HC ROOM ICU GENERAL

## 2018-10-15 PROCEDURE — 87040 BLOOD CULTURE FOR BACTERIA: CPT

## 2018-10-15 PROCEDURE — 94640 AIRWAY INHALATION TREATMENT: CPT

## 2018-10-15 PROCEDURE — 77030018786 HC NDL GD F/USND BARD -B

## 2018-10-15 PROCEDURE — 85025 COMPLETE CBC W/AUTO DIFF WBC: CPT

## 2018-10-15 PROCEDURE — 74011000250 HC RX REV CODE- 250: Performed by: FAMILY MEDICINE

## 2018-10-15 PROCEDURE — 76937 US GUIDE VASCULAR ACCESS: CPT

## 2018-10-15 PROCEDURE — 94760 N-INVAS EAR/PLS OXIMETRY 1: CPT

## 2018-10-15 PROCEDURE — 36415 COLL VENOUS BLD VENIPUNCTURE: CPT

## 2018-10-15 RX ORDER — ALBUTEROL SULFATE 0.83 MG/ML
2.5 SOLUTION RESPIRATORY (INHALATION)
Status: DISCONTINUED | OUTPATIENT
Start: 2018-10-15 | End: 2018-10-16

## 2018-10-15 RX ORDER — SODIUM CHLORIDE 0.9 % (FLUSH) 0.9 %
10 SYRINGE (ML) INJECTION AS NEEDED
Status: DISCONTINUED | OUTPATIENT
Start: 2018-10-15 | End: 2018-10-25 | Stop reason: HOSPADM

## 2018-10-15 RX ORDER — SODIUM CHLORIDE 0.9 % (FLUSH) 0.9 %
10 SYRINGE (ML) INJECTION EVERY 8 HOURS
Status: DISCONTINUED | OUTPATIENT
Start: 2018-10-15 | End: 2018-10-25 | Stop reason: HOSPADM

## 2018-10-15 RX ADMIN — Medication 10 ML: at 13:23

## 2018-10-15 RX ADMIN — IPRATROPIUM BROMIDE AND ALBUTEROL SULFATE 3 ML: .5; 3 SOLUTION RESPIRATORY (INHALATION) at 15:05

## 2018-10-15 RX ADMIN — CARVEDILOL 3.12 MG: 3.12 TABLET, FILM COATED ORAL at 16:24

## 2018-10-15 RX ADMIN — ALBUTEROL SULFATE 2.5 MG: 2.5 SOLUTION RESPIRATORY (INHALATION) at 21:18

## 2018-10-15 RX ADMIN — LISINOPRIL 5 MG: 5 TABLET ORAL at 09:03

## 2018-10-15 RX ADMIN — ACETAMINOPHEN 650 MG: 325 TABLET, FILM COATED ORAL at 16:24

## 2018-10-15 RX ADMIN — IPRATROPIUM BROMIDE AND ALBUTEROL SULFATE 3 ML: .5; 3 SOLUTION RESPIRATORY (INHALATION) at 04:39

## 2018-10-15 RX ADMIN — METHADONE HYDROCHLORIDE 35 MG: 10 TABLET ORAL at 10:16

## 2018-10-15 RX ADMIN — IPRATROPIUM BROMIDE AND ALBUTEROL SULFATE 3 ML: .5; 3 SOLUTION RESPIRATORY (INHALATION) at 11:44

## 2018-10-15 RX ADMIN — Medication 10 ML: at 21:16

## 2018-10-15 RX ADMIN — CARVEDILOL 3.12 MG: 3.12 TABLET, FILM COATED ORAL at 09:03

## 2018-10-15 RX ADMIN — HEPARIN SODIUM 5000 UNITS: 5000 INJECTION INTRAVENOUS; SUBCUTANEOUS at 16:24

## 2018-10-15 RX ADMIN — HEPARIN SODIUM 5000 UNITS: 5000 INJECTION INTRAVENOUS; SUBCUTANEOUS at 05:58

## 2018-10-15 RX ADMIN — FUROSEMIDE 60 MG: 40 TABLET ORAL at 21:16

## 2018-10-15 RX ADMIN — HEPARIN SODIUM 5000 UNITS: 5000 INJECTION INTRAVENOUS; SUBCUTANEOUS at 22:20

## 2018-10-15 RX ADMIN — SPIRONOLACTONE 25 MG: 25 TABLET ORAL at 09:03

## 2018-10-15 RX ADMIN — IPRATROPIUM BROMIDE AND ALBUTEROL SULFATE 3 ML: .5; 3 SOLUTION RESPIRATORY (INHALATION) at 07:37

## 2018-10-15 RX ADMIN — Medication 10 ML: at 05:58

## 2018-10-15 NOTE — PROGRESS NOTES
Call back received from PICC team, states pt is not a candidate for a PICC d/t using her last PICC at [facility] for heroin injection. State they will assess the pt for a midline later today. Dr. Cooper Moreno notified of this and pt's refusal of am IV meds including abx and lasix. No new orders received at this time.

## 2018-10-15 NOTE — PROGRESS NOTES
Progress Note Patient: Bertha Mcfadden MRN: [id number]  SSN: [id number] YOB: 1985  Age: 28 y.o. Sex: female Admit Date: 10/14/2018 LOS: 1 day Subjective: F/U sepsis from MSSA bacteremia Cardiology and ID pending to see. -6L output. Patient stated less SOB today. Difficulty with peripheral access and PICC team pending to see patient. No pain. Satting well 2L NC. Current Facility-Administered Medications Medication Dose Route Frequency  albuterol (PROVENTIL VENTOLIN) nebulizer solution 2.5 mg  2.5 mg Nebulization Q6H RT  
 sodium chloride (NS) flush 5-10 mL  5-10 mL IntraVENous Q8H  
 sodium chloride (NS) flush 5-10 mL  5-10 mL IntraVENous PRN  
 acetaminophen (TYLENOL) tablet 650 mg  650 mg Oral Q4H PRN  
 heparin (porcine) injection 5,000 Units  5,000 Units SubCUTAneous Q8H  
 carvedilol (COREG) tablet 3.125 mg  3.125 mg Oral BID WITH MEALS  lisinopril (PRINIVIL, ZESTRIL) tablet 5 mg  5 mg Oral DAILY  spironolactone (ALDACTONE) tablet 25 mg  25 mg Oral DAILY  methadone (DOLOPHINE) tablet 35 mg  35 mg Oral DAILY  ceFAZolin (ANCEF) 2 g/20 mL in sterile water IV syringe  2 g IntraVENous Q8H  
 furosemide (LASIX) injection 60 mg  60 mg IntraVENous DAILY Objective:  
 
Vitals:  
 10/15/18 1801 10/15/18 1822 10/15/18 1831 10/15/18 1835 BP: 114/79  106/72 Pulse: (!) 113   (!) 112 Resp: 15   24 Temp:  98.9 °F (37.2 °C) SpO2: 96%  96% 94% Weight:      
Height:      
  
  
Intake and Output: 
Current Shift:   
Last three shifts: 10/14 0701 - 10/15 1900 In: 500 [P.O.:480; I.V.:20] Out: 3700 [HEVTB:2136] Physical Exam:  
General:  Alert, cooperative, no distress. Eyes:  Conjunctivae/corneas clear. Ears:  Normal TMs and external ear canals both ears. Nose: Nares normal. Septum midline. Mouth/Throat: Lips, mucosa, and tongue normal. Teeth and gums normal.  
Neck:  no JVD. Back:   Symmetric, no curvature. ROM normal. No CVA tenderness. Lungs:   Limited breathe sounds throughout Heart:  Distant heart sounds Abdomen:   Soft, non-tender. Bowel sounds normal. No masses,  No organomegaly. Extremities: 3+ edema to feet to knees bilaterally Pulses: 2+ and symmetric all extremities. Skin: Skin color, texture, turgor normal. No rashes or lesions Lymph nodes: Cervical, supraclavicular, and axillary nodes normal.  
Neurologic: CNII-XII intact. Normal strength, sensation and reflexes throughout. Lab/Data Review: 
 
Recent Results (from the past 24 hour(s)) DRUG SCREEN, URINE Collection Time: 10/14/18  9:03 PM  
Result Value Ref Range PCP(PHENCYCLIDINE) NEGATIVE BENZODIAZEPINES NEGATIVE     
 COCAINE POSITIVE AMPHETAMINES NEGATIVE METHADONE POSITIVE    
 THC (TH-CANNABINOL) NEGATIVE     
 OPIATES POSITIVE    
 BARBITURATES NEGATIVE Assessment/ Plan:  
 
Principal Problem: 
  Sepsis ([address] (10/14/2018) Active Problems: 
  Heroin abuse ([address] (4/10/2018) Systolic CHF, acute on chronic ([address] (8/30/2018) MSSA bacteremia (10/14/2018) Septic arthritis ([address] (10/14/2018) Prolonged Q-T interval on ECG (10/14/2018) Transaminitis (10/14/2018) Hyponatremia (10/14/2018) Likely etiology of her sepsis is her incomplete treatment of MSSA bacteremia. ID to see hopefully tomorrow. Continue Ancef. Blood cultures negative to date. Of note, later today patient did run fever. Due to no peripheral access repeat blood cultures cannot be drawn. Will attempt IV access with PICC team who are coming later tonight.  
  
Currently on 35mg of Methadone. She understands the potential for prolonged QT from methadone. I have offered Suboxone as [facility] has offered, but patient states she will never take Suboxone since she will have to be in withdrawal to start.  
  
For her systolic CHF, poor prognosis.  Consulted cardiology should see tomorrow. Restart home medications. IV lasix 60mg. Strict Is and Os. Scheduled Duonebs. Of note, patient became irritated earlier this AM when discussing a sitter since she has injected heroin in her PICC line at [facility]. Since this discussion, patient has been refusing some of her medications even though without treatment she is aware of potential side effects such as worsened prognosis and even death.  
  
Transaminitis likely from sepsis. Trend.  
  
Hyponatremia likely from poor PO intake. Monitor.  
  
HCG negative UDS positive for cocaine, methadone, and opiates 
  
HIGH RISK AMA 
  
DVT prophylaxis - heparin Signed By: Rashad Andersen DO October 15, 2018

## 2018-10-15 NOTE — PROGRESS NOTES
Problem: Falls - Risk of 
Goal: *Absence of Falls Document Srikanth Evens Fall Risk and appropriate interventions in the flowsheet. Outcome: Progressing Towards Goal 
Fall Risk Interventions: 
Mobility Interventions: Bed/chair exit alarm, Communicate number of staff needed for ambulation/transfer, Patient to call before getting OOB, Strengthening exercises (ROM-active/passive) Medication Interventions: Bed/chair exit alarm, Evaluate medications/consider consulting pharmacy, Patient to call before getting OOB

## 2018-10-15 NOTE — PROGRESS NOTES
Interdisciplinary team rounds were held 10/15/2018 with the following team members:Care Management, Nursing, Physical Therapy, Physician and Nursing Supervisor and the patient. Plan of care discussed. See clinical pathway and/or care plan for interventions and desired outcomes.

## 2018-10-15 NOTE — PROGRESS NOTES
Problem: Nutrition Deficit Goal: *Optimize nutritional status Nutrition Reason for assessment: BPA - Malnutrition Screening Tool positive for unintended weight loss. Assessment:  
Admitted with sepsis, heroin abuse, systolic CHF, MSSA bacteremia. PMH: CHF, IV drug abuse, R sacroiliac arthritis, MSSA/endocarditis. 2+pitting edema geoffrey LE. Midline planned for today. Food/Nutrition Patient History:  Pt sitting up in bed playing games on her mobile device at my visit. 32 ounce gatorade noted on floor by chair >1/3 consumed. Pt reports hx of \"baby wt loss,\" states her son is 3years old, she breast fed until 13 months and started losing wt after breast feeding cessation. Points to her legs and states I'm sure I weigh much less than current wt secondary to fluid. Complains of loss of appetite and feeling like she has to force herself to eat which she associates with CHF. Recalls eating 100% of am meal which included an omelet, turkey and yogurt. C/o thirst and wanting something to drink, reports gatorade belongs to her boyfriend, who isn't present. Recalls at baseline eats similar amount of food as am but frequently will eat only once per day. Limited historian regarding usual intake and doesn't provide quantifiable information. Anticipate likely compromised with hx of IV drug abuse. Diet order(s): Cardiac, Glucerna TID. Pertinent Labs: Na 131, Gluc 108-113Anthropometrics:Height: 6' (182.9 cm),  Weight: 88.5 kg (195 lb), unspecified, Body mass index is 26.45 kg/(m^2). BMI class of overweight. Limited validity of BMI secondary significant edema. Wt hx at cardiology visits range from 205-213# from Nov - April which does not correlate with time frame reported by pt of wt loss associated with child birth. Pt potentially with 9% wt loss over 5 months. Value likely greater once corrected for edema. NFPE: Mild clavicle muscle loss. Malnutrition Criteria: ASPEN Malnutrition Criteria Acute Illness, Chronic Illness, or Social/Enviornmental: Social/environmental illness Weight Loss: 7.5% x 3 month Muscle Mass: Mild Fluid Accumulation: Severe ASPEN Malnutrition Score - Social/Environmental Illness: 8 Social/Environmental Illness - Malnutrition Diagnosis: Moderate malnutrition Macronutrient needs: Wt used: 73kg EER:  1894-3008 kcal /day (25-30 kcal/kg ideal BW) EPR:  73-88 grams protein/day (1-1.2 grams/kg ideal BW) Intake/Comparative Standards: Limited data to fully assess comparative standards. Nutrition Diagnosis: Predicted suboptimal energy intake related to hx of wt loss, muscle loss as evidenced by 18# wt loss, mild clavicle loss and pt reports hx of fluctuating po intake. Intervention: 
Meals and snacks: Continue current diet. Nutrition supplement therapy: Change supplement to Glucerna once daily. Coordination of nutrition care: July Wang RN. Discharge Nutrition Plan: Too soon to determine. [place], [address] [place] [address], [place] [address]

## 2018-10-15 NOTE — PROGRESS NOTES
[facility], house supervisor, aware of need for pt sitter per orders. Sharmila Laboy (contact for sitters) state that they do not have any available for dayshift but will try to work on finding one for nightshift. She has been in contact with [facility].

## 2018-10-15 NOTE — PROGRESS NOTES
[employer] representative contacted by CINDY here to see the patient and provide support regarding pt's drug use/addiction. Pt continues to refuse for labs to be drawn stating \"I'm getting a PICC\". Awaiting PICC teams arrival to assess her for possible placement.

## 2018-10-15 NOTE — PROGRESS NOTES
Chart reviewed - will see tomorrow AM - already on ancef for MSSA endocarditis - left AMA from [facility]

## 2018-10-15 NOTE — PROGRESS NOTES
Spoke with [facility] Cardiology answering service. Confirmed by  that consult was called in yesterday at 2:51 pm to see the patient today. Cardiology has not yet been by.  to send out consult again and if not contacted by MD within 1 hour will call back answering service. Spoke with Dr. Jamial Carvajal, states that they will see the pt tomorrow. Dr. Altagracia Sanford notified of this. Also notified pt's temp 101.3 today and orders received for repeat blood cultures. Dr. Altagracia Sanford aware PICC team to place midline today.

## 2018-10-15 NOTE — PROGRESS NOTES
Cm visited pt due to self-pay status. Patient states she has applied for medicaid, but have not got a response back. Pt is a IV drug user and is requesting a PICC so she can get her meds. Pt is also concern about why she is in an ICU room when she should be in a regular room, she concern about the medical bill to be more expensive in ICU when she does not need to be in ICU. Cm ask patient if she have a PCP and patient stated no and do not need one b/c she and her boyfriend are moving to [place] where her family lives, so she can have some sort of support system. Cm confirm address, but patient state she is currently living at a motel in [place], but the address is correct to receive mail. Cm contact KASH Subramanian) to come and speak with patient and cm contact Doug to see if patient qualifies for any resources through [facility].  
 
DCR

## 2018-10-15 NOTE — PROGRESS NOTES
Pt refusing IV Ancef and IV Lasix at this time. States that her IV \"hurts way too bad\". IV flushed with saline, no drainage, redness, or infiltration noted. Notified pt of importance of these medications and pt verbalized understanding but states she \"just can't do it right now\". Notified pt that I do not have a specific time frame for PICC placement today yet. Pt tearful and states \"I'm just so tired of dealing with this\". Pt attached to monitor, FERDINAND Bustillos, at bedside. 09:32 - Message left for PICC team regarding need for placement today and approximated time frame. Awaiting return call.

## 2018-10-16 ENCOUNTER — APPOINTMENT (OUTPATIENT)
Dept: GENERAL RADIOLOGY | Age: 33
DRG: 871 | End: 2018-10-16
Attending: INTERNAL MEDICINE
Payer: SUBSIDIZED

## 2018-10-16 PROBLEM — E87.6 HYPOKALEMIA: Status: ACTIVE | Noted: 2018-10-16

## 2018-10-16 PROBLEM — E83.42 HYPOMAGNESEMIA: Status: ACTIVE | Noted: 2018-10-16

## 2018-10-16 PROBLEM — J90 PLEURAL EFFUSION: Status: ACTIVE | Noted: 2018-10-16

## 2018-10-16 LAB
ABO + RH BLD: NORMAL
ALBUMIN SERPL-MCNC: 1.7 G/DL (ref 3.5–5)
ALBUMIN SERPL-MCNC: 1.8 G/DL (ref 3.5–5)
ALBUMIN/GLOB SERPL: 0.4 {RATIO}
ALBUMIN/GLOB SERPL: 0.4 {RATIO}
ALP SERPL-CCNC: 99 U/L (ref 50–136)
ALP SERPL-CCNC: 99 U/L (ref 50–136)
ALT SERPL-CCNC: 64 U/L (ref 12–65)
ALT SERPL-CCNC: 68 U/L (ref 12–65)
ANION GAP SERPL CALC-SCNC: 7 MMOL/L
ANION GAP SERPL CALC-SCNC: 8 MMOL/L
APPEARANCE FLD: NORMAL
AST SERPL-CCNC: 29 U/L (ref 15–37)
AST SERPL-CCNC: 30 U/L (ref 15–37)
BASOPHILS # BLD: 0 K/UL (ref 0–0.2)
BASOPHILS NFR BLD: 0 % (ref 0–2)
BILIRUB SERPL-MCNC: 1.8 MG/DL (ref 0.2–1.1)
BILIRUB SERPL-MCNC: 2.2 MG/DL (ref 0.2–1.1)
BLOOD BANK CMNT PATIENT-IMP: NORMAL
BLOOD GROUP ANTIBODIES SERPL: NORMAL
BLOOD GROUP ANTIBODIES SERPL: NORMAL
BUN SERPL-MCNC: 13 MG/DL (ref 6–23)
BUN SERPL-MCNC: 14 MG/DL (ref 6–23)
CALCIUM SERPL-MCNC: 7.5 MG/DL (ref 8.3–10.4)
CALCIUM SERPL-MCNC: 8.1 MG/DL (ref 8.3–10.4)
CHLORIDE SERPL-SCNC: 91 MMOL/L (ref 98–107)
CHLORIDE SERPL-SCNC: 92 MMOL/L (ref 98–107)
CO2 SERPL-SCNC: 32 MMOL/L (ref 21–32)
CO2 SERPL-SCNC: 32 MMOL/L (ref 21–32)
COLOR FLD: NORMAL
CREAT SERPL-MCNC: 0.95 MG/DL (ref 0.6–1)
CREAT SERPL-MCNC: 0.96 MG/DL (ref 0.6–1)
DIFFERENTIAL METHOD BLD: ABNORMAL
EOSINOPHIL # BLD: 0.1 K/UL (ref 0–0.8)
EOSINOPHIL NFR BLD: 1 % (ref 0.5–7.8)
ERYTHROCYTE [DISTWIDTH] IN BLOOD BY AUTOMATED COUNT: 18.6 %
GLOBULIN SER CALC-MCNC: 4 G/DL (ref 2.3–3.5)
GLOBULIN SER CALC-MCNC: 4.8 G/DL (ref 2.3–3.5)
GLUCOSE FLD-MCNC: 61 MG/DL
GLUCOSE SERPL-MCNC: 102 MG/DL (ref 65–100)
GLUCOSE SERPL-MCNC: 95 MG/DL (ref 65–100)
HCT VFR BLD AUTO: 27.9 % (ref 35.8–46.3)
HGB BLD-MCNC: 8.3 G/DL (ref 11.7–15.4)
IMM GRANULOCYTES # BLD: 0 K/UL (ref 0–0.5)
IMM GRANULOCYTES NFR BLD AUTO: 1 % (ref 0–5)
LDH FLD L TO P-CCNC: 732 U/L
LYMPHOCYTES # BLD: 1.4 K/UL (ref 0.5–4.6)
LYMPHOCYTES NFR BLD: 16 % (ref 13–44)
LYMPHOCYTES NFR FLD: 23 %
MAGNESIUM SERPL-MCNC: 1.2 MG/DL (ref 1.8–2.4)
MCH RBC QN AUTO: 25.8 PG (ref 26.1–32.9)
MCHC RBC AUTO-ENTMCNC: 29.7 G/DL (ref 31.4–35)
MCV RBC AUTO: 86.6 FL (ref 79.6–97.8)
MONOCYTES # BLD: 0.9 K/UL (ref 0.1–1.3)
MONOCYTES NFR BLD: 10 % (ref 4–12)
MONOS+MACROS NFR FLD: 2 %
NEUTROPHILS NFR FLD: 75 %
NEUTS SEG # BLD: 6.3 K/UL (ref 1.7–8.2)
NEUTS SEG NFR BLD: 72 % (ref 43–78)
NRBC # BLD: 0 K/UL (ref 0–0.2)
NUC CELL # FLD: 1902 /CU MM
PLATELET # BLD AUTO: 252 K/UL (ref 150–450)
PMV BLD AUTO: 8.9 FL (ref 9.4–12.3)
POTASSIUM SERPL-SCNC: 2.8 MMOL/L (ref 3.5–5.1)
POTASSIUM SERPL-SCNC: 3.1 MMOL/L (ref 3.5–5.1)
PROT FLD-MCNC: 3.5 G/DL
PROT SERPL-MCNC: 5.7 G/DL
PROT SERPL-MCNC: 6.6 G/DL
RBC # BLD AUTO: 3.22 M/UL (ref 4.05–5.2)
RBC # FLD: NORMAL /CU MM
SODIUM SERPL-SCNC: 131 MMOL/L (ref 136–145)
SODIUM SERPL-SCNC: 131 MMOL/L (ref 136–145)
SPECIMEN EXP DATE BLD: NORMAL
SPECIMEN SOURCE FLD: NORMAL
WBC # BLD AUTO: 8.7 K/UL (ref 4.3–11.1)
WEAK D AG RBC QL: NORMAL

## 2018-10-16 PROCEDURE — 85025 COMPLETE CBC W/AUTO DIFF WBC: CPT

## 2018-10-16 PROCEDURE — 86901 BLOOD TYPING SEROLOGIC RH(D): CPT

## 2018-10-16 PROCEDURE — 87070 CULTURE OTHR SPECIMN AEROBIC: CPT

## 2018-10-16 PROCEDURE — 94640 AIRWAY INHALATION TREATMENT: CPT

## 2018-10-16 PROCEDURE — 82945 GLUCOSE OTHER FLUID: CPT

## 2018-10-16 PROCEDURE — 0W993ZZ DRAINAGE OF RIGHT PLEURAL CAVITY, PERCUTANEOUS APPROACH: ICD-10-PCS | Performed by: INTERNAL MEDICINE

## 2018-10-16 PROCEDURE — 74011250637 HC RX REV CODE- 250/637: Performed by: HOSPITALIST

## 2018-10-16 PROCEDURE — 74011250636 HC RX REV CODE- 250/636: Performed by: FAMILY MEDICINE

## 2018-10-16 PROCEDURE — 74011250636 HC RX REV CODE- 250/636: Performed by: HOSPITALIST

## 2018-10-16 PROCEDURE — 86870 RBC ANTIBODY IDENTIFICATION: CPT

## 2018-10-16 PROCEDURE — 83735 ASSAY OF MAGNESIUM: CPT

## 2018-10-16 PROCEDURE — 71045 X-RAY EXAM CHEST 1 VIEW: CPT

## 2018-10-16 PROCEDURE — 74011250637 HC RX REV CODE- 250/637: Performed by: FAMILY MEDICINE

## 2018-10-16 PROCEDURE — 77010033678 HC OXYGEN DAILY

## 2018-10-16 PROCEDURE — 74011000250 HC RX REV CODE- 250: Performed by: FAMILY MEDICINE

## 2018-10-16 PROCEDURE — 87206 SMEAR FLUORESCENT/ACID STAI: CPT

## 2018-10-16 PROCEDURE — 89050 BODY FLUID CELL COUNT: CPT

## 2018-10-16 PROCEDURE — 65270000029 HC RM PRIVATE

## 2018-10-16 PROCEDURE — 88305 TISSUE EXAM BY PATHOLOGIST: CPT

## 2018-10-16 PROCEDURE — 88112 CYTOPATH CELL ENHANCE TECH: CPT

## 2018-10-16 PROCEDURE — 99223 1ST HOSP IP/OBS HIGH 75: CPT | Performed by: INTERNAL MEDICINE

## 2018-10-16 PROCEDURE — 32555 ASPIRATE PLEURA W/ IMAGING: CPT | Performed by: INTERNAL MEDICINE

## 2018-10-16 PROCEDURE — 74011250636 HC RX REV CODE- 250/636: Performed by: INTERNAL MEDICINE

## 2018-10-16 PROCEDURE — 83615 LACTATE (LD) (LDH) ENZYME: CPT

## 2018-10-16 PROCEDURE — 80053 COMPREHEN METABOLIC PANEL: CPT

## 2018-10-16 PROCEDURE — 84157 ASSAY OF PROTEIN OTHER: CPT

## 2018-10-16 PROCEDURE — 36415 COLL VENOUS BLD VENIPUNCTURE: CPT

## 2018-10-16 RX ORDER — LANOLIN ALCOHOL/MO/W.PET/CERES
400 CREAM (GRAM) TOPICAL 3 TIMES DAILY
Status: DISCONTINUED | OUTPATIENT
Start: 2018-10-16 | End: 2018-10-25 | Stop reason: HOSPADM

## 2018-10-16 RX ORDER — MAGNESIUM SULFATE HEPTAHYDRATE 40 MG/ML
4 INJECTION, SOLUTION INTRAVENOUS ONCE
Status: COMPLETED | OUTPATIENT
Start: 2018-10-16 | End: 2018-10-16

## 2018-10-16 RX ORDER — FUROSEMIDE 10 MG/ML
40 INJECTION INTRAMUSCULAR; INTRAVENOUS 2 TIMES DAILY
Status: DISCONTINUED | OUTPATIENT
Start: 2018-10-16 | End: 2018-10-18

## 2018-10-16 RX ORDER — LISINOPRIL 5 MG/1
2.5 TABLET ORAL DAILY
Status: DISCONTINUED | OUTPATIENT
Start: 2018-10-16 | End: 2018-10-25 | Stop reason: HOSPADM

## 2018-10-16 RX ORDER — ADHESIVE BANDAGE
30 BANDAGE TOPICAL DAILY PRN
Status: DISCONTINUED | OUTPATIENT
Start: 2018-10-16 | End: 2018-10-25 | Stop reason: HOSPADM

## 2018-10-16 RX ORDER — POLYETHYLENE GLYCOL 3350 17 G/17G
17 POWDER, FOR SOLUTION ORAL DAILY
Status: DISCONTINUED | OUTPATIENT
Start: 2018-10-17 | End: 2018-10-25 | Stop reason: HOSPADM

## 2018-10-16 RX ORDER — POTASSIUM CHLORIDE 14.9 MG/ML
20 INJECTION INTRAVENOUS
Status: COMPLETED | OUTPATIENT
Start: 2018-10-16 | End: 2018-10-16

## 2018-10-16 RX ORDER — IPRATROPIUM BROMIDE AND ALBUTEROL SULFATE 2.5; .5 MG/3ML; MG/3ML
3 SOLUTION RESPIRATORY (INHALATION)
Status: DISCONTINUED | OUTPATIENT
Start: 2018-10-16 | End: 2018-10-17

## 2018-10-16 RX ORDER — POTASSIUM CHLORIDE 20 MEQ/1
20 TABLET, EXTENDED RELEASE ORAL 2 TIMES DAILY
Status: DISCONTINUED | OUTPATIENT
Start: 2018-10-16 | End: 2018-10-18

## 2018-10-16 RX ORDER — ENOXAPARIN SODIUM 100 MG/ML
40 INJECTION SUBCUTANEOUS EVERY 24 HOURS
Status: DISCONTINUED | OUTPATIENT
Start: 2018-10-16 | End: 2018-10-24

## 2018-10-16 RX ORDER — AMOXICILLIN 250 MG
2 CAPSULE ORAL
Status: DISCONTINUED | OUTPATIENT
Start: 2018-10-16 | End: 2018-10-25 | Stop reason: HOSPADM

## 2018-10-16 RX ORDER — LANOLIN ALCOHOL/MO/W.PET/CERES
1 CREAM (GRAM) TOPICAL
Status: DISCONTINUED | OUTPATIENT
Start: 2018-10-16 | End: 2018-10-25 | Stop reason: HOSPADM

## 2018-10-16 RX ADMIN — Medication 400 MG: at 21:31

## 2018-10-16 RX ADMIN — POTASSIUM CHLORIDE 20 MEQ: 14.9 INJECTION, SOLUTION INTRAVENOUS at 06:12

## 2018-10-16 RX ADMIN — POTASSIUM CHLORIDE 20 MEQ: 14.9 INJECTION, SOLUTION INTRAVENOUS at 02:57

## 2018-10-16 RX ADMIN — HEPARIN SODIUM 5000 UNITS: 5000 INJECTION INTRAVENOUS; SUBCUTANEOUS at 06:11

## 2018-10-16 RX ADMIN — POTASSIUM CHLORIDE 20 MEQ: 20 TABLET, EXTENDED RELEASE ORAL at 18:21

## 2018-10-16 RX ADMIN — FERROUS SULFATE TAB 325 MG (65 MG ELEMENTAL FE) 325 MG: 325 (65 FE) TAB at 16:57

## 2018-10-16 RX ADMIN — Medication 10 ML: at 06:12

## 2018-10-16 RX ADMIN — SPIRONOLACTONE 25 MG: 25 TABLET ORAL at 09:26

## 2018-10-16 RX ADMIN — Medication 2 G: at 16:57

## 2018-10-16 RX ADMIN — FUROSEMIDE 40 MG: 10 INJECTION, SOLUTION INTRAMUSCULAR; INTRAVENOUS at 09:26

## 2018-10-16 RX ADMIN — CARVEDILOL 3.12 MG: 3.12 TABLET, FILM COATED ORAL at 08:17

## 2018-10-16 RX ADMIN — ENOXAPARIN SODIUM 40 MG: 40 INJECTION, SOLUTION INTRAVENOUS; SUBCUTANEOUS at 09:27

## 2018-10-16 RX ADMIN — Medication 10 ML: at 21:31

## 2018-10-16 RX ADMIN — MAGNESIUM SULFATE IN WATER 4 G: 40 INJECTION, SOLUTION INTRAVENOUS at 08:17

## 2018-10-16 RX ADMIN — POTASSIUM CHLORIDE 20 MEQ: 14.9 INJECTION, SOLUTION INTRAVENOUS at 00:30

## 2018-10-16 RX ADMIN — Medication 2 G: at 08:17

## 2018-10-16 RX ADMIN — FUROSEMIDE 40 MG: 10 INJECTION, SOLUTION INTRAMUSCULAR; INTRAVENOUS at 18:21

## 2018-10-16 RX ADMIN — Medication 400 MG: at 16:57

## 2018-10-16 RX ADMIN — LISINOPRIL 2.5 MG: 5 TABLET ORAL at 09:26

## 2018-10-16 RX ADMIN — ALBUTEROL SULFATE 2.5 MG: 2.5 SOLUTION RESPIRATORY (INHALATION) at 14:07

## 2018-10-16 RX ADMIN — METHADONE HYDROCHLORIDE 35 MG: 10 TABLET ORAL at 09:27

## 2018-10-16 RX ADMIN — POTASSIUM CHLORIDE 20 MEQ: 14.9 INJECTION, SOLUTION INTRAVENOUS at 07:00

## 2018-10-16 RX ADMIN — Medication 2 G: at 00:02

## 2018-10-16 RX ADMIN — ALBUTEROL SULFATE 2.5 MG: 2.5 SOLUTION RESPIRATORY (INHALATION) at 08:28

## 2018-10-16 RX ADMIN — SENNOSIDES AND DOCUSATE SODIUM 2 TABLET: 8.6; 5 TABLET ORAL at 21:31

## 2018-10-16 NOTE — PROGRESS NOTES
Problem: Sepsis: Day 2 Goal: Treatments/Interventions/Procedures Outcome: Progressing Towards Goal 
Lactic acid blood levels trending down. Afebrile. In sinus tach.

## 2018-10-16 NOTE — CONSULTS
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-sided endocarditis with septic pulmonary emboli, s/p incomplete treatment (left AMA after 2 1/2 weeks of treatment)  · R sacroiliac septic arthritis  · IVDU: heroin, also uses cocaine; on methadone    Plan:   ·  continue ancef - will complete 6 weeks as planned prior assuming blood cx here are negative  · F/u blood cx, thoracentesis cultures  · Pt needs to see her  10/25 - planning to move to [facility] to be with her mom and get help with her addiction - may need dc and then re-admit at that time  · Cont diuresis- vastly volume overloaded    Anti-infectives:   · cefazolin    Subjective:   Date of Consultation:  October 16, 2018  Referring Physician: Carine Dodge    Patient is a 28 y.o. female who is well-known to ID from admission at [facility] in September 2018. She has an underlying medical history that includes active IVDU and Hep C, and was being treated at [facility] for MSSA bacteremia with presumed R-sided endocarditis with septic pulmonary emboli. She was on methadone, but did not want to discontinue, although she had prolonged QTc and severe congestive heart failure. No surgical intervention was planned. She had been treated with nafcillin and her antibiotics were changed to cefazolin due to worsening edema, shortness of breath and elevated creatinine. Her initial EOT was 10/22/18, but relapsed and used heroin while inpatient, and ultimately decided to leave [place] on 9/28/18. On 10/14/18 she presented to the ED at [address] [place] [address] with worsening shortness of breath, chest discomfort and subjective fevers. Blood cultures were drawn and are pending.  She had a thoracentesis today with results pending, due to pleural effusions seen on CXR (full radiology impression below). She has been started on cefazolin and has continued the methadone. QTc is 454 on EKG 10/14/18. She spike a fever 101.3 10/15/18. She feels much better after the thoracentesis. Her legs, massively swollen still but are getting better and overall she feels ok. Patient Active Problem List   Diagnosis Code    Heroin abuse ([facility] F11.10    UTI (urinary tract infection) N39.0    Dilated cardiomyopathy ([facility] K14.2    Systolic CHF, acute on chronic (HCC) I50.23    Sinus tachycardia R00.0    Leukocytosis D72.829    Sepsis ([facility] A41.9    MSSA bacteremia R78.81    Septic arthritis ([facility] M00.9    Prolonged Q-T interval on ECG R94.31    Transaminitis R74.0    Hyponatremia E87.1    Pleural effusion J90    Hypomagnesemia E83.42    Hypokalemia E87.6     Past Medical History:   Diagnosis Date    Anxiety     Heart failure ([facility]     Hypertension     Methamphetamine abuse ([facility]     Neurological disorder     migraines     Polysubstance abuse ([facility]       History reviewed. No pertinent family history. Social History   Substance Use Topics    Smoking status: Current Every Day Smoker     Packs/day: 1.00    Smokeless tobacco: Never Used    Alcohol use Yes      Comment: rarely     Past Surgical History:   Procedure Laterality Date    HX APPENDECTOMY      HX GYN            Prior to Admission medications    Medication Sig Start Date End Date Taking? Authorizing Provider   furosemide (LASIX) 40 mg tablet Take 40 mg by mouth daily. Yes Harrison Anderson MD   lisinopril (PRINIVIL, ZESTRIL) 5 mg tablet Take 5 mg by mouth daily. Yes Harrison Anderson MD   spironolactone (ALDACTONE) 25 mg tablet Take 25 mg by mouth daily. Yes Harrison Anderson MD   carvedilol (COREG) 3.125 mg tablet Take  by mouth two (2) times daily (with meals). Yes Harrison Anderson MD   methadone HCl (METHADONE PO) Take 35 mg by mouth.     Harrison Anderson MD       Allergies   Allergen Reactions    Latex Unknown (comments)    Sulfa (Sulfonamide Antibiotics) Rash        Review of Systems:  A comprehensive review of systems was negative except for that written in the History of Present Illness. Objective:     Visit Vitals    /73    Pulse (!) 112    Temp 99.9 °F (37.7 °C)    Resp 12    Ht 6' (1.829 m)    Wt 89.4 kg (197 lb)    SpO2 95%    Breastfeeding No    BMI 26.72 kg/m2     Temp (24hrs), Av.1 °F (37.3 °C), Min:98.4 °F (36.9 °C), Max:99.9 °F (37.7 °C)       Lines:  Peripheral IV:            Physical Exam:    General:  Alert, cooperative,    Eyes:  Sclera anicteric. Pupils equally round and reactive to light. Mouth/Throat: Mucous membranes normal, oral pharynx clear   Neck: Supple   Lungs:   Crackles bilaterally   CV:  Regular rate and rhythm, systolic murmur, click, rub or gallop   Abdomen:   Soft, non-tender.  bowel sounds normal. non-distended   Extremities: 4+ edema BLE   Skin: +track marks along both arms   Lymph nodes: Cervical and supraclavicular normal   Musculoskeletal: No swelling or deformity of joints- walking ok   Lines/Devices:  Intact, no erythema, drainage or tenderness   Psych: Alert and oriented, normal mood affect given the setting       Data Review:     CBC:  Recent Labs      10/16/18   0551  10/15/18   2233  10/14/18   1250   WBC  8.7  7.8  12.0*   GRANS  72  61  76   MONOS  10  13*  10   EOS  1  1  0*   ANEU  6.3  4.8  9.2*   ABL  1.4  1.9  1.5   HGB  8.3*  7.9*  9.0*   HCT  27.9*  26.1*  30.1*   PLT  252  235  234       BMP:  Recent Labs      10/16/18   0551  10/15/18   2233  10/14/18   1250   CREA  0.95  0.96  0.86   BUN  13  14  16   NA  131*  131*  131*   K  3.1*  2.8*  4.1   CL  91*  92*  93*   CO2  32  32  28   AGAP  8  7  10   GLU  95  102*  112*       LFTS:  Recent Labs      10/16/18   0551  10/15/18   2233  10/14/18   1250   TBILI  2.2*  1.8*  2.6*   ALT  64  68*  122*   SGOT  29  30  55*   AP  99  99  109   TP  6.6  5.7  6.9   ALB  1.8*  1.7*  1.8*       Microbiology: All Micro Results     Procedure Component Value Units Date/Time    CULTURE, BODY FLUID Dianne Ege STAIN [[id number]] Collected:  10/16/18 0951    Order Status:  Completed Specimen:  Thoracentesis Updated:  10/16/18 1420    FUNGUS CULTURE AND SMEAR [[id number]] Collected:  10/16/18 0951    Order Status:  Completed Specimen:  Other Updated:  10/16/18 1126    AFB CULTURE + SMEAR W/RFLX ID FROM CULTURE [[id number]] Collected:  10/16/18 0951    Order Status:  Completed Updated:  10/16/18 1123    CULTURE, BLOOD [[id number]] Collected:  10/15/18 2233    Order Status:  Completed Specimen:  Blood from Blood Updated:  10/16/18 0939     Special Requests: RIGHT ANTECUBITAL        Culture result: NO GROWTH AFTER 10 HOURS       CULTURE, BLOOD [[id number]] Collected:  10/15/18 2235    Order Status:  Completed Specimen:  Blood from Blood Updated:  10/16/18 0939     Special Requests: RIGHT ARM        Culture result: NO GROWTH AFTER 10 HOURS       CULTURE, BLOOD [[id number]] Collected:  10/14/18 1250    Order Status:  Completed Specimen:  Whole Blood from Blood Updated:  10/16/18 0939     Special Requests: --        RIGHT  HAND       Culture result: NO GROWTH 2 DAYS       CULTURE, BLOOD [[id number]] Collected:  10/14/18 1251    Order Status:  Completed Specimen:  Whole Blood from Blood Updated:  10/16/18 0939     Special Requests: --        HAND  LEFT       Culture result: NO GROWTH 2 DAYS       CULTURE, BLOOD [[id number]] Collected:  10/14/18 1300    Order Status:  Canceled Specimen:  Whole Blood from Blood           Imaging:   10/16/18 CXR   IMPRESSION: Suspected pleural effusions with bilateral lower lobe atelectasis or  Consolidation. 10/14/18 CXR  IMPRESSION: Cardiomegaly and large pleural effusions, right more than left.   Pleural effusions are new since 30 August 2018  Signed By: Melissa Atkins NP     October 16, 2018

## 2018-10-16 NOTE — PROGRESS NOTES
Sitter here to sit with patient. Pt. States, \"I am so angry. \"  Does not want sitter in the room. Explained to pt. Why she is needed.

## 2018-10-16 NOTE — PROGRESS NOTES
Care Management Interventions PCP Verified by CM: No 
Mode of Transport at Discharge: Other (see comment) Transition of Care Consult (CM Consult): Other Current Support Network: Relative's Home Confirm Follow Up Transport: Family Plan discussed with Pt/Family/Caregiver: Yes Freedom of Choice Offered: Yes Discharge Location Discharge Placement: Home Visited with pt regarding plans for discharge, pt plans to go stay with her mother once d/c form hospital. Pt states that she has been in contact with [facility] for some time. Does not want any rehab, states \"it will be good for me to go stay with my mom, she also has my 12yr old. My ex has my 2yr old and he has been clean for about 15 yrs. \"

## 2018-10-16 NOTE — CONSULTS
[facility] Cardiology Consult                Date of  Admission: 10/14/2018 12:05 PM     Primary Care Physician: None  Primary Cardiologist: None  Referring Physician: Dr. Sulma Padilla  Consulting Physician: Dr. Trip Hanna    CC/Reason for consult: sHF with EF 10%, endocarditis 2/2 IVDU      Clemente Dennison is a 28 y.o. female with PMHx of Hep C and IVDU who was admitted on 10/14/18 by the Hospitalist after presenting to the ED at [facility] with c/o SOB and LE edema. She has previously dx sHF with EF 10% in 4/2018 2/2 IVDU. She was recently treated at [facility] from 9/4/18 - 9/28/18 for R sacroiliac septic arthritis and MSSA bacteremia and presumed endocarditis with likely pulmonary septic emboli on CT. However, she left AMA after her PICC line was discontinued. She had apparently been injecting heroin into her PICC line. After D/C, she has not been taking her CHF meds as directed. She last used IV heroin on 10/13/18. She presented to [facility] [id number] [facility] with c/o SOB and LE edema. She was admitted for sepsis management by the Hospitalist. Cardiology asked to evaluate for sHF management. BNP on admission 956 (was 783 on 9/4/18)    Per Care Everywhere [facility] hospitalization:  Patient was initially admitted after recent IV heroin use found to have sepsis secondary to infectious endocarditis as well as septic arthritis. She was found to have MSSA bacteremia and was placed on continuous nafcillin infusion with the expected end of treatment on 10/22. She has a known of opiate use disorder and frequently used IV heroin and meth prior to admission. She been placed on methadone by the methadone clinic about a month before admission was still using heroin intermittently. She continued to have symptoms of opiate withdrawal and was requesting increased doses of narcotics.  She was continued on her same dose of methadone and extensive discussions with her about the risks of being on the medicine given her prolonged QTC and severe congestive heart failure as well as interventricular conduction delay. She is at high risk of sudden cardiac death. She chose to continue on the methadone despite explaining that Suboxone would be much safer for her as cost would be prohibitive. Patient admitted to doing IV heroin while being admitted. She had UDS performed which confirmed this. After discussion with the patient decision was made to remove her PICC line however she refused. Patient then elected to leave against medical advice despite understanding the risks of incomplete treatment for her MSSA bacteremia. She also had acute kidney injury (Cr 1.80 on day of discharge) however she left before further workup was able to be performed to document improvement. Diagnosis    Heroin abuse (HCC)    UTI (urinary tract infection)    Dilated cardiomyopathy (HCC)    Systolic CHF, acute on chronic ([facility]    Sinus tachycardia    Leukocytosis    Sepsis ([address]    MSSA bacteremia    Septic arthritis ([facility])    Prolonged Q-T interval on ECG    Transaminitis    Hyponatremia    Pleural effusion    Hypomagnesemia    Hypokalemia       Past Medical History:   Diagnosis Date    Anxiety     [facility] ([facility]     Hypertension     Methamphetamine abuse ([facility]     Neurological disorder     migraines     Polysubstance abuse ([facility]       Past Surgical History:   Procedure Laterality Date    HX APPENDECTOMY      HX GYN           Allergies   Allergen Reactions    Latex Unknown (comments)    Sulfa (Sulfonamide Antibiotics) Rash      History reviewed. No pertinent family history.      Current Facility-Administered Medications   Medication Dose Route Frequency    NUTRITIONAL SUPPORT ELECTROLYTE PRN ORDERS   Does Not Apply PRN    furosemide (LASIX) injection 40 mg  40 mg IntraVENous BID    lisinopril (PRINIVIL, ZESTRIL) tablet 2.5 mg  2.5 mg Oral DAILY    enoxaparin (LOVENOX) injection 40 mg  40 mg SubCUTAneous Q24H    magnesium oxide (MAG-OX) tablet 400 mg  400 mg Oral TID    potassium chloride (K-DUR, KLOR-CON) SR tablet 20 mEq  20 mEq Oral BID    albuterol-ipratropium (DUO-NEB) 2.5 MG-0.5 MG/3 ML  3 mL Nebulization Q4H PRN    ferrous sulfate tablet 325 mg  1 Tab Oral TID WITH MEALS    senna-docusate (PERICOLACE) 8.6-50 mg per tablet 2 Tab  2 Tab Oral QHS    [START ON 10/17/2018] polyethylene glycol (MIRALAX) packet 17 g  17 g Oral DAILY    magnesium hydroxide (MILK OF MAGNESIA) 400 mg/5 mL oral suspension 30 mL  30 mL Oral DAILY PRN    sodium chloride (NS) flush 10 mL  10 mL InterCATHeter Q8H    sodium chloride (NS) flush 10 mL  10 mL InterCATHeter PRN    sodium chloride (NS) flush 5-10 mL  5-10 mL IntraVENous Q8H    sodium chloride (NS) flush 5-10 mL  5-10 mL IntraVENous PRN    acetaminophen (TYLENOL) tablet 650 mg  650 mg Oral Q4H PRN    spironolactone (ALDACTONE) tablet 25 mg  25 mg Oral DAILY    methadone (DOLOPHINE) tablet 35 mg  35 mg Oral DAILY    ceFAZolin (ANCEF) 2 g/20 mL in sterile water IV syringe  2 g IntraVENous Q8H       Review of Systems   Constitutional: Negative for chills and fever. Respiratory: Positive for shortness of breath. Negative for wheezing. Cardiovascular: Positive for leg swelling and PND. Negative for chest pain and palpitations. + LE edema and SOB   Gastrointestinal: Negative for heartburn and nausea. Neurological: Negative for dizziness. Physical Exam  Vitals:    10/16/18 1031 10/16/18 1141 10/16/18 1210 10/16/18 1408   BP: 110/78 111/73 121/73    Pulse: 98 (!) 105 (!) 112    Resp: 29 25 12    Temp:       SpO2: 96% 97% 99% 95%   Weight:       Height:           Physical Exam:  Physical Exam   Constitutional: She is oriented to person, place, and time and well-developed, well-nourished, and in no distress. HENT:   Head: Normocephalic. Neck: Normal range of motion. + JVD   Cardiovascular:   Mild tachycardic  + 2+ LE edema bilateral    Pulmonary/Chest:   Crackles in bases    Abdominal: Soft.  Bowel sounds are normal.   Musculoskeletal: Normal range of motion. Neurological: She is alert and oriented to person, place, and time. Skin: Skin is warm and dry. Cardiographics    Telemetry: ST with short run NSVT  ECG: done on admission --  with some inferolateral ST depression noted  Echocardiogram: 9/5/2018 -- at [facility]  · The left ventricular systolic function is decreased (< 20%). · The left ventricle is severely dilated. · Left ventricular global hypokinesis. · The right ventricle is mildly dilated. · The left atrium is mildly dilated. · The right atrium is mildly dilated. · Moderate mitral valve regurgitation. Labs:   Recent Labs      10/16/18   0551  10/15/18   2233   NA  131*  131*   K  3.1*  2.8*   MG  1.2*   --    BUN  13  14   CREA  0.95  0.96   GLU  95  102*   WBC  8.7  7.8   HGB  8.3*  7.9*   HCT  27.9*  26.1*   PLT  252  235        Assessment/Plan:     Assessment:      Principal Problem:    Sepsis -- per primary team/ID    Active Problems:    Heroin abuse -- needs cessation      Systolic CHF, acute on chronic -- on BB, ACEi, aldactone and lasix at home -- lasix currently on hold, will stop BB with cocaine + UDS      MSSA bacteremia -- per ID      Septic arthritis -- per ID      Prolonged Q-T interval on ECG -- QTc on admission  -- apparently med change from Methadone to Suboxone has been d/w Pt and she refuses      Transaminitis -- per IM      Hyponatremia -- received 500cc fluid bolus in ED,       Pleural effusion -- per IM      Hypomagnesemia -- was 1.2 today, supplemented      Hypokalemia -- was 3.1 today, supplemented     NSVT- likely secondary to severe LV dysfunction. See meds above including ACE-I and aldactone. No BB with cocaine abuse per UDS. Patient is moving to [place] shortly and wants further cardiac w/u in [place] ie will consider life vest once she relocates. Thank you very much for this referral. We appreciate the opportunity to participate in this patient's care. We will follow along with above stated plan. Sean Hope NP  Consulting MD: Kimberlyn Thrasher    I have personally seen and examined patient and agree with above assessment. I agree and confirm with findings with additional details/exceptions as listed below:    History of severe LV dysfunction (noted initially at [facility]. 4/18); likely NICM secondary to substance abuse. Recent noted admission to [facility] with MSSA bacteremia (9/18), likely septic pulmonary infarcts on CT chest and presumed rt sided endocarditis. Was treated with IV abx but left AMA with incomplete abx therapy with continued substance abuse. Also non compliance with medications. Presented with worsening SOB/wt gain/worsening LE edema. Noted to be volume overloaded and IV diuresed with good response and improved symptoms. Continue IV diuresis. Noted 10 bt run of NSVT and likely contributed by hypokalemia/hypomagnesemia. Replete to maintain K >4, Mg >2. With LV dysfunction, titrate LV dysfunction meds as tolerated but withhold BB therapy with ongoing cocaine/heroine use. Not device candidate even if persistent LV dysfunction with ongoing substance abuse and would defer consideration for Lifevest. Extensively stressed need for compliance/refraining from substance abuse (stated she didn't need any more lectures). Appears likely heading to [place] in the near future and wants to pursue continued cardiac follow up there. Further recommendations pending clinical course.         Brayden Fong MD  10/16/2018

## 2018-10-16 NOTE — PROGRESS NOTES
MIDLINE Placement Note PRE-PROCEDURE VERIFICATION 
PROCEDURE DETAIL Time out completed with Padilla Aceves RN VAT and everyone in agreement with procedure. A single lumen Midline was started for vascular access. The following documentation is in addition to the Midline properties in the lines/airways flowsheet : 
Lot #: [id number] Xylocaine used: YES Mid-Arm Circumference: 25 (cm) Internal Catheter Length: 8 (cm) Internal Catheter Total Length: 8 (cm) Vein Selection for Midline:left brachial 
 
Line is okay to use:  
 
Radha Oneill RN

## 2018-10-16 NOTE — PROGRESS NOTES
Interdisciplinary team rounds were held 10/16/2018 with the following team members:Care Management, Nursing, Physical Therapy and Physician and the patient. Plan of care discussed. See clinical pathway and/or care plan for interventions and desired outcomes.

## 2018-10-16 NOTE — CONSULTS
CONSULT NOTE    [place]    10/16/2018    Date of Admission:  10/14/2018    The patient's chart is reviewed and the patient is discussed with the staff. Subjective:     Patient is a 28 y.o.  female seen and evaluated at the request of Dr. Ayla Baird. She was admitted 10/14 with   cc of SOB and LE edema. 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 Patient originally treated for bacteremia/endocarditis at [facility] with nafcillin ending treatment 10/22/18. Patient used IV heroin via her PICC line while at [facility] and left AMA once they discontinued her PICC line. Once discharged, patient had no follow up and has not been taking her CHF medications until last week.       Over the past few weeks, she has had increased SOB and LE edema. Admits to using IV heroin yesterday. No new skin lesions. She has been in icu rxed for sepsis. Cards and id are to see the pt. And she is getting lasix.   And brianne ancef for mssa endocarditis    Review of Systems  Constitutional: positive for fevers  Eyes: negative for visual disturbance  Ears, nose, mouth, throat, and face: negative for sore throat  Respiratory: positive for cough or dyspnea on exertion  Cardiovascular: positive for dyspnea, lower extremity edema  Gastrointestinal: negative for nausea and vomiting  Musculoskeletal:positive for arthralgias  Neurological: negative for headaches    Patient Active Problem List   Diagnosis Code    Heroin abuse ([facility] F11.10    UTI (urinary tract infection) N39.0    Dilated cardiomyopathy ([facility] W44.4    Systolic CHF, acute on chronic (HCC) I50.23    Sinus tachycardia R00.0    Leukocytosis D72.829    Sepsis ([address] A41.9    MSSA bacteremia R78.81    Septic arthritis ([facility] M00.9    Prolonged Q-T interval on ECG R94.31    Transaminitis R74.0    Hyponatremia E87.1    Pleural effusion J90           Prior to Admission Medications   Prescriptions Last Dose Informant Patient Reported? Taking?   carvedilol (COREG) 3.125 mg tablet   Yes Yes   Sig: Take  by mouth two (2) times daily (with meals). furosemide (LASIX) 40 mg tablet   Yes Yes   Sig: Take 40 mg by mouth daily. lisinopril (PRINIVIL, ZESTRIL) 5 mg tablet   Yes Yes   Sig: Take 5 mg by mouth daily. methadone HCl (METHADONE PO)   Yes No   Sig: Take 35 mg by mouth. spironolactone (ALDACTONE) 25 mg tablet   Yes Yes   Sig: Take 25 mg by mouth daily. Facility-Administered Medications: None       Past Medical History:   Diagnosis Date    Anxiety     [facility] failure ([facility]     Hypertension     Methamphetamine abuse ([facility]     Neurological disorder     migraines     Polysubstance abuse ([facility]      Past Surgical History:   Procedure Laterality Date    HX APPENDECTOMY      HX GYN           Social History     Social History    Marital status: LEGALLY      Spouse name: N/A    Number of children: N/A    Years of education: N/A     Occupational History    Not on file. Social History Main Topics    Smoking status: Current Every Day Smoker     Packs/day: 1.00    Smokeless tobacco: Never Used    Alcohol use Yes      Comment: rarely    Drug use: Yes     Special: Heroin, Cocaine, Methamphetamines    Sexual activity: Yes     Partners: Female, Male     Birth control/ protection: Condom     Other Topics Concern    Not on file     Social History Narrative     History reviewed. No pertinent family history.   Allergies   Allergen Reactions    Latex Unknown (comments)    Sulfa (Sulfonamide Antibiotics) Rash       Current Facility-Administered Medications   Medication Dose Route Frequency    NUTRITIONAL SUPPORT ELECTROLYTE PRN ORDERS   Does Not Apply PRN    magnesium sulfate 4 g/100 mL IVPB  4 g IntraVENous ONCE    furosemide (LASIX) injection 40 mg  40 mg IntraVENous BID    lisinopril (PRINIVIL, ZESTRIL) tablet 2.5 mg  2.5 mg Oral DAILY    enoxaparin (LOVENOX) injection 40 mg  40 mg SubCUTAneous Q24H    albuterol (PROVENTIL VENTOLIN) nebulizer solution 2.5 mg  2.5 mg Nebulization Q6H RT    sodium chloride (NS) flush 10 mL  10 mL InterCATHeter Q8H    sodium chloride (NS) flush 10 mL  10 mL InterCATHeter PRN    sodium chloride (NS) flush 5-10 mL  5-10 mL IntraVENous Q8H    sodium chloride (NS) flush 5-10 mL  5-10 mL IntraVENous PRN    acetaminophen (TYLENOL) tablet 650 mg  650 mg Oral Q4H PRN    carvedilol (COREG) tablet 3.125 mg  3.125 mg Oral BID WITH MEALS    spironolactone (ALDACTONE) tablet 25 mg  25 mg Oral DAILY    methadone (DOLOPHINE) tablet 35 mg  35 mg Oral DAILY    ceFAZolin (ANCEF) 2 g/20 mL in sterile water IV syringe  2 g IntraVENous Q8H         Objective:     Vitals:    10/16/18 0531 10/16/18 0601 10/16/18 0701 10/16/18 0830   BP: 127/80 113/83 110/74    Pulse: (!) 113 (!) 112 (!) 113    Resp: 28 28 22    Temp:   99.9 °F (37.7 °C)    SpO2: 97% 97% 98% 98%   Weight:       Height:           PHYSICAL EXAM     Constitutional:  the patient is well developed and in no acute distress  EENMT:  Sclera clear, pupils equal, oral mucosa moist  Respiratory: decreased breath sounds in bases  Cardiovascular:  RRR without M,G,R  Gastrointestinal: soft and non-tender; with positive bowel sounds. Musculoskeletal: warm without cyanosis. There is 3+ lower leg edema.   Skin:  no jaundice or rashes, no wounds   Neurologic: no gross neuro deficits     Psychiatric:  alert and oriented x 3    CXR:        Recent Labs      10/16/18   0551  10/15/18   2233  10/14/18   1250   WBC  8.7  7.8  12.0*   HGB  8.3*  7.9*  9.0*   HCT  27.9*  26.1*  30.1*   PLT  252  235  234     Recent Labs      10/16/18   0551  10/15/18   2233  10/14/18   1250   NA  131*  131*  131*   K  3.1*  2.8*  4.1   CL  91*  92*  93*   GLU  95  102*  112*   CO2  32  32  28   BUN  13  14  16   CREA  0.95  0.96  0.86   MG 1.2*   --    --    CA  8.1*  7.5*  8.0*   ALB  1.8*  1.7*  1.8*   TBILI  2.2*  1.8*  2.6*   ALT  64  68*  122*   SGOT  29  30  55*     No results for input(s): PH, PCO2, PO2, HCO3 in the last 72 hours. No results for input(s): LCAD, LAC in the last 72 hours. Assessment:  (Medical Decision Making)     Hospital Problems  Never Reviewed          Codes Class Noted POA    Pleural effusion ICD-10-CM: J90  ICD-9-CM: 511.9  10/16/2018 Unknown        * (Principal)Sepsis ([facility] ICD-10-CM: A41.9  ICD-9-CM: 038.9, 995.91  10/14/2018 Unknown        MSSA bacteremia ICD-10-CM: R78.81  ICD-9-CM: 790.7, 041.11  10/14/2018 Unknown        Septic arthritis ([facility] ICD-10-CM: M00.9  ICD-9-CM: 711.00  10/14/2018 Unknown        Prolonged Q-T interval on ECG ICD-10-CM: R94.31  ICD-9-CM: 794.31  10/14/2018 Unknown        Transaminitis ICD-10-CM: R74.0  ICD-9-CM: 790.4  10/14/2018 Unknown        Hyponatremia ICD-10-CM: E87.1  ICD-9-CM: 276.1  10/14/2018 Unknown        Systolic CHF, acute on chronic [facility]) ICD-10-CM: I50.23  ICD-9-CM: 428.23, 428.0  8/30/2018 Yes        Heroin abuse ([facility] ICD-10-CM: F11.10  ICD-9-CM: 305.50  4/10/2018 Yes              Plan:  (Medical Decision Making)   1   Thoracentesis  2   Iv lasix  3   antibx per id  --    More than 50% of the time documented was spent in face-to-face contact with the patient and in the care of the patient on the floor/unit where the patient is located. Thank you very much for this referral.  We appreciate the opportunity to participate in this patient's care. Will follow along with above stated plan.     Eunice Nelson MD

## 2018-10-16 NOTE — PROCEDURES
PROCEDURE:    DIAGNOSTIC/THERAPEUTIC THORACENTESIS/PLEURAL MANNOMETRY. PRE-OP DIAGNOSIS:    R PLEURAL EFFUSION    POST-OP DIAGNOSIS:    R PLEURAL EFFUSION    ASSISTANT:        ANESTHESIA:    LOCAL ANESTHESIA WITH 1% LIDOCAINE 10 CC TOTAL. CHEST ULTRASOUND FINDINGS:    A Turbo-M, Sonosite ultrasound with a 5-16 mHz probe was used to image the chest and localize the pleural effusion on the Left/and/Right chest.    A large/moderate/small anechoic space was seen on the Left/Right consistent with an uncomplicated pleural effusion. A large/moderate/small,  complex pleural space was identified on ultrasound, there were multiple loculations and septetions present, with the pleural fluid having a mixed echogenic character. DESCRIPTION OF PROCEDURE:    After obtaining informed consent and localizing the safest location for thoracentesis, the  9th intercostal space was marked with a blunt, plastic needle cap in the mid scapular line. An [employer] AK-0100 Pleral-Seal thoracentesis kit was used to perform the procedure. The skin was  cleansed with the supplied  chlorhexididne swab and then draped in the usual fasion. Using the previously marked location as a giude, a 22 G 1.5 inch needle was used to inject 10 cc of 1% lidocaine into the skin and subcutaneous tissue, as well as onto the underlying rib and inter-costal muscles, pleural fluid was aspirated to assure proper location, prior to removing the anesthesia needle. A 3mm  incision was then made, with the supplied scalpel in the usual fashion to facilitate the insertiopn of the thoracentesis needle. The needle with an 8French thoracentesis catheter was then introduced into the chest through the previously made incision in the usual fashion, the rib localized with the needle, and the catheter then marched over the rib into the pleural space.     After aspirating fluid, the thoracentesis catheter was then placed into the chest using the needle itself as a trocar. The needle was then removed and the catheter was attached to the supplied tubing without complication. 1000 cc of /serosanguinous/ fluid, was aspirated and sent for analysis. Fluid was sent for the following tests:    Cell count with differential  LDH  Glucose  Total protein  Cytology      AFB  Fungus  Routine culture and Gram stain      Post procedure US confirmed complete/incomplete drainage of the effusion.     EBL:     minimal      COMPLICATIONS:    none    Liudmila Lopez MD

## 2018-10-16 NOTE — PROGRESS NOTES
Problem: Nutrition Deficit Goal: *Optimize nutritional status Nutrition Follow Up: 
Reason for initial assessment: BPA - Malnutrition Screening Tool positive for unintended weight loss > 33# and eating poorly r/t decreased appetite Consult received 10/16 for General Nutrition Management and Supplement per Dr. vEan Waite.  
  
Assessment:  
Admitted with sepsis, heroin abuse, systolic CHF, MSSA bacteremia. PMH: CHF, IV drug abuse, R sacroiliac arthritis, MSSA/endocarditis. Current Diet:  Cardiac Pt states she ate ~ 1/2 breakfast this am; only bites at lunch today r/t nausea; enjoys breakfast type foods better than lunch and supper foods; pt states her food is not seasoned well-currently on a Cardiac diet. Pt has tried the [employer] supplement and said it was OK.  
 
Pertinent Labs:  Hyponatremia, hypokalemia, hypomagnesemia; pt received K and Mg bolus Electrolyte replacement protocols active on patient's [facility] Anthropometrics:Height: 6' (182.9 cm),  Weight: 88.5 kg (195 lb), unspecified, Body mass index is 26.45 kg/(m^2). BMI class of overweight. Weight 10/16:  89.4 kg-weight source not specified. Pt with 2+ LE edema bilaterally. Limited validity of BMI secondary significant edema. NFPE: Mild clavicle muscle loss. Malnutrition Criteria: ASPEN Malnutrition Criteria Acute Illness, Chronic Illness, or Social/Enviornmental: Social/environmental illness Weight Loss: 7.5% x 3 month Muscle Mass: Mild Fluid Accumulation: Severe ASPEN Malnutrition Score - Social/Environmental Illness: 8 Social/Environmental Illness - Malnutrition Diagnosis: Moderate malnutrition  
  
Macronutrient needs: Wt used: 73kg EER:  5467-9674 kcal /day (25-30 kcal/kg ideal BW) EPR:  73-88 grams protein/day (1-1.2 grams/kg ideal BW) 
  
Intake/Comparative Standards:  Based on verbalized po intake today, pt potentially meets < 25% estimated kcal and protein needs. .  
  
Intervention: Meals and snacks: Continue current diet. Encouraged patient to order \"breakfast\" type foods for evening meal if she prefers that type of food. Nutrition supplement therapy:  Discontinued Glucerna Shake supplement. Initiated [employer] 2 X daily; flavor preference noted. Discharge Nutrition Plan: Too soon to determine.  
 
David Feliciano, [address], [place], MPH 
[phone]

## 2018-10-16 NOTE — H&P
Date of Surgery Update: 
Sarahi Ovalle was seen and examined. History and physical has been reviewed. The patient has been examined.  There have been no significant clinical changes since the completion of the originally dated History and Physical. 
 
Signed By: Liudmila Lopez MD   
 October 16, 2018 9:21 AM

## 2018-10-16 NOTE — PROGRESS NOTES
Saw pt in interdisciplinarily rounds, plan of care and discharge date/ location discussed. Per the hospitalitis plans to move pt to m/s bed today, sitter at bedside for pt safety.

## 2018-10-16 NOTE — PROGRESS NOTES
Progress Note Patient: Camille Vallejo               Sex: female          MRN: [id number] YOB: 1985      Age:  28 y. o. PCP:  None Treatment Team: Attending Provider: Kaya Ho DO; Care Manager: Dee Clay; Consulting Provider: Blayne Cat MD; Consulting Provider: Debbie Hernandez MD; Utilization Review: Mati Linton RN; Care Manager: Yamilka Pedroza; Consulting Provider: Angie Beach; Consulting Provider: Parul Martínez MD 
Subjective:  
 
New patient for me today. Patient complains of shortness of breath, worse with lying flat, better with sitting up. Complains of moderate, achy pain in her right lower back and hip area. Worse with activity. Better with methadone. Patient had fever yesterday, 101F. No chest pain or abdominal pain or diarrhea or vomitings. Bilateral lower extremity edema. Somewhat improved since admission. Objective:  
Physical Exam:  
Visit Vitals  /73  Pulse (!) 112  Temp 99.9 °F (37.7 °C)  Resp 12  Ht 6' (1.829 m)  Wt 89.4 kg (197 lb)  SpO2 95%  Breastfeeding No  
 BMI 26.72 kg/m2 Temp (24hrs), Av.4 °F (37.4 °C), Min:98.4 °F (36.9 °C), Max:101.3 °F (38.5 °C) Oxygen Therapy O2 Sat (%): 95 % (10/16/18 1408) Pulse via Oximetry: 113 beats per minute (10/16/18 1408) O2 Device: Nasal cannula (10/16/18 1408) O2 Flow Rate (L/min): 2 l/min (10/16/18 1408) FIO2 (%): 28 % (10/16/18 1408) Intake/Output Summary (Last 24 hours) at 10/16/18 1430 Last data filed at 10/16/18 8648 Gross per 24 hour Intake              240 ml Output             2400 ml Net            -2160 ml General: Conscious, No acute distress Eyes:  JR, No pallor/icterus HENT:             Oral Mucosa is Moist, No sinus tenderness Neck:               Supple, elevated JVP Lungs:  Diminished air entry bibasilar with crackles, rt > left,  No significant wheeze/rhonchi Heart:  S1 S2 regular Abdomen: Soft, normal bowel sounds, NTND, No guarding/rigidity/rebound tend. Extremities: Dameon. Severe pitting pedal edema Neurologic:  AAOX3, No acute FND, Motor:  LUE: 5/5, LLE: 5/5, RUE: 5/5, RLE: 5/5 Skin:                No acute rashes Musculoskeletal: No Acute findings Psych:             Calm and appropriate now. LAB Recent Results (from the past 24 hour(s)) CBC WITH AUTOMATED DIFF Collection Time: 10/15/18 10:33 PM  
Result Value Ref Range WBC 7.8 4.3 - 11.1 K/uL  
 RBC 3.02 (L) 4.05 - 5.2 M/uL HGB 7.9 (L) 11.7 - 15.4 g/dL HCT 26.1 (L) 35.8 - 46.3 % MCV 86.4 79.6 - 97.8 FL  
 MCH 26.2 26.1 - 32.9 PG  
 MCHC 30.3 (L) 31.4 - 35.0 g/dL  
 RDW 18.8 % PLATELET 959 007 - 933 K/uL MPV 9.0 (L) 9.4 - 12.3 FL ABSOLUTE NRBC 0.00 0.0 - 0.2 K/uL  
 DF AUTOMATED NEUTROPHILS 61 43 - 78 % LYMPHOCYTES 24 13 - 44 % MONOCYTES 13 (H) 4.0 - 12.0 % EOSINOPHILS 1 0.5 - 7.8 % BASOPHILS 0 0.0 - 2.0 % IMMATURE GRANULOCYTES 1 0.0 - 5.0 %  
 ABS. NEUTROPHILS 4.8 1.7 - 8.2 K/UL  
 ABS. LYMPHOCYTES 1.9 0.5 - 4.6 K/UL  
 ABS. MONOCYTES 1.0 0.1 - 1.3 K/UL  
 ABS. EOSINOPHILS 0.1 0.0 - 0.8 K/UL  
 ABS. BASOPHILS 0.0 0.0 - 0.2 K/UL  
 ABS. IMM. GRANS. 0.0 0.0 - 0.5 K/UL METABOLIC PANEL, COMPREHENSIVE Collection Time: 10/15/18 10:33 PM  
Result Value Ref Range Sodium 131 (L) 136 - 145 mmol/L Potassium 2.8 (LL) 3.5 - 5.1 mmol/L Chloride 92 (L) 98 - 107 mmol/L  
 CO2 32 21 - 32 mmol/L Anion gap 7 mmol/L Glucose 102 (H) 65 - 100 mg/dL BUN 14 6 - 23 MG/DL Creatinine 0.96 0.6 - 1.0 MG/DL  
 GFR est AA >60 >60 ml/min/1.73m2 GFR est non-AA >60 ml/min/1.73m2 Calcium 7.5 (L) 8.3 - 10.4 MG/DL Bilirubin, total 1.8 (H) 0.2 - 1.1 MG/DL  
 ALT (SGPT) 68 (H) 12 - 65 U/L  
 AST (SGOT) 30 15 - 37 U/L Alk. phosphatase 99 50 - 136 U/L Protein, total 5.7 g/dL Albumin 1.7 (L) 3.5 - 5.0 g/dL Globulin 4.0 (H) 2.3 - 3.5 g/dL A-G Ratio 0.4 CULTURE, BLOOD Collection Time: 10/15/18 10:33 PM  
Result Value Ref Range Special Requests: RIGHT ANTECUBITAL Culture result: NO GROWTH AFTER 10 HOURS    
CULTURE, BLOOD Collection Time: 10/15/18 10:35 PM  
Result Value Ref Range Special Requests: RIGHT ARM Culture result: NO GROWTH AFTER 10 HOURS    
CBC WITH AUTOMATED DIFF Collection Time: 10/16/18  5:51 AM  
Result Value Ref Range WBC 8.7 4.3 - 11.1 K/uL  
 RBC 3.22 (L) 4.05 - 5.2 M/uL HGB 8.3 (L) 11.7 - 15.4 g/dL HCT 27.9 (L) 35.8 - 46.3 % MCV 86.6 79.6 - 97.8 FL  
 MCH 25.8 (L) 26.1 - 32.9 PG  
 MCHC 29.7 (L) 31.4 - 35.0 g/dL  
 RDW 18.6 % PLATELET 801 672 - 456 K/uL MPV 8.9 (L) 9.4 - 12.3 FL ABSOLUTE NRBC 0.00 0.0 - 0.2 K/uL  
 DF AUTOMATED NEUTROPHILS 72 43 - 78 % LYMPHOCYTES 16 13 - 44 % MONOCYTES 10 4.0 - 12.0 % EOSINOPHILS 1 0.5 - 7.8 % BASOPHILS 0 0.0 - 2.0 % IMMATURE GRANULOCYTES 1 0.0 - 5.0 %  
 ABS. NEUTROPHILS 6.3 1.7 - 8.2 K/UL  
 ABS. LYMPHOCYTES 1.4 0.5 - 4.6 K/UL  
 ABS. MONOCYTES 0.9 0.1 - 1.3 K/UL  
 ABS. EOSINOPHILS 0.1 0.0 - 0.8 K/UL  
 ABS. BASOPHILS 0.0 0.0 - 0.2 K/UL  
 ABS. IMM. GRANS. 0.0 0.0 - 0.5 K/UL METABOLIC PANEL, COMPREHENSIVE Collection Time: 10/16/18  5:51 AM  
Result Value Ref Range Sodium 131 (L) 136 - 145 mmol/L Potassium 3.1 (L) 3.5 - 5.1 mmol/L Chloride 91 (L) 98 - 107 mmol/L  
 CO2 32 21 - 32 mmol/L Anion gap 8 mmol/L Glucose 95 65 - 100 mg/dL BUN 13 6 - 23 MG/DL Creatinine 0.95 0.6 - 1.0 MG/DL  
 GFR est AA >60 >60 ml/min/1.73m2 GFR est non-AA >60 ml/min/1.73m2 Calcium 8.1 (L) 8.3 - 10.4 MG/DL Bilirubin, total 2.2 (H) 0.2 - 1.1 MG/DL  
 ALT (SGPT) 64 12 - 65 U/L  
 AST (SGOT) 29 15 - 37 U/L Alk. phosphatase 99 50 - 136 U/L Protein, total 6.6 g/dL Albumin 1.8 (L) 3.5 - 5.0 g/dL Globulin 4.8 (H) 2.3 - 3.5 g/dL A-G Ratio 0.4 MAGNESIUM Collection Time: 10/16/18  5:51 AM  
Result Value Ref Range Magnesium 1.2 (LL) 1.8 - 2.4 mg/dL TYPE & SCREEN Collection Time: 10/16/18  6:58 AM  
Result Value Ref Range Crossmatch Expiration 10/19/2018 ABO/Rh(D) A NEGATIVE Antibody screen POS Antibody ID ANTI-D Comment POSITIVE ANTIBODY SCREEN CALLED TO [facility] RN @945 10/16/18 HF  
 WEAK D NEG Xr Chest [facility] Result Date: 10/16/2018 PORTABLE CHEST X-RAY HISTORY: Respiratory failure COMPARISON: October 14, 2018 FINDINGS: Bilateral lower lobe atelectasis or consolidation is present with probable pleural effusions. The lung volumes are diminished. EKG leads are present. The cardiac silhouette is prominent. IMPRESSION: Suspected pleural effusions with bilateral lower lobe atelectasis or consolidation. Xr Chest [facility] Result Date: 10/16/2018 IMPRESSION: Suspected pleural effusions with bilateral lower lobe atelectasis or consolidation. All Micro Results Procedure Component Value Units Date/Time CULTURE, BODY FLUID Authmyrtle Null [[id number]] Collected:  10/16/18 5127 Order Status:  Completed Specimen:  Thoracentesis Updated:  10/16/18 1420 FUNGUS CULTURE AND SMEAR [[id number]] Collected:  10/16/18 3584 Order Status:  Completed Specimen:  Other Updated:  10/16/18 1126 AFB CULTURE + SMEAR W/RFLX ID FROM CULTURE [[id number]] Collected:  10/16/18 0951 Order Status:  Completed Updated:  10/16/18 1123 CULTURE, BLOOD [[id number]] Collected:  10/15/18 2233 Order Status:  Completed Specimen:  Blood from Blood Updated:  10/16/18 4976 Special Requests: RIGHT ANTECUBITAL Culture result: NO GROWTH AFTER 10 HOURS     
 CULTURE, BLOOD [[id number]] Collected:  10/15/18 2235 Order Status:  Completed Specimen:  Blood from Blood Updated:  10/16/18 2047 Special Requests: RIGHT ARM Culture result: NO GROWTH AFTER 10 HOURS     
 CULTURE, BLOOD [[id number]] Collected:  10/14/18 1250 Order Status:  Completed Specimen:  Whole Blood from Blood Updated:  10/16/18 9865 Special Requests: --     
  RIGHT 
HAND Culture result: NO GROWTH 2 DAYS     
 CULTURE, BLOOD [[id number]] Collected:  10/14/18 1251 Order Status:  Completed Specimen:  Whole Blood from Blood Updated:  10/16/18 5964 Special Requests: --     
  HAND 
LEFT Culture result: NO GROWTH 2 DAYS     
 CULTURE, BLOOD [[id number]] Collected:  10/14/18 1300 Order Status:  Canceled Specimen:  Whole Blood from Blood Current Medications Reviewed Assessment/Plan 1. Sepsis secondary to MSSA bacteremia 2. MSSA bacterial endocarditis 3. Right sacroiliac septic arthritis 4. IV heroin abuse, cocaine abuse. Patient was counseled regarding cessation. 5.  Acute on chronic systolic CHF exacerbation. 6.  Pleural effusion, right greater than left. Pulmonology consulted and patient had thoracentesis with thousand cc of fluid taken out on 10/16/2018. We will follow-up fluid studies. 7.  Hypokalemia, hypomagnesemia, electrolytes are being replaced. 8.  Hypervolemic hyponatremia, continue IV diuresis and monitor sodium levels. 9.  Nonischemic cardiomyopathy with left ventricular ejection fraction of 10-15%. Cardiology consulted for recommendations. Currently not on beta-blocker secondary to cocaine use. Currently on lisinopril, spironolactone, Lasix. May start her on Cardizem. 10.  Acute transaminitis likely secondary to sepsis. Monitor liver enzymes closely. 11.  Anemia of chronic disease, may be some component of iron deficiency. Started on iron supplements. 12.  Chronic methadone use. 13.  Severe hypoalbuminemia, Dietary consulted for recomm. Keep the patient on IV cefazolin for now, follow-up blood cultures. WBC count normalized. ID consulted for recommendations duration of antibiotics. Continue IV diuresis, monitor electrolytes, replace as needed. Appreciate cardiology, ID, pulmonology recommendations. Subcu Lovenox for DVT prophylaxis. High-risk patient secondary to his medical problems. Zunilda cSherer MD 
October 16, 2018

## 2018-10-17 PROBLEM — E77.8 HYPOPROTEINEMIA (HCC): Status: ACTIVE | Noted: 2018-10-17

## 2018-10-17 LAB
ALBUMIN SERPL-MCNC: 1.6 G/DL (ref 3.5–5)
ALBUMIN/GLOB SERPL: 0.3 {RATIO}
ALP SERPL-CCNC: 97 U/L (ref 50–136)
ALT SERPL-CCNC: 39 U/L (ref 12–65)
ANION GAP SERPL CALC-SCNC: 5 MMOL/L
APTT PPP: 34.4 SEC (ref 23.2–35.3)
AST SERPL-CCNC: 34 U/L (ref 15–37)
BASOPHILS # BLD: 0 K/UL (ref 0–0.2)
BASOPHILS NFR BLD: 1 % (ref 0–2)
BILIRUB DIRECT SERPL-MCNC: 1.2 MG/DL
BILIRUB SERPL-MCNC: 1.8 MG/DL (ref 0.2–1.1)
BUN SERPL-MCNC: 13 MG/DL (ref 6–23)
CALCIUM SERPL-MCNC: 8.1 MG/DL (ref 8.3–10.4)
CHLORIDE SERPL-SCNC: 90 MMOL/L (ref 98–107)
CO2 SERPL-SCNC: 36 MMOL/L (ref 21–32)
CREAT SERPL-MCNC: 0.87 MG/DL (ref 0.6–1)
DIFFERENTIAL METHOD BLD: ABNORMAL
EOSINOPHIL # BLD: 0.1 K/UL (ref 0–0.8)
EOSINOPHIL NFR BLD: 1 % (ref 0.5–7.8)
ERYTHROCYTE [DISTWIDTH] IN BLOOD BY AUTOMATED COUNT: 18.8 %
GLOBULIN SER CALC-MCNC: 4.7 G/DL (ref 2.3–3.5)
GLUCOSE SERPL-MCNC: 89 MG/DL (ref 65–100)
HCT VFR BLD AUTO: 26.4 % (ref 35.8–46.3)
HGB BLD-MCNC: 7.9 G/DL (ref 11.7–15.4)
IMM GRANULOCYTES # BLD: 0.1 K/UL (ref 0–0.5)
IMM GRANULOCYTES NFR BLD AUTO: 1 % (ref 0–5)
INR PPP: 1.2
LYMPHOCYTES # BLD: 1.5 K/UL (ref 0.5–4.6)
LYMPHOCYTES NFR BLD: 20 % (ref 13–44)
MAGNESIUM SERPL-MCNC: 1.6 MG/DL (ref 1.8–2.4)
MCH RBC QN AUTO: 25.4 PG (ref 26.1–32.9)
MCHC RBC AUTO-ENTMCNC: 29.9 G/DL (ref 31.4–35)
MCV RBC AUTO: 84.9 FL (ref 79.6–97.8)
MONOCYTES # BLD: 0.9 K/UL (ref 0.1–1.3)
MONOCYTES NFR BLD: 13 % (ref 4–12)
NEUTS SEG # BLD: 4.8 K/UL (ref 1.7–8.2)
NEUTS SEG NFR BLD: 65 % (ref 43–78)
NRBC # BLD: 0 K/UL (ref 0–0.2)
PHOSPHATE SERPL-MCNC: 2.9 MG/DL (ref 2.5–4.5)
PLATELET # BLD AUTO: 306 K/UL (ref 150–450)
PMV BLD AUTO: 8.9 FL (ref 9.4–12.3)
POTASSIUM SERPL-SCNC: 3.3 MMOL/L (ref 3.5–5.1)
PROT SERPL-MCNC: 6.3 G/DL
PROTHROMBIN TIME: 15.1 SEC (ref 11.5–14.5)
RBC # BLD AUTO: 3.11 M/UL (ref 4.05–5.2)
SODIUM SERPL-SCNC: 131 MMOL/L (ref 136–145)
WBC # BLD AUTO: 7.4 K/UL (ref 4.3–11.1)

## 2018-10-17 PROCEDURE — 65270000029 HC RM PRIVATE

## 2018-10-17 PROCEDURE — 84100 ASSAY OF PHOSPHORUS: CPT

## 2018-10-17 PROCEDURE — 74011250636 HC RX REV CODE- 250/636: Performed by: HOSPITALIST

## 2018-10-17 PROCEDURE — 99232 SBSQ HOSP IP/OBS MODERATE 35: CPT | Performed by: INTERNAL MEDICINE

## 2018-10-17 PROCEDURE — 94640 AIRWAY INHALATION TREATMENT: CPT

## 2018-10-17 PROCEDURE — 74011250637 HC RX REV CODE- 250/637: Performed by: FAMILY MEDICINE

## 2018-10-17 PROCEDURE — 87040 BLOOD CULTURE FOR BACTERIA: CPT

## 2018-10-17 PROCEDURE — 77030033269 HC SLV COMPR SCD KNE2 CARD -B

## 2018-10-17 PROCEDURE — 85025 COMPLETE CBC W/AUTO DIFF WBC: CPT

## 2018-10-17 PROCEDURE — 74011250636 HC RX REV CODE- 250/636: Performed by: FAMILY MEDICINE

## 2018-10-17 PROCEDURE — 97161 PT EVAL LOW COMPLEX 20 MIN: CPT

## 2018-10-17 PROCEDURE — 85730 THROMBOPLASTIN TIME PARTIAL: CPT

## 2018-10-17 PROCEDURE — 36415 COLL VENOUS BLD VENIPUNCTURE: CPT

## 2018-10-17 PROCEDURE — 74011000250 HC RX REV CODE- 250: Performed by: HOSPITALIST

## 2018-10-17 PROCEDURE — 74011250636 HC RX REV CODE- 250/636: Performed by: INTERNAL MEDICINE

## 2018-10-17 PROCEDURE — C9113 INJ PANTOPRAZOLE SODIUM, VIA: HCPCS | Performed by: HOSPITALIST

## 2018-10-17 PROCEDURE — 80076 HEPATIC FUNCTION PANEL: CPT

## 2018-10-17 PROCEDURE — 74011250637 HC RX REV CODE- 250/637: Performed by: HOSPITALIST

## 2018-10-17 PROCEDURE — P9047 ALBUMIN (HUMAN), 25%, 50ML: HCPCS | Performed by: INTERNAL MEDICINE

## 2018-10-17 PROCEDURE — 85610 PROTHROMBIN TIME: CPT

## 2018-10-17 PROCEDURE — 83735 ASSAY OF MAGNESIUM: CPT

## 2018-10-17 PROCEDURE — 74011000250 HC RX REV CODE- 250: Performed by: INTERNAL MEDICINE

## 2018-10-17 PROCEDURE — 74011250637 HC RX REV CODE- 250/637: Performed by: INTERNAL MEDICINE

## 2018-10-17 PROCEDURE — 80048 BASIC METABOLIC PNL TOTAL CA: CPT

## 2018-10-17 RX ORDER — METOPROLOL SUCCINATE 25 MG/1
25 TABLET, EXTENDED RELEASE ORAL 2 TIMES DAILY
Status: DISCONTINUED | OUTPATIENT
Start: 2018-10-17 | End: 2018-10-25 | Stop reason: HOSPADM

## 2018-10-17 RX ORDER — DILTIAZEM HYDROCHLORIDE 30 MG/1
30 TABLET, FILM COATED ORAL EVERY 8 HOURS
Status: DISCONTINUED | OUTPATIENT
Start: 2018-10-17 | End: 2018-10-17

## 2018-10-17 RX ORDER — POTASSIUM CHLORIDE 14.9 MG/ML
20 INJECTION INTRAVENOUS
Status: COMPLETED | OUTPATIENT
Start: 2018-10-17 | End: 2018-10-17

## 2018-10-17 RX ORDER — ALBUTEROL SULFATE 0.83 MG/ML
2.5 SOLUTION RESPIRATORY (INHALATION)
Status: DISCONTINUED | OUTPATIENT
Start: 2018-10-17 | End: 2018-10-25 | Stop reason: HOSPADM

## 2018-10-17 RX ORDER — ONDANSETRON 2 MG/ML
4 INJECTION INTRAMUSCULAR; INTRAVENOUS
Status: DISCONTINUED | OUTPATIENT
Start: 2018-10-17 | End: 2018-10-25 | Stop reason: HOSPADM

## 2018-10-17 RX ORDER — ALBUMIN HUMAN 250 G/1000ML
25 SOLUTION INTRAVENOUS 2 TIMES DAILY
Status: COMPLETED | OUTPATIENT
Start: 2018-10-17 | End: 2018-10-17

## 2018-10-17 RX ORDER — METHADONE HYDROCHLORIDE 10 MG/1
20 TABLET ORAL ONCE
Status: COMPLETED | OUTPATIENT
Start: 2018-10-17 | End: 2018-10-17

## 2018-10-17 RX ORDER — PANTOPRAZOLE SODIUM 40 MG/1
40 TABLET, DELAYED RELEASE ORAL
Status: DISCONTINUED | OUTPATIENT
Start: 2018-10-18 | End: 2018-10-25 | Stop reason: HOSPADM

## 2018-10-17 RX ORDER — MAGNESIUM SULFATE HEPTAHYDRATE 40 MG/ML
2 INJECTION, SOLUTION INTRAVENOUS ONCE
Status: COMPLETED | OUTPATIENT
Start: 2018-10-17 | End: 2018-10-17

## 2018-10-17 RX ADMIN — METHADONE HYDROCHLORIDE 35 MG: 10 TABLET ORAL at 08:07

## 2018-10-17 RX ADMIN — Medication 10 ML: at 17:17

## 2018-10-17 RX ADMIN — FERROUS SULFATE TAB 325 MG (65 MG ELEMENTAL FE) 325 MG: 325 (65 FE) TAB at 08:09

## 2018-10-17 RX ADMIN — Medication 400 MG: at 17:17

## 2018-10-17 RX ADMIN — Medication 2 G: at 08:11

## 2018-10-17 RX ADMIN — ENOXAPARIN SODIUM 40 MG: 40 INJECTION, SOLUTION INTRAVENOUS; SUBCUTANEOUS at 08:07

## 2018-10-17 RX ADMIN — POLYETHYLENE GLYCOL (3350) 17 G: 17 POWDER, FOR SOLUTION ORAL at 08:07

## 2018-10-17 RX ADMIN — POTASSIUM CHLORIDE 20 MEQ: 20 TABLET, EXTENDED RELEASE ORAL at 08:10

## 2018-10-17 RX ADMIN — Medication 400 MG: at 08:09

## 2018-10-17 RX ADMIN — METOPROLOL SUCCINATE 25 MG: 25 TABLET, EXTENDED RELEASE ORAL at 21:28

## 2018-10-17 RX ADMIN — ACETAMINOPHEN 650 MG: 325 TABLET, FILM COATED ORAL at 12:37

## 2018-10-17 RX ADMIN — POTASSIUM CHLORIDE 20 MEQ: 14.9 INJECTION, SOLUTION INTRAVENOUS at 13:12

## 2018-10-17 RX ADMIN — METHADONE HYDROCHLORIDE 20 MG: 10 TABLET ORAL at 13:12

## 2018-10-17 RX ADMIN — ALBUMIN (HUMAN) 25 G: 0.25 INJECTION, SOLUTION INTRAVENOUS at 18:52

## 2018-10-17 RX ADMIN — FUROSEMIDE 40 MG: 10 INJECTION, SOLUTION INTRAMUSCULAR; INTRAVENOUS at 18:49

## 2018-10-17 RX ADMIN — FUROSEMIDE 40 MG: 10 INJECTION, SOLUTION INTRAMUSCULAR; INTRAVENOUS at 08:11

## 2018-10-17 RX ADMIN — ALBUMIN (HUMAN) 25 G: 0.25 INJECTION, SOLUTION INTRAVENOUS at 10:19

## 2018-10-17 RX ADMIN — Medication 2 G: at 17:17

## 2018-10-17 RX ADMIN — FERROUS SULFATE TAB 325 MG (65 MG ELEMENTAL FE) 325 MG: 325 (65 FE) TAB at 18:48

## 2018-10-17 RX ADMIN — ALBUTEROL SULFATE 2.5 MG: 2.5 SOLUTION RESPIRATORY (INHALATION) at 21:12

## 2018-10-17 RX ADMIN — SODIUM CHLORIDE 40 MG: 9 INJECTION INTRAMUSCULAR; INTRAVENOUS; SUBCUTANEOUS at 11:37

## 2018-10-17 RX ADMIN — ALBUTEROL SULFATE 2.5 MG: 2.5 SOLUTION RESPIRATORY (INHALATION) at 15:31

## 2018-10-17 RX ADMIN — FERROUS SULFATE TAB 325 MG (65 MG ELEMENTAL FE) 325 MG: 325 (65 FE) TAB at 13:12

## 2018-10-17 RX ADMIN — MAGNESIUM SULFATE IN WATER 2 G: 40 INJECTION, SOLUTION INTRAVENOUS at 11:37

## 2018-10-17 RX ADMIN — Medication 400 MG: at 21:28

## 2018-10-17 RX ADMIN — SENNOSIDES AND DOCUSATE SODIUM 2 TABLET: 8.6; 5 TABLET ORAL at 21:28

## 2018-10-17 RX ADMIN — Medication 2 G: at 00:29

## 2018-10-17 RX ADMIN — Medication 10 ML: at 21:37

## 2018-10-17 RX ADMIN — METOPROLOL SUCCINATE 25 MG: 25 TABLET, EXTENDED RELEASE ORAL at 12:37

## 2018-10-17 RX ADMIN — SPIRONOLACTONE 25 MG: 25 TABLET ORAL at 08:09

## 2018-10-17 RX ADMIN — ONDANSETRON HYDROCHLORIDE 4 MG: 2 INJECTION INTRAMUSCULAR; INTRAVENOUS at 09:22

## 2018-10-17 NOTE — PROGRESS NOTES
Shift assessment complete. Pt resting in bed. A&O x4. Respirations present, even, unlabored. Diminished on auscultation. HR regular, S1&S2 auscultated. Tele box in place. Skin appears to be intact, a few scars. Encouraged to call for help when needed. Bed in lowest position, call light within reach, side rails x3. Will continue to monitor throughout the shift.

## 2018-10-17 NOTE — PROGRESS NOTES
Problem: Mobility Impaired (Adult and Pediatric) Goal: *Acute Goals and Plan of Care (Insert Text) No goals establishes as pt. Is functionally independent and does not need skilled PT services PHYSICAL THERAPY: Initial Assessment, Discharge 10/17/2018INPATIENT: Hospital Day: 4 Payor: SELF PAY / Plan: [facility] SELF PAY / Product Type: Self Pay /  
  
NAME/AGE/GENDER: Jorge Mathew is a 28 y.o. female PRIMARY DIAGNOSIS: Sepsis ([facility] Sepsis ([facility] Sepsis ([facility] ICD-10: Treatment Diagnosis:  
 · Generalized Muscle Weakness (M62.81) Precaution/Allergies: 
Latex and Sulfa (sulfonamide antibiotics) ASSESSMENT:  
Ms. Rita Zhou presents with sepsis and demonstrates a slight decline in LE strength. B LEs are edematous below the knee and are hypersensitive to touch. She is able to perform bed mobility, transfer and ambulate in her room independently. She is aggravated that I am in room and when I explain that the Dr. Romell Mcardle my assessment she said that she \"would be happy to walk the halls especially if she didn't have to have her  all the time\". I do not see the need for any skilled PT services at this time, so we will DC her from our caseload. This section established at most recent assessment PROBLEM LIST (Impairments causing functional limitations): 1. Decreased Strength 2. Decreased Activity Tolerance INTERVENTIONS PLANNED: (Benefits and precautions of physical therapy have been discussed with the patient.) 1. no skilled intervention indicated TREATMENT PLAN: Frequency/Duration: DC PTRehabilitation Potential For Stated Goals: no goals established RECOMMENDED REHABILITATION/EQUIPMENT: (at time of discharge pending progress): Due to the lack of deficits this patient will not likely need continued skilled physical therapy after discharge. Equipment:  
? None at this time HISTORY:  
History of Present Injury/Illness (Reason for Referral): Admitted with sepsis Past Medical History/Comorbidities: Ms. Delta Acevedo  has a past medical history of Anxiety, Heart failure ([facility] Hypertension, Methamphetamine abuse ([facility] Neurological disorder, and Polysubstance abuse ([facility] Ms. Delta Acevedo  has a past surgical history that includes hx gyn and hx appendectomy. Social History/Living Environment:  
Home Environment: Private residence # Steps to Enter: 5 Rails to Enter: No 
One/Two Story Residence: Two story # of Interior Steps: 15 Interior Rails: Left Living Alone: No 
Support Systems: Friends \ neighbors Patient Expects to be Discharged to[de-identified] Private residence Current DME Used/Available at Home: None Tub or Shower Type: Tub/Shower combination Prior Level of Function/Work/Activity: 
Functionally independent with ADls, amb Dominant Side:  
      RIGHT Number of Personal Factors/Comorbidities that affect the Plan of Care: 1-2: MODERATE COMPLEXITY EXAMINATION:  
Most Recent Physical Functioning:  
Gross Assessment: 
AROM: Within functional limits(all 4s) Strength: Generally decreased, functional(all 4s grossly 3+-4/5) Sensation: Intact(all 4s/ LEs actually hypersensitive ) Posture: 
  
Balance: 
Sitting: Intact Standing: Intact Bed Mobility: 
Rolling: Independent Supine to Sit: Independent Wheelchair Mobility: 
  
Transfers: 
Sit to Stand: Independent Stand to Sit: Independent Gait: 
  
Gait Abnormalities: Decreased step clearance Distance (ft): 30 Feet (ft) Ambulation - Level of Assistance: Independent Body Structures Involved: 1. Heart 2. Lungs 3. Muscles Body Functions Affected: 1. Mental 
2. Sensory/Pain 3. Cardio 4. Respiratory 5. Movement Related Activities and Participation Affected: 1. General Tasks and Demands 2. Mobility 3. Community, Social and [place] Number of elements that affect the Plan of Care: 4+: HIGH COMPLEXITY CLINICAL PRESENTATION:  
Presentation: Stable and uncomplicated: LOW COMPLEXITY CLINICAL DECISION [place]KIN[address] [place] [address] AM-PAC 6 Clicks Basic Mobility Inpatient Short Form How much difficulty does the patient currently have. .. Unable A Lot A Little None 1. Turning over in bed (including adjusting bedclothes, sheets and blankets)? [] 1   [] 2   [] 3   [x] 4  
2. Sitting down on and standing up from a chair with arms ( e.g., wheelchair, bedside commode, etc.)   [] 1   [] 2   [] 3   [x] 4  
3. Moving from lying on back to sitting on the side of the bed? [] 1   [] 2   [] 3   [x] 4 How much help from another person does the patient currently need. .. Total A Lot A Little None 4. Moving to and from a bed to a chair (including a wheelchair)? [] 1   [] 2   [] 3   [x] 4  
5. Need to walk in hospital room? [] 1   [] 2   [] 3   [x] 4  
6. Climbing 3-5 steps with a railing? [] 1   [] 2   [x] 3   [] 4  
© 2007, Trustees of [address] [place], [place] [id number], under license to [employer]. All rights reserved Score:  Initial: 23 Most Recent: X (Date: -- ) Interpretation of Tool:  Represents activities that are increasingly more difficult (i.e. Bed mobility, Transfers, Gait). Score 24 23 22-20 19-15 14-10 9-7 6 Modifier CH CI CJ CK CL CM CN   
 
? Mobility - Walking and Moving Around:  
  - CURRENT STATUS: CI - 1%-19% impaired, limited or restricted  - GOAL STATUS: CI - 1%-19% impaired, limited or restricted  - D/C STATUS:  CI - 1%-19% impaired, limited or restricted Payor: SELF PAY / Plan: [facility] SELF PAY / Product Type: Self Pay /   
 
Medical Necessity: · No deficits and does not need skilled PT services Reason for Services/Other Comments: 
· no PT indicated. Use of outcome tool(s) and clinical judgement create a POC that gives a: Clear prediction of patient's progress: LOW COMPLEXITY  
  
 
 
 
TREATMENT:  
(In addition to Assessment/Re-Assessment sessions the following treatments were rendered) Pre-treatment Symptoms/Complaints:  States she can walk by herself Pain: Initial:  
Pain Intensity 1: 0  Post Session:  0 Assessment/Reassessment only, no treatment provided today Braces/Orthotics/Lines/Etc:  
· IV 
· telemetry · O2 Device: Nasal cannula Treatment/Session Assessment:   
· Response to Treatment:  Tolerated well. Was aggravated that I was present, but ambulated in room and sat up chair · Interdisciplinary Collaboration:  
o Physical Therapist 
o Registered Nurse · After treatment position/precautions:  
o Up in chair 
o Bed/Chair-wheels locked 
o Caregiver at bedside 
o Call light within reach 
o RN notified 
o Family at bedside · Compliance with Program/Exercises: emphasized the need to call for assistance back to bed. · Recommendations/Intent for next treatment session:  DC PT. No further PT indicated Total Treatment Duration: PT Patient Time In/Time Out Time In: 0170 Time Out: 1140 Ciarra Sotelo PT  
    
 
________________________________________________________________________________________________

## 2018-10-17 NOTE — PROGRESS NOTES
TRANSFER - OUT REPORT: 
 
Verbal report given to Yane Botello on David Drew  being transferred to [facility] with remote tele for routine progression of care Report consisted of patients Situation, Background, Assessment and  
Recommendations(SBAR). Information from the following report(s) SBAR, Kardex, ED Summary, Procedure Summary, Intake/Output, MAR, Recent Results, Med Rec Status and Cardiac Rhythm NSR/ST was reviewed with the receiving nurse. Lines:    
 
Opportunity for questions and clarification was provided. Patient transported with: 
 [employer]

## 2018-10-17 NOTE — PROGRESS NOTES
Progress Note Patient: Ness Hicks               Sex: female          MRN: [id number] YOB: 1985      Age:  28 y. o. PCP:  None Treatment Team: Attending Provider: Laura Dunbar DO; Care Manager: Tia Capellan Consulting Provider: Melly Jerome MD; Utilization Review: Shanna Ruffin RN; Care Manager: Paula Quach Consulting Provider: Oswaldo Briggs Consulting Provider: Pippa Pate MD 
Subjective:  
 
Complains of moderate, achy pain in her right lower back and hip area. Worse with activity. Better with methadone. Patient had fever today, 101F. No chest pain or abdominal pain or diarrhea or vomitings. Bilateral lower extremity edema. Somewhat improved since admission. SOB slightly better after thoracentesis. C/o nausea and vomited today. Objective:  
Physical Exam:  
Visit Vitals /74 (BP 1 Location: Right arm, BP Patient Position: At rest) Pulse (!) 123 Temp (!) 101.5 °F (38.6 °C) Resp 21 Ht 6' (1.829 m) Wt 89.4 kg (197 lb) SpO2 96% Breastfeeding? No  
BMI 26.72 kg/m² Temp (24hrs), Av.2 °F (37.3 °C), Min:97.5 °F (36.4 °C), Max:101.5 °F (38.6 °C) Oxygen Therapy O2 Sat (%): 96 % (10/17/18 1220) Pulse via Oximetry: 136 beats per minute(held tx due to increased HR) (10/17/18 1216) O2 Device: Nasal cannula (10/17/18 1216) O2 Flow Rate (L/min): 2 l/min (10/17/18 1216) FIO2 (%): 28 % (10/17/18 1216) Intake/Output Summary (Last 24 hours) at 10/17/2018 1232 Last data filed at 10/17/2018 1229 Gross per 24 hour Intake 240 ml Output 1485 ml Net -1245 ml General: Conscious, No acute distress Eyes:  JR, No pallor/icterus HENT:             Oral Mucosa is Moist, No sinus tenderness Neck:               Supple, elevated JVP Lungs:  Diminished air entry bibasilar with crackles, rt > left,  No significant wheeze/rhonchi Heart:  S1 S2 regular Abdomen: Soft, normal bowel sounds, NTND, No guarding/rigidity/rebound tend. Extremities: Dmaeon. Severe pitting pedal edema Neurologic:  AAOX3, No acute FND, Motor:  LUE: 5/5, LLE: 5/5, RUE: 5/5, RLE: 5/5 Skin:                No acute rashes Musculoskeletal: No Acute findings Psych:             Calm and appropriate now. LAB Recent Results (from the past 24 hour(s)) CBC WITH AUTOMATED DIFF Collection Time: 10/17/18  6:14 AM  
Result Value Ref Range WBC 7.4 4.3 - 11.1 K/uL  
 RBC 3.11 (L) 4.05 - 5.2 M/uL HGB 7.9 (L) 11.7 - 15.4 g/dL HCT 26.4 (L) 35.8 - 46.3 % MCV 84.9 79.6 - 97.8 FL  
 MCH 25.4 (L) 26.1 - 32.9 PG  
 MCHC 29.9 (L) 31.4 - 35.0 g/dL  
 RDW 18.8 % PLATELET 353 130 - 443 K/uL MPV 8.9 (L) 9.4 - 12.3 FL ABSOLUTE NRBC 0.00 0.0 - 0.2 K/uL  
 DF AUTOMATED NEUTROPHILS 65 43 - 78 % LYMPHOCYTES 20 13 - 44 % MONOCYTES 13 (H) 4.0 - 12.0 % EOSINOPHILS 1 0.5 - 7.8 % BASOPHILS 1 0.0 - 2.0 % IMMATURE GRANULOCYTES 1 0.0 - 5.0 %  
 ABS. NEUTROPHILS 4.8 1.7 - 8.2 K/UL  
 ABS. LYMPHOCYTES 1.5 0.5 - 4.6 K/UL  
 ABS. MONOCYTES 0.9 0.1 - 1.3 K/UL  
 ABS. EOSINOPHILS 0.1 0.0 - 0.8 K/UL  
 ABS. BASOPHILS 0.0 0.0 - 0.2 K/UL  
 ABS. IMM. GRANS. 0.1 0.0 - 0.5 K/UL METABOLIC PANEL, BASIC Collection Time: 10/17/18  6:14 AM  
Result Value Ref Range Sodium 131 (L) 136 - 145 mmol/L Potassium 3.3 (L) 3.5 - 5.1 mmol/L Chloride 90 (L) 98 - 107 mmol/L  
 CO2 36 (H) 21 - 32 mmol/L Anion gap 5 mmol/L Glucose 89 65 - 100 mg/dL BUN 13 6 - 23 MG/DL Creatinine 0.87 0.6 - 1.0 MG/DL  
 GFR est AA >60 >60 ml/min/1.73m2 GFR est non-AA >60 ml/min/1.73m2 Calcium 8.1 (L) 8.3 - 10.4 MG/DL MAGNESIUM Collection Time: 10/17/18  6:14 AM  
Result Value Ref Range Magnesium 1.6 (L) 1.8 - 2.4 mg/dL PHOSPHORUS Collection Time: 10/17/18  6:14 AM  
Result Value Ref Range Phosphorus 2.9 2.5 - 4.5 MG/DL  
HEPATIC FUNCTION PANEL  Collection Time: 10/17/18  6:14 AM  
 Result Value Ref Range Protein, total 6.3 g/dL Albumin 1.6 (L) 3.5 - 5.0 g/dL Globulin 4.7 (H) 2.3 - 3.5 g/dL A-G Ratio 0.3 Bilirubin, total 1.8 (H) 0.2 - 1.1 MG/DL Bilirubin, direct 1.2 (H) <0.4 MG/DL Alk. phosphatase 97 50 - 136 U/L  
 AST (SGOT) 34 15 - 37 U/L  
 ALT (SGPT) 39 12 - 65 U/L  
PROTHROMBIN TIME + INR Collection Time: 10/17/18  6:14 AM  
Result Value Ref Range Prothrombin time 15.1 (H) 11.5 - 14.5 sec INR 1.2 PTT Collection Time: 10/17/18  6:14 AM  
Result Value Ref Range aPTT 34.4 23.2 - 35.3 SEC No results found. No results found. All Micro Results Procedure Component Value Units Date/Time CULTURE, BLOOD [[id number]] Order Status:  Sent Specimen:  Blood CULTURE, BLOOD [[id number]] Order Status:  Sent Specimen:  Blood CULTURE, BODY FLUID Colleen Purpura [[id number]] Collected:  10/16/18 0177 Order Status:  Completed Specimen:  Thoracentesis Updated:  10/17/18 3911 Special Requests: NO SPECIAL REQUESTS     
  GRAM STAIN PENDING Culture result: NO GROWTH 1 DAY FUNGUS CULTURE AND SMEAR [[id number]] Collected:  10/16/18 3868 Order Status:  Completed Specimen:  Other Updated:  10/16/18 1126 AFB CULTURE + SMEAR W/RFLX ID FROM CULTURE [[id number]] Collected:  10/16/18 0951 Order Status:  Completed Updated:  10/16/18 1123 CULTURE, BLOOD [[id number]] Collected:  10/15/18 2233 Order Status:  Completed Specimen:  Blood Updated:  10/16/18 2291 Special Requests: RIGHT ANTECUBITAL Culture result: NO GROWTH AFTER 10 HOURS     
 CULTURE, BLOOD [[id number]] Collected:  10/15/18 2235 Order Status:  Completed Specimen:  Blood Updated:  10/16/18 1999 Special Requests: RIGHT ARM Culture result: NO GROWTH AFTER 10 HOURS     
 CULTURE, BLOOD [[id number]] Collected:  10/14/18 1250 Order Status:  Completed Specimen:  Whole Blood Updated:  10/16/18 0441 Special Requests: --     
  RIGHT 
HAND Culture result: NO GROWTH 2 DAYS     
 CULTURE, BLOOD [[id number]] Collected:  10/14/18 1251 Order Status:  Completed Specimen:  Whole Blood Updated:  10/16/18 8155 Special Requests: --     
  HAND 
LEFT Culture result: NO GROWTH 2 DAYS     
 CULTURE, BLOOD [[id number]] Collected:  10/14/18 1300 Order Status:  Canceled Specimen:  Whole Blood Current Medications Reviewed Assessment/Plan 1. Sepsis secondary to MSSA bacteremia 2. MSSA bacterial endocarditis 3. Right sacroiliac septic arthritis 4. IV heroin abuse, cocaine abuse. Patient was counseled regarding cessation. 5.  Acute on chronic systolic CHF exacerbation. 6.  Pleural effusion, right greater than left. Pulmonology consulted and patient had thoracentesis with thousand cc of fluid taken out on 10/16/2018. Transudative fluid. 7.  Hypokalemia, hypomagnesemia, electrolytes are being replaced. 8.  Hypervolemic hyponatremia, continue IV diuresis and monitor sodium levels. 9.  Nonischemic cardiomyopathy with left ventricular ejection fraction of 10-15%. Cardiology consulted for recommendations. Currently not on beta-blocker secondary to cocaine use. Currently on lisinopril, spironolactone, Lasix. 10.  Acute transaminitis likely secondary to sepsis. Monitor liver enzymes closely. 11.  Anemia of chronic disease, may be some component of iron deficiency. Started on iron supplements. 12.  Chronic methadone use. 13.  Severe hypoalbuminemia, Dietary consulted for recomm. 14. ?acute gastritis - started on PPI, zofran PRN. Keep the patient on IV cefazolin for now,repeat blood cultures today. Normal WBC count. ID consulted and following. Continue IV diuresis, monitor electrolytes, UOP, daily weights. Appreciate cardiology, ID, pulmonology recommendations. Subcu Lovenox for DVT prophylaxis. High-risk patient secondary to his medical problems. Eduardo Grier MD 
October 17, 2018

## 2018-10-17 NOTE — PROGRESS NOTES
10/16/18 2211 Dual Skin Pressure Injury Assessment Dual Skin Pressure Injury Assessment WDL Second Care Provider (Based on Facility Policy) Julien Kwan RN

## 2018-10-17 NOTE — PROGRESS NOTES
Kaela Mejia Admission Date: 10/14/2018 Daily Progress Note: 10/17/2018 The patient's chart is reviewed and the patient is discussed with the staff. 
 
  
Patient is a 28 y.o.  female seen and evaluated at the request of Dr. Justo Ragland. She was admitted 10/14 with   cc of SOB and LE edema. 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 Patient originally treated for bacteremia/endocarditis at [facility] with nafcillin ending treatment 10/22/18. Patient used IV heroin via her PICC line while at [facility] and left AMA once they discontinued her PICC line. Once discharged, patient had no follow up and has not been taking her CHF medications until last week.   
  
Over the past few weeks, she has had increased SOB and LE edema. Admits to using IV heroinPTA. No new skin lesions. She has been in icu rxed for sepsis. Cards and ID are to see the pt. And she is getting lasix. And brianne ancef for mssa endocarditis Subjective: Had thoracentesis for 1000 ml ss fluid yesterday. Has diuresed about 7100 ml since admission. Temps down and feeling better. Some blood in sputum. Current Facility-Administered Medications Medication Dose Route Frequency  NUTRITIONAL SUPPORT ELECTROLYTE PRN ORDERS   Does Not Apply PRN  
 furosemide (LASIX) injection 40 mg  40 mg IntraVENous BID  lisinopril (PRINIVIL, ZESTRIL) tablet 2.5 mg  2.5 mg Oral DAILY  enoxaparin (LOVENOX) injection 40 mg  40 mg SubCUTAneous Q24H  
 magnesium oxide (MAG-OX) tablet 400 mg  400 mg Oral TID  potassium chloride (K-DUR, KLOR-CON) SR tablet 20 mEq  20 mEq Oral BID  albuterol-ipratropium (DUO-NEB) 2.5 MG-0.5 MG/3 ML  3 mL Nebulization Q4H PRN  
 ferrous sulfate tablet 325 mg  1 Tab Oral TID WITH MEALS  
  senna-docusate (PERICOLACE) 8.6-50 mg per tablet 2 Tab  2 Tab Oral QHS  polyethylene glycol (MIRALAX) packet 17 g  17 g Oral DAILY  magnesium hydroxide (MILK OF MAGNESIA) 400 mg/5 mL oral suspension 30 mL  30 mL Oral DAILY PRN  
 sodium chloride (NS) flush 10 mL  10 mL InterCATHeter Q8H  
 sodium chloride (NS) flush 10 mL  10 mL InterCATHeter PRN  
 sodium chloride (NS) flush 5-10 mL  5-10 mL IntraVENous Q8H  
 sodium chloride (NS) flush 5-10 mL  5-10 mL IntraVENous PRN  
 acetaminophen (TYLENOL) tablet 650 mg  650 mg Oral Q4H PRN  
 spironolactone (ALDACTONE) tablet 25 mg  25 mg Oral DAILY  methadone (DOLOPHINE) tablet 35 mg  35 mg Oral DAILY  ceFAZolin (ANCEF) 2 g/20 mL in sterile water IV syringe  2 g IntraVENous Q8H Review of Systems Constitutional: negative for fever, chills, sweats Cardiovascular: negative for chest pain, palpitations, syncope; + edema Gastrointestinal:  negative for dysphagia, reflux, vomiting, diarrhea, abdominal pain, or melena Neurologic:  negative for focal weakness, numbness, headache Objective:  
 
Vitals:  
 10/16/18 2204 10/17/18 0025 10/17/18 0400 10/17/18 7152 BP: 113/83 108/75 103/73 111/68 Pulse: (!) 111 (!) 112 (!) 110 (!) 126 Resp: 20 18 20 26 Temp: 99.5 °F (37.5 °C) 99.3 °F (37.4 °C) 98.8 °F (37.1 °C) 97.5 °F (36.4 °C) SpO2: 93% 95% 96% 99% Weight:      
Height:      
 
Intake and Output:  
10/15 1901 - 10/17 0700 In: 680 [P.O.:440; I.V.:240] Out: 4575 [[facility]:[id number]] No intake/output data recorded. Physical Exam:  
Constitution:  the patient is well developed and in no acute distress on 2L 
EENMT:  Sclera clear, pupils equal, oral mucosa moist 
Respiratory: decreased BS at bases with basilar crackles Cardiovascular:  RRR without M,G,R 
Gastrointestinal: soft and non-tender; with positive bowel sounds. Musculoskeletal: warm without cyanosis. There is + lower leg edema. Skin:  no jaundice or rashes Neurologic: no gross neuro deficits Psychiatric:  alert and oriented x 3 CXR:  
 
 
 
Post R thoracentesis 10/16 LAB No results for input(s): GLUCPOC in the last 72 hours. No lab exists for component: [place] Recent Labs 10/17/18 
9820 10/16/18 
0551 10/15/18 
2233 10/14/18 
1250 WBC 7.4 8.7 7.8 12.0* HGB 7.9* 8.3* 7.9* 9.0*  
HCT 26.4* 27.9* 26.1* 30.1*  
 252 235 234 INR 1.2  --   --   --   
 
Recent Labs 10/17/18 
8875 10/16/18 
0256 10/15/18 
2233 * 131* 131*  
K 3.3* 3.1* 2.8*  
CL 90* 91* 92* CO2 36* 32 32 GLU 89 95 102* BUN 13 13 14 CREA 0.87 0.95 0.96  
MG 1.6* 1.2*  --   
CA 8.1* 8.1* 7.5* PHOS 2.9  --   --   
ALB 1.6* 1.8* 1.7* TBILI 1.8* 2.2* 1.8* ALT 39 64 68* SGOT 34 29 30 No results for input(s): PH, PCO2, PO2, HCO3 in the last 72 hours. No results for input(s): LCAD, LAC in the last 72 hours. Pleural fluid: NG 
 
Assessment:  (Medical Decision Making) Hospital Problems  Never Reviewed Codes Class Noted POA Hypoproteinemia ([facility] ICD-10-CM: E77.8 ICD-9-CM: 273.8  10/17/2018 Unknown Severe and contributing to effusion formation and pulmonary edema. Pleural effusion ICD-10-CM: J90 ICD-9-CM: 511.9  10/16/2018 Unknown Clinically better post tap and diuresis. Hypomagnesemia ICD-10-CM: E94.26 
ICD-9-CM: 275.2  10/16/2018 Unknown Rx Hypokalemia ICD-10-CM: E87.6 ICD-9-CM: 276.8  10/16/2018 Unknown Rx * (Principal) Sepsis ([facility] ICD-10-CM: A41.9 ICD-9-CM: 038.9, 995.91  10/14/2018 Unknown MSSA bacteremia ICD-10-CM: R78.81 ICD-9-CM: 790.7, 041.11  10/14/2018 Unknown Blood cx here have been negative. Septic arthritis ([facility] ICD-10-CM: M00.9 ICD-9-CM: 711.00  10/14/2018 Unknown Prolonged Q-T interval on ECG ICD-10-CM: R94.31 
ICD-9-CM: 794.31  10/14/2018 Unknown Transaminitis ICD-10-CM: R74.0 ICD-9-CM: 790.4  10/14/2018 Unknown Hyponatremia ICD-10-CM: E87.1 ICD-9-CM: 276.1  10/14/2018 Unknown Systolic CHF, acute on chronic (HCC) ICD-10-CM: Z03.93 ICD-9-CM: 428.23, 428.0  8/30/2018 Yes Severe. Heroin abuse ([facility] ICD-10-CM: F11.10 ICD-9-CM: 305.50  4/10/2018 Yes Was injecting herself via PICC at [facility]. Plan:  (Medical Decision Making) Continue last. Add albumin to help draw fluid from pleural and interstitial space. ATB per ID. CXR tomorrow. Wean oxygen as tolerated. -- 
 
More than 50% of the time documented was spent in face-to-face contact with the patient and in the care of the patient on the floor/unit where the patient is located.  
 
Angelica Duque MD

## 2018-10-17 NOTE — PROGRESS NOTES
TRANSFER - IN REPORT: 
 
Verbal report received from Cyrus galvan RN on Daylin Gasca  being received from ICU for routine progression of care Report consisted of patients Situation, Background, Assessment and  
Recommendations(SBAR). Information from the following report(s) SBAR, Kardex, ED Summary, Intake/Output was reviewed with the receiving nurse. Opportunity for questions and clarification was provided. Assessment completed upon patients arrival to unit and care assumed.

## 2018-10-17 NOTE — PROGRESS NOTES
Shift assessment complete. Patient A&O x 4. Respirations present, even and unlabored. Diminished breath sounds. Heart rate regular and elevated. Bowel sounds active. Edema noted in BLE. No distress noted, patient denies pain or discomfort. Patient resting in bed. Family at bedside. Sitter at bedside. Bed low and locked. Call light within reach. Will continue to monitor hourly throughout shift.

## 2018-10-17 NOTE — PROGRESS NOTES
[facility] CARDIOLOGY PROGRESS NOTE 
      
 
10/17/2018 11:12 AM 
 
Admit Date: 10/14/2018 Subjective: No CP, less sob now. Recent IV heroin and ongoing drug use. ROS: 
Cardiovascular:  As noted above Objective:  
  
Vitals:  
 10/17/18 0025 10/17/18 0400 10/17/18 0807 10/17/18 0940 BP: 108/75 103/73 111/68 Pulse: (!) 112 (!) 110 (!) 126 Resp: 18 20 26 20 Temp: 99.3 °F (37.4 °C) 98.8 °F (37.1 °C) 97.5 °F (36.4 °C) SpO2: 95% 96% 99% Weight:      
Height:      
 
 
Physical Exam: 
General-No Acute Distress Neck- supple, no JVD 
CV- regular rate and rhythm no MRG Lung- clear bilaterally Abd- soft, nontender, nondistended Ext- no edema bilaterally. Skin- warm and dry Data Review:  
Recent Labs 10/17/18 
6099 10/16/18 
6226 * 131*  
K 3.3* 3.1*  
MG 1.6* 1.2*  
BUN 13 13 CREA 0.87 0.95 GLU 89 95 WBC 7.4 8.7 HGB 7.9* 8.3* HCT 26.4* 27.9*  
 252 INR 1.2  --   
 
 
Assessment/Plan:  
 
Principal Problem: 
  Sepsis ([facility] (10/14/2018) On abx now. Follow, defer to primary team.  
 
Active Problems: 
  Heroin abuse ([facility] [address] (4/10/2018) Needs cessaion Systolic CHF, acute on chronic ([facility] [address] (8/30/2018) Continue meds, continue med treatment. Not candidate for lifevest or ICD. Will see Cardiology in [place] as follow up. While BB not ideal wit drug abuse, HR remains high. Will add toprol, she claims she has quit drugs. Continue IV lasix and optimize meds for SHF. Prognosis guarded overall. MSSA bacteremia (10/14/2018) On abx. Hypomagnesemia (10/16/2018) Replace and follow labs Hypokalemia (10/16/2018) Replace and follow labs We will be on standby, defer to hospitalist for HF mgmt. Continue meds for SHF. Please call as needed.   
 
 
 
Wei Barrientos DO 
10/17/2018 11:12 AM

## 2018-10-18 ENCOUNTER — APPOINTMENT (OUTPATIENT)
Dept: GENERAL RADIOLOGY | Age: 33
DRG: 871 | End: 2018-10-18
Attending: INTERNAL MEDICINE
Payer: SUBSIDIZED

## 2018-10-18 LAB
ALBUMIN SERPL-MCNC: 2.3 G/DL (ref 3.5–5)
ALBUMIN/GLOB SERPL: 0.5 {RATIO}
ALP SERPL-CCNC: 100 U/L (ref 50–136)
ALT SERPL-CCNC: 31 U/L (ref 12–65)
ANION GAP SERPL CALC-SCNC: 8 MMOL/L
AST SERPL-CCNC: 36 U/L (ref 15–37)
BACTERIA SPEC CULT: NORMAL
BASOPHILS # BLD: 0 K/UL (ref 0–0.2)
BASOPHILS NFR BLD: 1 % (ref 0–2)
BILIRUB SERPL-MCNC: 2.5 MG/DL (ref 0.2–1.1)
BUN SERPL-MCNC: 19 MG/DL (ref 6–23)
CALCIUM SERPL-MCNC: 8.6 MG/DL (ref 8.3–10.4)
CHLORIDE SERPL-SCNC: 88 MMOL/L (ref 98–107)
CO2 SERPL-SCNC: 32 MMOL/L (ref 21–32)
CREAT SERPL-MCNC: 1.01 MG/DL (ref 0.6–1)
DIFFERENTIAL METHOD BLD: ABNORMAL
EOSINOPHIL # BLD: 0 K/UL (ref 0–0.8)
EOSINOPHIL NFR BLD: 0 % (ref 0.5–7.8)
ERYTHROCYTE [DISTWIDTH] IN BLOOD BY AUTOMATED COUNT: 19 %
GLOBULIN SER CALC-MCNC: 4.7 G/DL (ref 2.3–3.5)
GLUCOSE SERPL-MCNC: 75 MG/DL (ref 65–100)
GRAM STN SPEC: NORMAL
GRAM STN SPEC: NORMAL
HCT VFR BLD AUTO: 27 % (ref 35.8–46.3)
HGB BLD-MCNC: 7.9 G/DL (ref 11.7–15.4)
IMM GRANULOCYTES # BLD: 0.1 K/UL (ref 0–0.5)
IMM GRANULOCYTES NFR BLD AUTO: 1 % (ref 0–5)
LYMPHOCYTES # BLD: 1.7 K/UL (ref 0.5–4.6)
LYMPHOCYTES NFR BLD: 21 % (ref 13–44)
MAGNESIUM SERPL-MCNC: 2 MG/DL (ref 1.8–2.4)
MCH RBC QN AUTO: 25.6 PG (ref 26.1–32.9)
MCHC RBC AUTO-ENTMCNC: 29.3 G/DL (ref 31.4–35)
MCV RBC AUTO: 87.7 FL (ref 79.6–97.8)
MONOCYTES # BLD: 0.9 K/UL (ref 0.1–1.3)
MONOCYTES NFR BLD: 11 % (ref 4–12)
NEUTS SEG # BLD: 5.4 K/UL (ref 1.7–8.2)
NEUTS SEG NFR BLD: 67 % (ref 43–78)
NRBC # BLD: 0.03 K/UL (ref 0–0.2)
PHOSPHATE SERPL-MCNC: 3.5 MG/DL (ref 2.5–4.5)
PLATELET # BLD AUTO: 321 K/UL (ref 150–450)
PMV BLD AUTO: 9.7 FL (ref 9.4–12.3)
POTASSIUM SERPL-SCNC: 4.2 MMOL/L (ref 3.5–5.1)
PROT SERPL-MCNC: 7 G/DL
RBC # BLD AUTO: 3.08 M/UL (ref 4.05–5.2)
SERVICE CMNT-IMP: NORMAL
SODIUM SERPL-SCNC: 128 MMOL/L (ref 136–145)
WBC # BLD AUTO: 8 K/UL (ref 4.3–11.1)

## 2018-10-18 PROCEDURE — 99232 SBSQ HOSP IP/OBS MODERATE 35: CPT | Performed by: INTERNAL MEDICINE

## 2018-10-18 PROCEDURE — 74011250637 HC RX REV CODE- 250/637: Performed by: FAMILY MEDICINE

## 2018-10-18 PROCEDURE — 74011250637 HC RX REV CODE- 250/637: Performed by: HOSPITALIST

## 2018-10-18 PROCEDURE — 94760 N-INVAS EAR/PLS OXIMETRY 1: CPT

## 2018-10-18 PROCEDURE — 94640 AIRWAY INHALATION TREATMENT: CPT

## 2018-10-18 PROCEDURE — 74011250636 HC RX REV CODE- 250/636: Performed by: HOSPITALIST

## 2018-10-18 PROCEDURE — 80053 COMPREHEN METABOLIC PANEL: CPT

## 2018-10-18 PROCEDURE — 80307 DRUG TEST PRSMV CHEM ANLYZR: CPT

## 2018-10-18 PROCEDURE — 74011250636 HC RX REV CODE- 250/636: Performed by: INTERNAL MEDICINE

## 2018-10-18 PROCEDURE — 83735 ASSAY OF MAGNESIUM: CPT

## 2018-10-18 PROCEDURE — 74011250637 HC RX REV CODE- 250/637: Performed by: INTERNAL MEDICINE

## 2018-10-18 PROCEDURE — P9047 ALBUMIN (HUMAN), 25%, 50ML: HCPCS | Performed by: INTERNAL MEDICINE

## 2018-10-18 PROCEDURE — 74011250636 HC RX REV CODE- 250/636: Performed by: FAMILY MEDICINE

## 2018-10-18 PROCEDURE — 71045 X-RAY EXAM CHEST 1 VIEW: CPT

## 2018-10-18 PROCEDURE — 74011000250 HC RX REV CODE- 250: Performed by: INTERNAL MEDICINE

## 2018-10-18 PROCEDURE — 65270000029 HC RM PRIVATE

## 2018-10-18 PROCEDURE — 85025 COMPLETE CBC W/AUTO DIFF WBC: CPT

## 2018-10-18 PROCEDURE — 36415 COLL VENOUS BLD VENIPUNCTURE: CPT

## 2018-10-18 PROCEDURE — 77010033678 HC OXYGEN DAILY

## 2018-10-18 PROCEDURE — 84100 ASSAY OF PHOSPHORUS: CPT

## 2018-10-18 RX ORDER — POTASSIUM CHLORIDE 20 MEQ/1
20 TABLET, EXTENDED RELEASE ORAL DAILY
Status: DISCONTINUED | OUTPATIENT
Start: 2018-10-19 | End: 2018-10-25 | Stop reason: HOSPADM

## 2018-10-18 RX ORDER — FUROSEMIDE 10 MG/ML
40 INJECTION INTRAMUSCULAR; INTRAVENOUS DAILY
Status: DISCONTINUED | OUTPATIENT
Start: 2018-10-18 | End: 2018-10-19

## 2018-10-18 RX ORDER — METHADONE HYDROCHLORIDE 10 MG/1
30 TABLET ORAL 2 TIMES DAILY
Status: DISCONTINUED | OUTPATIENT
Start: 2018-10-18 | End: 2018-10-25 | Stop reason: HOSPADM

## 2018-10-18 RX ORDER — SPIRONOLACTONE 25 MG/1
50 TABLET ORAL 2 TIMES DAILY
Status: DISCONTINUED | OUTPATIENT
Start: 2018-10-18 | End: 2018-10-25 | Stop reason: HOSPADM

## 2018-10-18 RX ORDER — ALBUMIN HUMAN 250 G/1000ML
25 SOLUTION INTRAVENOUS 2 TIMES DAILY
Status: COMPLETED | OUTPATIENT
Start: 2018-10-18 | End: 2018-10-18

## 2018-10-18 RX ADMIN — Medication 2 G: at 00:24

## 2018-10-18 RX ADMIN — PANTOPRAZOLE SODIUM 40 MG: 40 TABLET, DELAYED RELEASE ORAL at 09:10

## 2018-10-18 RX ADMIN — ACETAMINOPHEN 650 MG: 325 TABLET, FILM COATED ORAL at 15:35

## 2018-10-18 RX ADMIN — Medication 2 G: at 09:08

## 2018-10-18 RX ADMIN — ALBUMIN (HUMAN) 25 G: 0.25 INJECTION, SOLUTION INTRAVENOUS at 17:13

## 2018-10-18 RX ADMIN — ENOXAPARIN SODIUM 40 MG: 40 INJECTION, SOLUTION INTRAVENOUS; SUBCUTANEOUS at 09:23

## 2018-10-18 RX ADMIN — Medication 400 MG: at 17:12

## 2018-10-18 RX ADMIN — METHADONE HYDROCHLORIDE 35 MG: 10 TABLET ORAL at 09:07

## 2018-10-18 RX ADMIN — METOPROLOL SUCCINATE 25 MG: 25 TABLET, EXTENDED RELEASE ORAL at 09:09

## 2018-10-18 RX ADMIN — Medication 10 ML: at 21:37

## 2018-10-18 RX ADMIN — Medication 400 MG: at 09:09

## 2018-10-18 RX ADMIN — Medication 10 ML: at 06:05

## 2018-10-18 RX ADMIN — FERROUS SULFATE TAB 325 MG (65 MG ELEMENTAL FE) 325 MG: 325 (65 FE) TAB at 09:09

## 2018-10-18 RX ADMIN — ONDANSETRON HYDROCHLORIDE 4 MG: 2 INJECTION INTRAMUSCULAR; INTRAVENOUS at 09:16

## 2018-10-18 RX ADMIN — ONDANSETRON HYDROCHLORIDE 4 MG: 2 INJECTION INTRAMUSCULAR; INTRAVENOUS at 17:13

## 2018-10-18 RX ADMIN — SPIRONOLACTONE 50 MG: 25 TABLET ORAL at 17:11

## 2018-10-18 RX ADMIN — POTASSIUM CHLORIDE 20 MEQ: 20 TABLET, EXTENDED RELEASE ORAL at 09:10

## 2018-10-18 RX ADMIN — SPIRONOLACTONE 50 MG: 25 TABLET ORAL at 10:56

## 2018-10-18 RX ADMIN — SPIRONOLACTONE 25 MG: 25 TABLET ORAL at 09:08

## 2018-10-18 RX ADMIN — LISINOPRIL 2.5 MG: 5 TABLET ORAL at 09:09

## 2018-10-18 RX ADMIN — METHADONE HYDROCHLORIDE 30 MG: 10 TABLET ORAL at 17:12

## 2018-10-18 RX ADMIN — METOPROLOL SUCCINATE 25 MG: 25 TABLET, EXTENDED RELEASE ORAL at 21:30

## 2018-10-18 RX ADMIN — ONDANSETRON HYDROCHLORIDE 4 MG: 2 INJECTION INTRAMUSCULAR; INTRAVENOUS at 15:35

## 2018-10-18 RX ADMIN — Medication 10 ML: at 00:25

## 2018-10-18 RX ADMIN — SENNOSIDES AND DOCUSATE SODIUM 2 TABLET: 8.6; 5 TABLET ORAL at 21:29

## 2018-10-18 RX ADMIN — FUROSEMIDE 40 MG: 10 INJECTION, SOLUTION INTRAMUSCULAR; INTRAVENOUS at 09:17

## 2018-10-18 RX ADMIN — ALBUTEROL SULFATE 2.5 MG: 2.5 SOLUTION RESPIRATORY (INHALATION) at 11:08

## 2018-10-18 RX ADMIN — Medication 400 MG: at 21:30

## 2018-10-18 RX ADMIN — ALBUMIN (HUMAN) 25 G: 0.25 INJECTION, SOLUTION INTRAVENOUS at 11:04

## 2018-10-18 RX ADMIN — Medication 2 G: at 17:13

## 2018-10-18 RX ADMIN — ALBUTEROL SULFATE 2.5 MG: 2.5 SOLUTION RESPIRATORY (INHALATION) at 08:05

## 2018-10-18 NOTE — PROGRESS NOTES
Progress Note Patient: Sarahi Ovalle               Sex: female          MRN: [id number] YOB: 1985      Age:  28 y. o. PCP:  None Treatment Team: Attending Provider: Drea Becerril DO; Care Manager: Zandra Sanderson Consulting Provider: Dagoberto Dobbins MD; Utilization Review: Lynne Walker RN; Care Manager: Nina Miranda Consulting Provider: Thomes Holstein Consulting Provider: Golden Garcia MD 
Subjective:  
 
Patient complains of more shortness of breath and increased pain all over, chest and the back. No fevers overnight. Still has some nausea but no vomitings today. Objective:  
Physical Exam:  
Visit Vitals BP 96/77 (BP 1 Location: Right arm, BP Patient Position: At rest;Sitting) Pulse 100 Temp 97.7 °F (36.5 °C) Resp 20 Ht 6' (1.829 m) Wt 86.5 kg (190 lb 9.6 oz) SpO2 98% Breastfeeding? No  
BMI 25.85 kg/m² Temp (24hrs), Av.2 °F (36.8 °C), Min:97.7 °F (36.5 °C), Max:98.7 °F (37.1 °C) Oxygen Therapy O2 Sat (%): 98 % (10/18/18 1143) Pulse via Oximetry: 100 beats per minute (10/18/18 1110) O2 Device: Nasal cannula (10/18/18 1110) O2 Flow Rate (L/min): 1 l/min (10/18/18 1110) FIO2 (%): 24 % (10/17/18 2112) Intake/Output Summary (Last 24 hours) at 10/18/2018 1434 Last data filed at 10/18/2018 1114 Gross per 24 hour Intake 150 ml Output 1200 ml Net -1050 ml General: Conscious, No acute distress Eyes:  JR, No pallor/icterus HENT:             Oral Mucosa is Moist, No sinus tenderness Neck:               Supple, elevated JVP Lungs:  Diminished air entry bibasilar with crackles, rt > left,  No significant wheeze/rhonchi Heart:  S1 S2 regular. ..mild left anterior chest wall tenderness. Abdomen: Soft, normal bowel sounds, NTND, No guarding/rigidity/rebound tend. Extremities: Dameon. Severe pitting pedal edema Neurologic:  AAOX3, No acute FND, Motor:  LUE: 5/5, LLE: 5/5, RUE: 5/5, RLE: 5/5 Skin:                No acute rashes Musculoskeletal: No Acute findings Psych:             Calm and appropriate now. LAB Recent Results (from the past 24 hour(s)) METABOLIC PANEL, COMPREHENSIVE Collection Time: 10/18/18  5:53 AM  
Result Value Ref Range Sodium 128 (L) 136 - 145 mmol/L Potassium 4.2 3.5 - 5.1 mmol/L Chloride 88 (L) 98 - 107 mmol/L  
 CO2 32 21 - 32 mmol/L Anion gap 8 mmol/L Glucose 75 65 - 100 mg/dL BUN 19 6 - 23 MG/DL Creatinine 1.01 (H) 0.6 - 1.0 MG/DL  
 GFR est AA >60 >60 ml/min/1.73m2 GFR est non-AA >60 ml/min/1.73m2 Calcium 8.6 8.3 - 10.4 MG/DL Bilirubin, total 2.5 (H) 0.2 - 1.1 MG/DL  
 ALT (SGPT) 31 12 - 65 U/L  
 AST (SGOT) 36 15 - 37 U/L Alk. phosphatase 100 50 - 136 U/L Protein, total 7.0 g/dL Albumin 2.3 (L) 3.5 - 5.0 g/dL Globulin 4.7 (H) 2.3 - 3.5 g/dL A-G Ratio 0.5    
CBC WITH AUTOMATED DIFF Collection Time: 10/18/18  5:53 AM  
Result Value Ref Range WBC 8.0 4.3 - 11.1 K/uL  
 RBC 3.08 (L) 4.05 - 5.2 M/uL HGB 7.9 (L) 11.7 - 15.4 g/dL HCT 27.0 (L) 35.8 - 46.3 % MCV 87.7 79.6 - 97.8 FL  
 MCH 25.6 (L) 26.1 - 32.9 PG  
 MCHC 29.3 (L) 31.4 - 35.0 g/dL  
 RDW 19.0 % PLATELET 460 378 - 166 K/uL MPV 9.7 9.4 - 12.3 FL ABSOLUTE NRBC 0.03 0.0 - 0.2 K/uL  
 DF AUTOMATED NEUTROPHILS 67 43 - 78 % LYMPHOCYTES 21 13 - 44 % MONOCYTES 11 4.0 - 12.0 % EOSINOPHILS 0 (L) 0.5 - 7.8 % BASOPHILS 1 0.0 - 2.0 % IMMATURE GRANULOCYTES 1 0.0 - 5.0 %  
 ABS. NEUTROPHILS 5.4 1.7 - 8.2 K/UL  
 ABS. LYMPHOCYTES 1.7 0.5 - 4.6 K/UL  
 ABS. MONOCYTES 0.9 0.1 - 1.3 K/UL  
 ABS. EOSINOPHILS 0.0 0.0 - 0.8 K/UL  
 ABS. BASOPHILS 0.0 0.0 - 0.2 K/UL  
 ABS. IMM. GRANS. 0.1 0.0 - 0.5 K/UL MAGNESIUM Collection Time: 10/18/18  5:53 AM  
Result Value Ref Range Magnesium 2.0 1.8 - 2.4 mg/dL PHOSPHORUS Collection Time: 10/18/18  5:53 AM  
Result Value Ref Range  Phosphorus 3.5 2.5 - 4.5 MG/DL  
 
 
 Xr Chest Sngl V Result Date: 10/18/2018 AP chest radiograph History: CHF, 33 years Female Comparison: Chest radiograph October 16, 2018 Findings:   Partially obscured cardiomediastinal silhouette. Persistent low lung volumes. Persistent moderate right and trace left pleural effusions with associated bibasilar atelectasis and or consolidation. Probable mild interstitial edema unchanged. No evidence of pneumothorax. Visualized soft tissue and osseous structures otherwise unremarkable. Impression:  No significant interval change. Xr Chest Sngl V Result Date: 10/18/2018 Impression:  No significant interval change. All Micro Results Procedure Component Value Units Date/Time CULTURE, BLOOD [[id number]] Collected:  10/17/18 1319 Order Status:  Completed Specimen:  Blood Updated:  10/18/18 1130 Special Requests: --     
  RIGHT 
HAND Culture result: NO GROWTH AFTER 21 HOURS     
 CULTURE, BLOOD [[id number]] Collected:  10/17/18 1321 Order Status:  Completed Specimen:  Blood Updated:  10/18/18 1130 Special Requests: --     
  RIGHT 
HAND Culture result: NO GROWTH AFTER 21 HOURS     
 CULTURE, BLOOD [[id number]] Collected:  10/15/18 2233 Order Status:  Completed Specimen:  Blood Updated:  10/18/18 1130 Special Requests: RIGHT ANTECUBITAL Culture result: NO GROWTH 3 DAYS     
 CULTURE, BLOOD [[id number]] Collected:  10/15/18 2235 Order Status:  Completed Specimen:  Blood Updated:  10/18/18 1130 Special Requests: RIGHT ARM Culture result: NO GROWTH 3 DAYS     
 CULTURE, BLOOD [[id number]] Collected:  10/14/18 1250 Order Status:  Completed Specimen:  Whole Blood Updated:  10/18/18 1130 Special Requests: --     
  RIGHT 
HAND Culture result: NO GROWTH 4 DAYS     
 CULTURE, BLOOD [[id number]] Collected:  10/14/18 1251 Order Status:  Completed Specimen:  Whole Blood Updated:  10/18/18 1130   Special Requests: --     
  HAND 
LEFT 
 Culture result: NO GROWTH 4 DAYS     
 CULTURE, BODY FLUID W Klarissa Morgan [[id number]] Collected:  10/16/18 3232 Order Status:  Completed Specimen:  Thoracentesis Updated:  10/18/18 5916 Special Requests: NO SPECIAL REQUESTS     
  GRAM STAIN 0 TO 10 WBC'S/OIF  
   NO DEFINITE ORGANISM SEEN Culture result: NO GROWTH 2 DAYS     
 AFB CULTURE + SMEAR W/RFLX ID FROM CULTURE [[id number]] Collected:  10/16/18 0951 Order Status:  Completed Specimen:  Miscellaneous sample Updated:  10/17/18 1736 Source THORACENTESIS Comment: RIGHT Corrected on 10/16 AT 0951: This is a correction to the medical record of the test results previously reported as THORACENTESIS 
  
  
  AFB Specimen processing Concentration Acid Fast Smear NEGATIVE Comment: (NOTE) Performed At: [address] [id number] Ivone Aggarwal MD [id number] Acid Fast Culture PENDING  
 FUNGUS CULTURE AND SMEAR [[id number]] Collected:  10/16/18 2334 Order Status:  Completed Specimen:  Other from Miscellaneous sample Updated:  10/17/18 1537 Source THORACENTESIS Comment: RIGHT Corrected on 10/16 AT 0951: This is a correction to the medical record of the test results previously reported as THORACENTESIS Fungus stain Direct Inoculation Fungus (Mycology) Culture Other source received Comment: (NOTE) Performed At: [address] [id number] Ivone Aggarwal MD [id number] CULTURE, BLOOD [[id number]] Collected:  10/14/18 1300 Order Status:  Canceled Specimen:  Whole Blood Current Medications Reviewed Assessment/Plan 1. Sepsis secondary to MSSA bacteremia 2. MSSA bacterial endocarditis 3. Right sacroiliac septic arthritis 4. IV heroin abuse, cocaine abuse. Patient was counseled regarding cessation. Increase methadone to 30 mg BID for pain control. 5.  Acute on chronic systolic CHF exacerbation. 6.  Pleural effusion, right greater than left. Pulmonology consulted and patient had thoracentesis with thousand cc of fluid taken out on 10/16/2018. Transudative fluid. 7.  Hypokalemia, hypomagnesemia, electrolytes are being replaced. 8.  Hypervolemic hyponatremia, decrease IV lasix to once a day, increase spironolactone to 50mg BID 9. Nonischemic cardiomyopathy with left ventricular ejection fraction of 10-15%. Cardiology consulted for recommendations. Currently not on beta-blocker secondary to cocaine use. Currently on lisinopril, spironolactone, Lasix. 10.  Acute transaminitis likely secondary to sepsis. Monitor liver enzymes closely. 11.  Anemia of chronic disease, may be some component of iron deficiency. Started on iron supplements. 12.  Chronic methadone use. 13.  Severe hypoalbuminemia, Dietary consulted for recomm. 14. ?acute gastritis - started on PPI, zofran PRN. Keep the patient on IV cefazolin for now, f/u repeat blood cultures. Normal WBC count. ID consulted and following. Duration and final day of abx to be determined by ID. Continue IV diuresis, monitor electrolytes, UOP, daily weights. Appreciate cardiology, ID, pulmonology recommendations. Subcu Lovenox for DVT prophylaxis. High-risk patient secondary to his medical problems. Henok Monteiro MD 
October 18, 2018

## 2018-10-18 NOTE — PROGRESS NOTES
Shift assessment complete. Patient A&0 x 4. Respirations present even and unlabored. Breath sounds diminished. Tele monitor on. Bowel sounds active in all 4 quadrants. Edema present in in BLE. Sitter and family at bedside. Bed low and locked. Call light within reach. Will continue to monitor hourly throughout shift.

## 2018-10-18 NOTE — PROGRESS NOTES
Zion Gonzales Admission Date: 10/14/2018 Daily Progress Note: 10/18/2018 The patient's chart is reviewed and the patient is discussed with the staff. 
 
  
Patient is a 28 y.o.  female seen and evaluated at the request of Dr. Trevin Ojeda. She was admitted 10/14 with   cc of SOB and LE edema. 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 Patient originally treated for bacteremia/endocarditis at [facility] [place] with nafcillin ending treatment 10/22/18. Patient used IV heroin via her PICC line while at [facility] [place] and left AMA once they discontinued her PICC line. Once discharged, patient had no follow up and has not been taking her CHF medications until last week.   
  
Over the past few weeks, she has had increased SOB and LE edema. Admits to using IV heroinPTA. No new skin lesions. She has been in icu rxed for sepsis. Cards and ID are to see the pt. And she is getting lasix. And brianne ancef for mssa endocarditis Had thoracentesis for 1000 ml ss fluid on 10/16/18 Subjective:  
 
Has diuresed about 7.9 L  admission. Temp overall down and last temp was 101 yesterday about noon. All cultures still negative. Feels unwell still. Not coughing. Current Facility-Administered Medications Medication Dose Route Frequency  albuterol (PROVENTIL VENTOLIN) nebulizer solution 2.5 mg  2.5 mg Nebulization QID RT  
 ondansetron (ZOFRAN) injection 4 mg  4 mg IntraVENous Q4H PRN  pantoprazole (PROTONIX) tablet 40 mg  40 mg Oral ACB  metoprolol succinate (TOPROL-XL) XL tablet 25 mg  25 mg Oral BID  
 NUTRITIONAL SUPPORT ELECTROLYTE PRN ORDERS   Does Not Apply PRN  
 furosemide (LASIX) injection 40 mg  40 mg IntraVENous BID  lisinopril (PRINIVIL, ZESTRIL) tablet 2.5 mg  2.5 mg Oral DAILY  enoxaparin (LOVENOX) injection 40 mg  40 mg SubCUTAneous Q24H  
 magnesium oxide (MAG-OX) tablet 400 mg  400 mg Oral TID  potassium chloride (K-DUR, KLOR-CON) SR tablet 20 mEq  20 mEq Oral BID  ferrous sulfate tablet 325 mg  1 Tab Oral TID WITH MEALS  senna-docusate (PERICOLACE) 8.6-50 mg per tablet 2 Tab  2 Tab Oral QHS  polyethylene glycol (MIRALAX) packet 17 g  17 g Oral DAILY  magnesium hydroxide (MILK OF MAGNESIA) 400 mg/5 mL oral suspension 30 mL  30 mL Oral DAILY PRN  
 sodium chloride (NS) flush 10 mL  10 mL InterCATHeter Q8H  
 sodium chloride (NS) flush 10 mL  10 mL InterCATHeter PRN  
 sodium chloride (NS) flush 5-10 mL  5-10 mL IntraVENous Q8H  
 sodium chloride (NS) flush 5-10 mL  5-10 mL IntraVENous PRN  
 acetaminophen (TYLENOL) tablet 650 mg  650 mg Oral Q4H PRN  
 spironolactone (ALDACTONE) tablet 25 mg  25 mg Oral DAILY  methadone (DOLOPHINE) tablet 35 mg  35 mg Oral DAILY  ceFAZolin (ANCEF) 2 g/20 mL in sterile water IV syringe  2 g IntraVENous Q8H Review of Systems Constitutional: negative for fever, chills, sweats Cardiovascular: negative for chest pain, palpitations, syncope; + edema Gastrointestinal:  negative for dysphagia, reflux, vomiting, diarrhea, abdominal pain, or melena Neurologic:  negative for focal weakness, numbness, headache Objective:  
 
Vitals:  
 10/17/18 2112 10/17/18 2325 10/18/18 0434 10/18/18 0805 BP:  116/63 101/65 Pulse: (!) 111 (!) 103 (!) 102 Resp:  20 20 Temp:  98.7 °F (37.1 °C) 98.4 °F (36.9 °C) SpO2: 96% 98% 97% 92% Weight:      
Height:      
 
Intake and Output:  
10/16 1901 - 10/18 0700 In: -  
Out: [address] No intake/output data recorded. Physical Exam:  
Constitution:  the patient is well developed and in no acute distress on 2L (does not use at home) EENMT:  Sclera clear, pupils equal, oral mucosa moist 
Respiratory: decreased BS at bases with basilar crackles Cardiovascular:  RRR without M,G,R 
Gastrointestinal: soft and non-tender; with positive bowel sounds. Musculoskeletal: warm without cyanosis. There is +2  lower leg edema. Skin:  no jaundice or rashes Neurologic: no gross neuro deficits Psychiatric:  alert and oriented x 3 CXR: pending today Older x-ray as per below Post R thoracentesis 10/16 LAB No results for input(s): GLUCPOC in the last 72 hours. No lab exists for component: [place] Recent Labs 10/18/18 
0976 10/17/18 
4395 10/16/18 
9524 10/15/18 
2233 WBC 8.0 7.4 8.7 7.8 HGB 7.9* 7.9* 8.3* 7.9*  
HCT 27.0* 26.4* 27.9* 26.1*  
 306 252 235 INR  --  1.2  --   --   
 
Recent Labs 10/18/18 
7237 10/17/18 
5262 10/16/18 
7622 * 131* 131* K 4.2 3.3* 3.1*  
CL 88* 90* 91* CO2 32 36* 32  
GLU 75 89 95 BUN 19 13 13 CREA 1.01* 0.87 0.95  
MG 2.0 1.6* 1.2*  
CA 8.6 8.1* 8.1*  
PHOS 3.5 2.9  --   
ALB 2.3* 1.6* 1.8* TBILI 2.5* 1.8* 2.2* ALT 31 39 64 SGOT 36 34 29 No results for input(s): PH, PCO2, PO2, HCO3 in the last 72 hours. No results for input(s): LCAD, LAC in the last 72 hours. Pleural fluid: NG 
 
Assessment:  (Medical Decision Making) Hospital Problems  Never Reviewed Codes Class Noted POA Hypoproteinemia ([facility] [address] ICD-10-CM: E77.8 ICD-9-CM: 273.8  10/17/2018 Unknown Severe and contributing to effusion formation and pulmonary edema. Pleural effusion ICD-10-CM: J90 ICD-9-CM: 511.9  10/16/2018 Unknown Clinically better post tap and diuresis -- exudative with noted cytology negative and culture are negative. Hypomagnesemia ICD-10-CM: M22.83 
ICD-9-CM: 275.2  10/16/2018 Unknown Rx Hypokalemia ICD-10-CM: E87.6 ICD-9-CM: 276.8  10/16/2018 Unknown Rx * (Principal) Sepsis ([facility] [address] ICD-10-CM: A41.9 ICD-9-CM: 038.9, 995.91  10/14/2018 Unknown MSSA bacteremia ICD-10-CM: R78.81 ICD-9-CM: 790.7, 041.11  10/14/2018 Unknown Blood cx here have been negative. Septic arthritis ([facility] [address] ICD-10-CM: M00.9 ICD-9-CM: 711.00  10/14/2018 Unknown Prolonged Q-T interval on ECG ICD-10-CM: R94.31 
ICD-9-CM: 794.31  10/14/2018 Unknown Transaminitis ICD-10-CM: R74.0 ICD-9-CM: 790.4  10/14/2018 Unknown Hyponatremia ICD-10-CM: E87.1 ICD-9-CM: 276.1  10/14/2018 Unknown Sodium is lower Systolic CHF, acute on chronic (HCC) ICD-10-CM: M44.12 ICD-9-CM: 428.23, 428.0  8/30/2018 Yes Severe. [facility] ([facility] ICD-10-CM: F11.10 ICD-9-CM: 305.50  4/10/2018 Yes Was injecting herself via PICC at [facility]. Plan:  (Medical Decision Making) --continue albumin and lasix --keep legs up 
--ATB per ID. F/u new cultures 
--CXR still pending. . 
--Wean oxygen as tolerated but may end up needing for home. --continue remaining treatment. More than 50% of the time documented was spent in face-to-face contact with the patient and in the care of the patient on the floor/unit where the patient is located.  
 
Scotty Urban MD

## 2018-10-18 NOTE — ROUTINE PROCESS
CHf teaching completed to pt. Planner/scale @ BS: pass post test. Emphasis to report worsening daily WTs,dyspnea,  edema, generalized weakness, Cardiac low salt diet/ FR. Will F/U with Cardiologist in [place] and [facility] in [place]. Currently going through withdrawals. Positive reinforcement given. Pt verbalize understanding; 1hr total 
 
F/U with free clinic [facility] and [place]

## 2018-10-19 LAB
ALBUMIN SERPL-MCNC: 2.5 G/DL (ref 3.5–5)
ALBUMIN/GLOB SERPL: 0.6 {RATIO}
ALP SERPL-CCNC: 95 U/L (ref 50–136)
ALT SERPL-CCNC: 22 U/L (ref 12–65)
ANION GAP SERPL CALC-SCNC: 9 MMOL/L
AST SERPL-CCNC: 35 U/L (ref 15–37)
BACTERIA SPEC CULT: NORMAL
BACTERIA SPEC CULT: NORMAL
BASOPHILS # BLD: 0.1 K/UL (ref 0–0.2)
BASOPHILS NFR BLD: 1 % (ref 0–2)
BILIRUB DIRECT SERPL-MCNC: 1.8 MG/DL
BILIRUB SERPL-MCNC: 2.5 MG/DL (ref 0.2–1.1)
BUN SERPL-MCNC: 25 MG/DL (ref 6–23)
CALCIUM SERPL-MCNC: 8.4 MG/DL (ref 8.3–10.4)
CHLORIDE SERPL-SCNC: 87 MMOL/L (ref 98–107)
CO2 SERPL-SCNC: 36 MMOL/L (ref 21–32)
CREAT SERPL-MCNC: 1.05 MG/DL (ref 0.6–1)
DIFFERENTIAL METHOD BLD: ABNORMAL
EOSINOPHIL # BLD: 0.1 K/UL (ref 0–0.8)
EOSINOPHIL NFR BLD: 1 % (ref 0.5–7.8)
ERYTHROCYTE [DISTWIDTH] IN BLOOD BY AUTOMATED COUNT: 19.2 %
GLOBULIN SER CALC-MCNC: 4.3 G/DL (ref 2.3–3.5)
GLUCOSE SERPL-MCNC: 77 MG/DL (ref 65–100)
HCT VFR BLD AUTO: 29 % (ref 35.8–46.3)
HGB BLD-MCNC: 8.5 G/DL (ref 11.7–15.4)
IMM GRANULOCYTES # BLD: 0.1 K/UL (ref 0–0.5)
IMM GRANULOCYTES NFR BLD AUTO: 1 % (ref 0–5)
LYMPHOCYTES # BLD: 1.4 K/UL (ref 0.5–4.6)
LYMPHOCYTES NFR BLD: 14 % (ref 13–44)
MAGNESIUM SERPL-MCNC: 1.9 MG/DL (ref 1.8–2.4)
MCH RBC QN AUTO: 25.4 PG (ref 26.1–32.9)
MCHC RBC AUTO-ENTMCNC: 29.3 G/DL (ref 31.4–35)
MCV RBC AUTO: 86.6 FL (ref 79.6–97.8)
MONOCYTES # BLD: 1.1 K/UL (ref 0.1–1.3)
MONOCYTES NFR BLD: 11 % (ref 4–12)
NEUTS SEG # BLD: 7.4 K/UL (ref 1.7–8.2)
NEUTS SEG NFR BLD: 73 % (ref 43–78)
NRBC # BLD: 0.02 K/UL (ref 0–0.2)
PHOSPHATE SERPL-MCNC: 2.4 MG/DL (ref 2.5–4.5)
PLATELET # BLD AUTO: 360 K/UL (ref 150–450)
PMV BLD AUTO: 9.6 FL (ref 9.4–12.3)
POTASSIUM SERPL-SCNC: 4.2 MMOL/L (ref 3.5–5.1)
PROT SERPL-MCNC: 6.8 G/DL
RBC # BLD AUTO: 3.35 M/UL (ref 4.05–5.2)
SERVICE CMNT-IMP: NORMAL
SERVICE CMNT-IMP: NORMAL
SODIUM SERPL-SCNC: 132 MMOL/L (ref 136–145)
WBC # BLD AUTO: 10.1 K/UL (ref 4.3–11.1)

## 2018-10-19 PROCEDURE — 84100 ASSAY OF PHOSPHORUS: CPT

## 2018-10-19 PROCEDURE — 36415 COLL VENOUS BLD VENIPUNCTURE: CPT

## 2018-10-19 PROCEDURE — 99232 SBSQ HOSP IP/OBS MODERATE 35: CPT | Performed by: INTERNAL MEDICINE

## 2018-10-19 PROCEDURE — 83735 ASSAY OF MAGNESIUM: CPT

## 2018-10-19 PROCEDURE — 74011250637 HC RX REV CODE- 250/637: Performed by: HOSPITALIST

## 2018-10-19 PROCEDURE — 74011250636 HC RX REV CODE- 250/636: Performed by: INTERNAL MEDICINE

## 2018-10-19 PROCEDURE — 80076 HEPATIC FUNCTION PANEL: CPT

## 2018-10-19 PROCEDURE — 74011250636 HC RX REV CODE- 250/636: Performed by: HOSPITALIST

## 2018-10-19 PROCEDURE — 85025 COMPLETE CBC W/AUTO DIFF WBC: CPT

## 2018-10-19 PROCEDURE — 74011250637 HC RX REV CODE- 250/637: Performed by: INTERNAL MEDICINE

## 2018-10-19 PROCEDURE — 80048 BASIC METABOLIC PNL TOTAL CA: CPT

## 2018-10-19 PROCEDURE — 94760 N-INVAS EAR/PLS OXIMETRY 1: CPT

## 2018-10-19 PROCEDURE — 65270000029 HC RM PRIVATE

## 2018-10-19 PROCEDURE — 74011000250 HC RX REV CODE- 250: Performed by: INTERNAL MEDICINE

## 2018-10-19 PROCEDURE — 94640 AIRWAY INHALATION TREATMENT: CPT

## 2018-10-19 PROCEDURE — 77010033678 HC OXYGEN DAILY

## 2018-10-19 PROCEDURE — P9047 ALBUMIN (HUMAN), 25%, 50ML: HCPCS | Performed by: INTERNAL MEDICINE

## 2018-10-19 PROCEDURE — 74011250636 HC RX REV CODE- 250/636: Performed by: FAMILY MEDICINE

## 2018-10-19 PROCEDURE — 74011000250 HC RX REV CODE- 250: Performed by: HOSPITALIST

## 2018-10-19 RX ORDER — ALBUMIN HUMAN 250 G/1000ML
25 SOLUTION INTRAVENOUS 2 TIMES DAILY
Status: COMPLETED | OUTPATIENT
Start: 2018-10-19 | End: 2018-10-21

## 2018-10-19 RX ORDER — FUROSEMIDE 10 MG/ML
40 INJECTION INTRAMUSCULAR; INTRAVENOUS EVERY 12 HOURS
Status: DISCONTINUED | OUTPATIENT
Start: 2018-10-19 | End: 2018-10-21

## 2018-10-19 RX ADMIN — METHADONE HYDROCHLORIDE 30 MG: 10 TABLET ORAL at 09:35

## 2018-10-19 RX ADMIN — FUROSEMIDE 40 MG: 10 INJECTION, SOLUTION INTRAMUSCULAR; INTRAVENOUS at 09:36

## 2018-10-19 RX ADMIN — ALBUMIN (HUMAN) 25 G: 0.25 INJECTION, SOLUTION INTRAVENOUS at 17:11

## 2018-10-19 RX ADMIN — POTASSIUM CHLORIDE 20 MEQ: 20 TABLET, EXTENDED RELEASE ORAL at 09:36

## 2018-10-19 RX ADMIN — Medication 10 ML: at 22:02

## 2018-10-19 RX ADMIN — FERROUS SULFATE TAB 325 MG (65 MG ELEMENTAL FE) 325 MG: 325 (65 FE) TAB at 13:22

## 2018-10-19 RX ADMIN — PANTOPRAZOLE SODIUM 40 MG: 40 TABLET, DELAYED RELEASE ORAL at 09:36

## 2018-10-19 RX ADMIN — FERROUS SULFATE TAB 325 MG (65 MG ELEMENTAL FE) 325 MG: 325 (65 FE) TAB at 17:07

## 2018-10-19 RX ADMIN — Medication 400 MG: at 22:00

## 2018-10-19 RX ADMIN — ONDANSETRON HYDROCHLORIDE 4 MG: 2 INJECTION INTRAMUSCULAR; INTRAVENOUS at 14:58

## 2018-10-19 RX ADMIN — ONDANSETRON HYDROCHLORIDE 4 MG: 2 INJECTION INTRAMUSCULAR; INTRAVENOUS at 00:41

## 2018-10-19 RX ADMIN — Medication 10 ML: at 14:58

## 2018-10-19 RX ADMIN — Medication 400 MG: at 09:36

## 2018-10-19 RX ADMIN — FERROUS SULFATE TAB 325 MG (65 MG ELEMENTAL FE) 325 MG: 325 (65 FE) TAB at 09:36

## 2018-10-19 RX ADMIN — Medication 10 ML: at 00:41

## 2018-10-19 RX ADMIN — METHADONE HYDROCHLORIDE 30 MG: 10 TABLET ORAL at 17:07

## 2018-10-19 RX ADMIN — ENOXAPARIN SODIUM 40 MG: 40 INJECTION, SOLUTION INTRAVENOUS; SUBCUTANEOUS at 09:36

## 2018-10-19 RX ADMIN — ALBUTEROL SULFATE 2.5 MG: 2.5 SOLUTION RESPIRATORY (INHALATION) at 19:55

## 2018-10-19 RX ADMIN — ALBUTEROL SULFATE 2.5 MG: 2.5 SOLUTION RESPIRATORY (INHALATION) at 15:18

## 2018-10-19 RX ADMIN — Medication 10 ML: at 22:01

## 2018-10-19 RX ADMIN — ALBUMIN (HUMAN) 25 G: 0.25 INJECTION, SOLUTION INTRAVENOUS at 09:52

## 2018-10-19 RX ADMIN — SODIUM CHLORIDE 12.5 MG: 9 INJECTION INTRAMUSCULAR; INTRAVENOUS; SUBCUTANEOUS at 17:00

## 2018-10-19 RX ADMIN — Medication 2 G: at 00:40

## 2018-10-19 RX ADMIN — Medication 2 G: at 09:35

## 2018-10-19 RX ADMIN — ONDANSETRON HYDROCHLORIDE 4 MG: 2 INJECTION INTRAMUSCULAR; INTRAVENOUS at 09:47

## 2018-10-19 RX ADMIN — Medication 400 MG: at 17:07

## 2018-10-19 RX ADMIN — ALBUTEROL SULFATE 2.5 MG: 2.5 SOLUTION RESPIRATORY (INHALATION) at 07:53

## 2018-10-19 RX ADMIN — METOPROLOL SUCCINATE 25 MG: 25 TABLET, EXTENDED RELEASE ORAL at 22:00

## 2018-10-19 RX ADMIN — Medication 2 G: at 17:00

## 2018-10-19 RX ADMIN — SENNOSIDES AND DOCUSATE SODIUM 2 TABLET: 8.6; 5 TABLET ORAL at 22:00

## 2018-10-19 NOTE — PROGRESS NOTES
Infectious Disease Progress Note Today's Date: 10/19/2018 Admit Date: 10/14/2018 Impression: · Pleural effusions R>L, s/p thoracentesis 10/16/18 with 1000 ml serosanguinous fluid removed, analysis pending - likely CHF (EF 10%) vs infection related · MSSA bacteremia and presumed R-sided endocarditis with septic pulmonary emboli, s/p incomplete treatment (left AMA after 2 1/2 weeks of treatment) · R sacroiliac septic arthritis · IVDU: heroin, also uses cocaine; on methadone Plan:  
· Continue cefazolin. Her initial EOT was 10/22/18, but she missed two full weeks of treatment, leaving [facility] 9/28 and being admitted here 10/14/18. She will need an additional two weeks, of treatment, with new EOT 11/5/18. Would probably also extend treatment further with oral antibiotics on discharge. All blood cultures done here are negative to date. · Pt needs to see her  10/25; she is planning to move to [place] to be with her mom and get help with her addiction. Anti-infectives: · cefazolin Subjective:  
Patient not seen today but chart reviewed.

## 2018-10-19 NOTE — PROGRESS NOTES
Dr. Orion Maria made aware of call from tele stating patient's HR was dropping into the 50's and of low BP.

## 2018-10-19 NOTE — PROGRESS NOTES
Hospitalist Progress Note 10/19/2018 Admit Date: 10/14/2018 12:05 PM  
NAME: Camille Vallejo :  1985 MRN:  [id number] Attending: Juma Degroot DO 
PCP:  None Admitted for:sepsis, bacteremia - MSSA, IV drug use active SUBJECTIVE:  
Patient this morning sleeping, drowsy, but awoken by voice. Denies any pain no fever chills no shortness of breath. Review of Systems negative with exception of pertinent positives noted above Past medical history unchanged from H&P 
 
PHYSICAL EXAM  
 
Visit Vitals BP 98/66 (BP 1 Location: Right arm, BP Patient Position: At rest) Pulse 72 Temp 97.6 °F (36.4 °C) Resp 16 Ht 6' (1.829 m) Wt 86.5 kg (190 lb 9.6 oz) SpO2 98% Breastfeeding? No  
BMI 25.85 kg/m² Temp (24hrs), Av.9 °F (36.6 °C), Min:97.6 °F (36.4 °C), Max:98.8 °F (37.1 °C) Oxygen Therapy O2 Sat (%): 98 % (10/19/18 0854) Pulse via Oximetry: 81 beats per minute (10/19/18 0754) O2 Device: Nasal cannula (10/19/18 075) O2 Flow Rate (L/min): 2 l/min (10/19/18 0754) FIO2 (%): 24 % (10/17/18 2112) Intake/Output Summary (Last 24 hours) at 10/19/2018 1037 Last data filed at 10/19/2018 8350 Gross per 24 hour Intake 1350 ml Output 2150 ml Net -800 ml General: No acute distress  mucous membranes pink and moist acyanotic anicteric Neck:  Supple full range of motion Lungs:  Air entry equal bilaterally, no crepitations rales rhonchi Heart:  Regular rate and rhythm,  No murmur, rub, or gallop Abdomen: Soft, Non distended, Non tender, no rebound guarding Positive bowel sounds Extremities: No cyanosis, clubbing or edema Neurologic:  No focal deficits, very drowsy Musculoskeletal: no   Joint swelling tenderness erythema Skin:  No erythema, rashes noted, bilateral pedal edema 2 + 
 
 
 
 
LAB No results for input(s): GLUCPOC in the last 72 hours. No lab exists for component: Maxim Boss Recent Labs 10/19/18 
0805  10/17/18 
4768 WBC 10.1   < > 7.4 HGB 8.5*   < > 7.9*  
HCT 29.0*   < > 26.4*  
   < > 306 INR  --   --  1.2  
 < > = values in this interval not displayed. Recent Labs 10/19/18 
0805 * K 4.2 CL 87* CO2 36* GLU 77 BUN 25* CREA 1.05* MG 1.9  
CA 8.4 PHOS 2.4* ALB 2.5* TBILI 2.5* ALT 22 SGOT 35 EKG and imaging reviewed personally by me Xr Chest Sngl V Result Date: 10/18/2018 AP chest radiograph History: CHF, 33 years Female Comparison: Chest radiograph October 16, 2018 Findings:   Partially obscured cardiomediastinal silhouette. Persistent low lung volumes. Persistent moderate right and trace left pleural effusions with associated bibasilar atelectasis and or consolidation. Probable mild interstitial edema unchanged. No evidence of pneumothorax. Visualized soft tissue and osseous structures otherwise unremarkable. Impression:  No significant interval change. Results for orders placed or performed during the hospital encounter of 10/14/18 EKG, 12 LEAD, INITIAL Result Value Ref Range Ventricular Rate 114 BPM  
 Atrial Rate 114 BPM  
 P-R Interval 166 ms  
 QRS Duration 112 ms  
 Q-T Interval 330 ms QTC Calculation (Bezet) 454 ms Calculated P Axis 47 degrees Calculated R Axis 88 degrees Calculated T Axis -26 degrees Diagnosis Sinus tachycardia Biatrial enlargement ST & T wave abnormality, consider inferolateral ischemia Abnormal ECG When compared with ECG of 30-AUG-2018 00:34, Fusion complexes are no longer Present T wave inversion less evident in Anterolateral leads Confirmed by Ricky Ion ([id number]) on 10/14/2018 6:44:05 PM 
  
 
XR Results (most recent): 
Results from [facility] Encounter encounter on 10/14/18 XR CHEST SNGL V  
 Narrative AP chest radiograph History: CHF, 28 years Female Comparison: Chest radiograph October 16, 2018 Findings:   Partially obscured cardiomediastinal silhouette. Persistent low lung volumes. Persistent moderate right and trace left pleural effusions with 
associated bibasilar atelectasis and or consolidation. Probable mild 
interstitial edema unchanged. No evidence of pneumothorax. Visualized soft 
tissue and osseous structures otherwise unremarkable. Impression Impression:  No significant interval change. Active problems Active Hospital Problems Diagnosis Date Noted  Hypoproteinemia ([address] 10/17/2018  Pleural effusion 10/16/2018  Hypomagnesemia 10/16/2018  Hypokalemia 10/16/2018  Sepsis ([address] 10/14/2018  MSSA bacteremia 10/14/2018  Septic arthritis ([address] 10/14/2018  Prolonged Q-T interval on ECG 10/14/2018  Transaminitis 10/14/2018  Hyponatremia 10/14/2018  Systolic CHF, acute on chronic ([address] 08/30/2018  Heroin abuse ([facility] [address] 04/10/2018 ASSESSMENT  AND PLAN  
  
· MSSA bacteremia with endocarditis and right sacroiliac arthritis likely osteomyelitis - will continue antibiotics as per ID - with continue monitoring of renal function currently on ancef with plan for 6 weeks antibiotics End date to be determined by ID · Acute hypoxic respiratory failure and bilateral pleural effusions - due to CHF systolic dysfunction - will continue to wean oxygen and continue diuresis, pulmonary consult appreciated. Home oxygen evaluation at time of discharge · CHF systolic dysfunction acute on chronic - continue diuresis, doing better, still with pedal edema,  Will continue to monitor, cardiology consult appreciated · IV drug use active polysubstance dependence including heroin - will continue methadone while here in hospital , has no complaints of pain when I saw her this morning, currently hold IV pain medications. Has history of use in hospital, visitors limited · Severe protein malnutrition - continue supplementation · Electrolyte abnormality -improved continue to monitor while on diuresis · Gastritis - improved LMWH 
 DVT Prophylaxis:  
Patient remains high risk for decompensation, given endocarditis, active polysubstance abuse Signed By: Cassie Silva MD   
 October 19, 2018

## 2018-10-19 NOTE — PROGRESS NOTES
Assessment completed and documented. Lung sounds diminished in middle and lower left lobe. Heart sounds regular. Bowel sounds hypoactive. +3 edema in BLE. Strong pulses in BLE. Patient drowsy but arousable. Currently resting in bed. Will continue to monitor with hourly rounding.

## 2018-10-20 LAB
ANION GAP SERPL CALC-SCNC: 6 MMOL/L
BACTERIA SPEC CULT: NORMAL
BACTERIA SPEC CULT: NORMAL
BASOPHILS # BLD: 0.1 K/UL (ref 0–0.2)
BASOPHILS NFR BLD: 1 % (ref 0–2)
BUN SERPL-MCNC: 26 MG/DL (ref 6–23)
CALCIUM SERPL-MCNC: 8.6 MG/DL (ref 8.3–10.4)
CHLORIDE SERPL-SCNC: 87 MMOL/L (ref 98–107)
CO2 SERPL-SCNC: 35 MMOL/L (ref 21–32)
CREAT SERPL-MCNC: 1.1 MG/DL (ref 0.6–1)
DIFFERENTIAL METHOD BLD: ABNORMAL
EOSINOPHIL # BLD: 0 K/UL (ref 0–0.8)
EOSINOPHIL NFR BLD: 0 % (ref 0.5–7.8)
ERYTHROCYTE [DISTWIDTH] IN BLOOD BY AUTOMATED COUNT: 19.5 %
GLUCOSE SERPL-MCNC: 87 MG/DL (ref 65–100)
HCT VFR BLD AUTO: 26.7 % (ref 35.8–46.3)
HGB BLD-MCNC: 7.8 G/DL (ref 11.7–15.4)
IMM GRANULOCYTES # BLD: 0.1 K/UL (ref 0–0.5)
IMM GRANULOCYTES NFR BLD AUTO: 1 % (ref 0–5)
LYMPHOCYTES # BLD: 1.6 K/UL (ref 0.5–4.6)
LYMPHOCYTES NFR BLD: 18 % (ref 13–44)
MAGNESIUM SERPL-MCNC: 2 MG/DL (ref 1.8–2.4)
MCH RBC QN AUTO: 25.7 PG (ref 26.1–32.9)
MCHC RBC AUTO-ENTMCNC: 29.2 G/DL (ref 31.4–35)
MCV RBC AUTO: 88.1 FL (ref 79.6–97.8)
MONOCYTES # BLD: 0.9 K/UL (ref 0.1–1.3)
MONOCYTES NFR BLD: 10 % (ref 4–12)
NEUTS SEG # BLD: 6.1 K/UL (ref 1.7–8.2)
NEUTS SEG NFR BLD: 70 % (ref 43–78)
NRBC # BLD: 0.07 K/UL (ref 0–0.2)
PHOSPHATE SERPL-MCNC: 2.6 MG/DL (ref 2.5–4.5)
PLATELET # BLD AUTO: 353 K/UL (ref 150–450)
PMV BLD AUTO: 9.4 FL (ref 9.4–12.3)
POTASSIUM SERPL-SCNC: 4.3 MMOL/L (ref 3.5–5.1)
RBC # BLD AUTO: 3.03 M/UL (ref 4.05–5.2)
SERVICE CMNT-IMP: NORMAL
SERVICE CMNT-IMP: NORMAL
SODIUM SERPL-SCNC: 128 MMOL/L (ref 136–145)
WBC # BLD AUTO: 8.8 K/UL (ref 4.3–11.1)

## 2018-10-20 PROCEDURE — 74011000250 HC RX REV CODE- 250: Performed by: INTERNAL MEDICINE

## 2018-10-20 PROCEDURE — 74011000250 HC RX REV CODE- 250: Performed by: HOSPITALIST

## 2018-10-20 PROCEDURE — 94640 AIRWAY INHALATION TREATMENT: CPT

## 2018-10-20 PROCEDURE — 74011250636 HC RX REV CODE- 250/636: Performed by: INTERNAL MEDICINE

## 2018-10-20 PROCEDURE — 74011250636 HC RX REV CODE- 250/636: Performed by: FAMILY MEDICINE

## 2018-10-20 PROCEDURE — 77010033678 HC OXYGEN DAILY

## 2018-10-20 PROCEDURE — 74011250637 HC RX REV CODE- 250/637: Performed by: INTERNAL MEDICINE

## 2018-10-20 PROCEDURE — 74011250636 HC RX REV CODE- 250/636: Performed by: HOSPITALIST

## 2018-10-20 PROCEDURE — P9047 ALBUMIN (HUMAN), 25%, 50ML: HCPCS | Performed by: INTERNAL MEDICINE

## 2018-10-20 PROCEDURE — 94760 N-INVAS EAR/PLS OXIMETRY 1: CPT

## 2018-10-20 PROCEDURE — 36415 COLL VENOUS BLD VENIPUNCTURE: CPT

## 2018-10-20 PROCEDURE — 84100 ASSAY OF PHOSPHORUS: CPT

## 2018-10-20 PROCEDURE — 74011250637 HC RX REV CODE- 250/637: Performed by: HOSPITALIST

## 2018-10-20 PROCEDURE — 83735 ASSAY OF MAGNESIUM: CPT

## 2018-10-20 PROCEDURE — 74011250637 HC RX REV CODE- 250/637: Performed by: FAMILY MEDICINE

## 2018-10-20 PROCEDURE — 85025 COMPLETE CBC W/AUTO DIFF WBC: CPT

## 2018-10-20 PROCEDURE — 65270000029 HC RM PRIVATE

## 2018-10-20 PROCEDURE — 80048 BASIC METABOLIC PNL TOTAL CA: CPT

## 2018-10-20 RX ADMIN — ALBUTEROL SULFATE 2.5 MG: 2.5 SOLUTION RESPIRATORY (INHALATION) at 14:27

## 2018-10-20 RX ADMIN — SENNOSIDES AND DOCUSATE SODIUM 2 TABLET: 8.6; 5 TABLET ORAL at 22:56

## 2018-10-20 RX ADMIN — FERROUS SULFATE TAB 325 MG (65 MG ELEMENTAL FE) 325 MG: 325 (65 FE) TAB at 09:31

## 2018-10-20 RX ADMIN — Medication 2 G: at 15:38

## 2018-10-20 RX ADMIN — Medication 400 MG: at 22:55

## 2018-10-20 RX ADMIN — Medication 10 ML: at 22:00

## 2018-10-20 RX ADMIN — POTASSIUM CHLORIDE 20 MEQ: 20 TABLET, EXTENDED RELEASE ORAL at 09:32

## 2018-10-20 RX ADMIN — FERROUS SULFATE TAB 325 MG (65 MG ELEMENTAL FE) 325 MG: 325 (65 FE) TAB at 15:38

## 2018-10-20 RX ADMIN — PANTOPRAZOLE SODIUM 40 MG: 40 TABLET, DELAYED RELEASE ORAL at 09:31

## 2018-10-20 RX ADMIN — Medication 10 ML: at 15:37

## 2018-10-20 RX ADMIN — SPIRONOLACTONE 50 MG: 25 TABLET ORAL at 17:05

## 2018-10-20 RX ADMIN — SPIRONOLACTONE 50 MG: 25 TABLET ORAL at 09:31

## 2018-10-20 RX ADMIN — Medication 2 G: at 00:21

## 2018-10-20 RX ADMIN — Medication 400 MG: at 15:47

## 2018-10-20 RX ADMIN — FERROUS SULFATE TAB 325 MG (65 MG ELEMENTAL FE) 325 MG: 325 (65 FE) TAB at 17:10

## 2018-10-20 RX ADMIN — Medication 2 G: at 09:27

## 2018-10-20 RX ADMIN — FUROSEMIDE 40 MG: 10 INJECTION, SOLUTION INTRAMUSCULAR; INTRAVENOUS at 09:32

## 2018-10-20 RX ADMIN — ALBUMIN (HUMAN) 25 G: 0.25 INJECTION, SOLUTION INTRAVENOUS at 17:15

## 2018-10-20 RX ADMIN — METHADONE HYDROCHLORIDE 30 MG: 10 TABLET ORAL at 09:31

## 2018-10-20 RX ADMIN — METHADONE HYDROCHLORIDE 30 MG: 10 TABLET ORAL at 17:10

## 2018-10-20 RX ADMIN — ONDANSETRON HYDROCHLORIDE 4 MG: 2 INJECTION INTRAMUSCULAR; INTRAVENOUS at 15:42

## 2018-10-20 RX ADMIN — METOPROLOL SUCCINATE 25 MG: 25 TABLET, EXTENDED RELEASE ORAL at 09:31

## 2018-10-20 RX ADMIN — ALBUTEROL SULFATE 2.5 MG: 2.5 SOLUTION RESPIRATORY (INHALATION) at 19:48

## 2018-10-20 RX ADMIN — Medication 2 G: at 23:05

## 2018-10-20 RX ADMIN — SODIUM CHLORIDE 12.5 MG: 9 INJECTION INTRAMUSCULAR; INTRAVENOUS; SUBCUTANEOUS at 02:32

## 2018-10-20 RX ADMIN — FUROSEMIDE 40 MG: 10 INJECTION, SOLUTION INTRAMUSCULAR; INTRAVENOUS at 22:55

## 2018-10-20 RX ADMIN — LISINOPRIL 2.5 MG: 5 TABLET ORAL at 09:29

## 2018-10-20 RX ADMIN — ALBUMIN (HUMAN) 25 G: 0.25 INJECTION, SOLUTION INTRAVENOUS at 09:43

## 2018-10-20 RX ADMIN — ALBUTEROL SULFATE 2.5 MG: 2.5 SOLUTION RESPIRATORY (INHALATION) at 08:44

## 2018-10-20 RX ADMIN — ENOXAPARIN SODIUM 40 MG: 40 INJECTION, SOLUTION INTRAVENOUS; SUBCUTANEOUS at 09:29

## 2018-10-20 RX ADMIN — Medication 400 MG: at 09:31

## 2018-10-20 RX ADMIN — ONDANSETRON HYDROCHLORIDE 4 MG: 2 INJECTION INTRAMUSCULAR; INTRAVENOUS at 23:09

## 2018-10-20 RX ADMIN — ACETAMINOPHEN 650 MG: 325 TABLET, FILM COATED ORAL at 03:32

## 2018-10-20 NOTE — PROGRESS NOTES
Patient refused sitter to keep eyes on her while in the bathroom. She insist on closing the bathroom door. Charge nurse and supervisor fully explain the purpose of keeping her safe. But patient still refuse to comply. MD made aware.

## 2018-10-20 NOTE — PROGRESS NOTES
Hospitalist Progress Note   
10/20/2018 Admit Date: 10/14/2018 12:05 PM  
NAME: Ladi Amayaer :  1985 MRN:  [id number] Attending: Akosua Jerome DO 
PCP:  None Admitted for:endocarditis, septic arthritis joint, polysubstance abuse SUBJECTIVE: This morning Ms Chapin Smith is sitting up in chair, states not having any pain, swelling in legs doing a little better. She however is angry about having a sitter in room, and having door open (explained reason why) she currently denies using her PICC at previous hospital for injecting. She is defensive and stating this hospital stay is ruining her life. She is adamant about seeing her  on 10/25 to initiate transfer to [facility] ( I offered to call her  to see if this could be initiated from hospital - she refused), I further explained that we do not do short term release from hospital due to liability reasons and that  it would have to be a discharge and she would have to return through the ED. Review of Systems negative with exception of pertinent positives noted above Past medical history unchanged from H&P 
 
PHYSICAL EXAM  
 
Visit Vitals /71 (BP 1 Location: Right arm, BP Patient Position: At rest;Supine) Pulse 90 Temp 97.7 °F (36.5 °C) Resp 20 Ht 6' (1.829 m) Wt 65.8 kg (145 lb 1 oz) SpO2 97% Breastfeeding? No  
BMI 19.67 kg/m² Temp (24hrs), Av.5 °F (36.9 °C), Min:97.7 °F (36.5 °C), Max:100.6 °F (38.1 °C) Oxygen Therapy O2 Sat (%): 97 % (10/20/18 0845) Pulse via Oximetry: 115 beats per minute (10/20/18 0845) O2 Device: Nasal cannula (10/20/18 0845) O2 Flow Rate (L/min): 1 l/min(changed to RA) (10/20/18 0845) FIO2 (%): 24 % (10/17/18 2112) Intake/Output Summary (Last 24 hours) at 10/20/2018 0043 Last data filed at 10/19/2018 1844 Gross per 24 hour Intake 1060 ml Output 1550 ml Net -490 ml General: No acute distress  mucous membranes pink and moist acyanotic anicteric Neck:  Supple full range of motion Lungs:  Air entry equal bilaterally, no crepitations rales rhonchi Heart:  Regular rate and rhythm,  No murmur, rub, or gallop Abdomen: Soft, Non distended, Non tender, no rebound guarding Positive bowel sounds Extremities: No cyanosis, clubbing or bilateral pedal edema Neurologic:  No focal deficits Musculoskeletal: no   Joint swelling tenderness erythema Skin:  No erythema, rashes noted LAB No results for input(s): GLUCPOC in the last 72 hours. No lab exists for component: Maxim Point Recent Labs 10/20/18 
1378 WBC 8.8 HGB 7.8* HCT 26.7*  
 Recent Labs 10/20/18 
0525 10/19/18 
0805 * 132* K 4.3 4.2 CL 87* 87* CO2 35* 36* GLU 87 77 BUN 26* 25* CREA 1.10* 1.05* MG 2.0 1.9 CA 8.6 8.4 PHOS 2.6 2.4* ALB  --  2.5* TBILI  --  2.5* ALT  --  22 SGOT  --  35 EKG and imaging reviewed personally by me No results found. Results for orders placed or performed during the hospital encounter of 10/14/18 EKG, 12 LEAD, INITIAL Result Value Ref Range Ventricular Rate 114 BPM  
 Atrial Rate 114 BPM  
 P-R Interval 166 ms  
 QRS Duration 112 ms  
 Q-T Interval 330 ms QTC Calculation (Bezet) 454 ms Calculated P Axis 47 degrees Calculated R Axis 88 degrees Calculated T Axis -26 degrees Diagnosis Sinus tachycardia Biatrial enlargement ST & T wave abnormality, consider inferolateral ischemia Abnormal ECG When compared with ECG of 30-AUG-2018 00:34, Fusion complexes are no longer Present T wave inversion less evident in Anterolateral leads Confirmed by Duane Plasencia ([id number]) on 10/14/2018 6:44:05 PM 
  
 
XR Results (most recent): 
Results from [facility] Encounter encounter on 10/14/18 XR CHEST SNGL V  
 Narrative AP chest radiograph History: CHF, 28 years Female Comparison: Chest radiograph October 16, 2018 Findings:   Partially obscured cardiomediastinal silhouette. Persistent low 
lung volumes. Persistent moderate right and trace left pleural effusions with 
associated bibasilar atelectasis and or consolidation. Probable mild 
interstitial edema unchanged. No evidence of pneumothorax. Visualized soft 
tissue and osseous structures otherwise unremarkable. Impression Impression:  No significant interval change. Active problems Active Hospital Problems Diagnosis Date Noted  Hypoproteinemia ([address] 10/17/2018  Pleural effusion 10/16/2018  Hypomagnesemia 10/16/2018  Hypokalemia 10/16/2018  Sepsis ([address] 10/14/2018  MSSA bacteremia 10/14/2018  Septic arthritis ([address] 10/14/2018  Prolonged Q-T interval on ECG 10/14/2018  Transaminitis 10/14/2018  Hyponatremia 10/14/2018  Systolic CHF, acute on chronic ([address] 08/30/2018  Heroin abuse ([address] 04/10/2018 ASSESSMENT  AND PLAN  
  
· Sepsis due to MSSA bacteremia and endocarditis, with septic pulmonary emboli - end of treatment 11/5/2018 will continue cefazolin. · CHF systolic Heart failure, acute on chronic - EF of 10% - currently doing well symptoms wise, continue IV lasix bid, will likely transition to oral regime tomorrow, still has some pedal edema , continue BB, and ace, she is negative 9 L in hospital, creatinine slowly increasing · Polysubstance abuse IV heroin - patient adamant that she has not used in hospital, given having central line, at this time do not think it is prudent to discontinue sitter for now until definitive drug screen is back. She however remains steadfast about leaving hospital for her parole appointment does not wish us to explore alternatives, including rescheduling, initiation of forms here in hospital, or other solutions. She will likely have to Sign AMA, picc line will be removed prior to her leaving, and she will have to return through the ED unfortunately. · Chronic pain/withdrawal - seems to be doing well with methadone at current dose. Will continue to monitor closely DVT Prophylaxis: LMWH Patient remains high risk for decompensation, given sepsis with endocarditis, Signed By: Katrina Ferrera MD   
 October 20, 2018

## 2018-10-21 LAB
ANION GAP SERPL CALC-SCNC: 7 MMOL/L
BASOPHILS # BLD: 0.1 K/UL (ref 0–0.2)
BASOPHILS NFR BLD: 1 % (ref 0–2)
BUN SERPL-MCNC: 25 MG/DL (ref 6–23)
CALCIUM SERPL-MCNC: 8.7 MG/DL (ref 8.3–10.4)
CHLORIDE SERPL-SCNC: 88 MMOL/L (ref 98–107)
CO2 SERPL-SCNC: 34 MMOL/L (ref 21–32)
CREAT SERPL-MCNC: 1.07 MG/DL (ref 0.6–1)
DIFFERENTIAL METHOD BLD: ABNORMAL
EOSINOPHIL # BLD: 0.1 K/UL (ref 0–0.8)
EOSINOPHIL NFR BLD: 1 % (ref 0.5–7.8)
ERYTHROCYTE [DISTWIDTH] IN BLOOD BY AUTOMATED COUNT: 19.9 %
GLUCOSE SERPL-MCNC: 83 MG/DL (ref 65–100)
HCT VFR BLD AUTO: 28.3 % (ref 35.8–46.3)
HGB BLD-MCNC: 8.1 G/DL (ref 11.7–15.4)
IMM GRANULOCYTES # BLD: 0.1 K/UL (ref 0–0.5)
IMM GRANULOCYTES NFR BLD AUTO: 1 % (ref 0–5)
LYMPHOCYTES # BLD: 1.8 K/UL (ref 0.5–4.6)
LYMPHOCYTES NFR BLD: 19 % (ref 13–44)
MAGNESIUM SERPL-MCNC: 1.9 MG/DL (ref 1.8–2.4)
MCH RBC QN AUTO: 25.7 PG (ref 26.1–32.9)
MCHC RBC AUTO-ENTMCNC: 28.6 G/DL (ref 31.4–35)
MCV RBC AUTO: 89.8 FL (ref 79.6–97.8)
MONOCYTES # BLD: 1 K/UL (ref 0.1–1.3)
MONOCYTES NFR BLD: 11 % (ref 4–12)
NEUTS SEG # BLD: 6.5 K/UL (ref 1.7–8.2)
NEUTS SEG NFR BLD: 69 % (ref 43–78)
NRBC # BLD: 0.1 K/UL (ref 0–0.2)
PHOSPHATE SERPL-MCNC: 2.6 MG/DL (ref 2.5–4.5)
PLATELET # BLD AUTO: 405 K/UL (ref 150–450)
PMV BLD AUTO: 9.3 FL (ref 9.4–12.3)
POTASSIUM SERPL-SCNC: 4.4 MMOL/L (ref 3.5–5.1)
RBC # BLD AUTO: 3.15 M/UL (ref 4.05–5.2)
SODIUM SERPL-SCNC: 129 MMOL/L (ref 136–145)
WBC # BLD AUTO: 9.5 K/UL (ref 4.3–11.1)

## 2018-10-21 PROCEDURE — 36415 COLL VENOUS BLD VENIPUNCTURE: CPT

## 2018-10-21 PROCEDURE — 74011250637 HC RX REV CODE- 250/637: Performed by: INTERNAL MEDICINE

## 2018-10-21 PROCEDURE — 85025 COMPLETE CBC W/AUTO DIFF WBC: CPT

## 2018-10-21 PROCEDURE — 74011250637 HC RX REV CODE- 250/637: Performed by: HOSPITALIST

## 2018-10-21 PROCEDURE — 94640 AIRWAY INHALATION TREATMENT: CPT

## 2018-10-21 PROCEDURE — 65270000029 HC RM PRIVATE

## 2018-10-21 PROCEDURE — 74011000250 HC RX REV CODE- 250: Performed by: INTERNAL MEDICINE

## 2018-10-21 PROCEDURE — 83735 ASSAY OF MAGNESIUM: CPT

## 2018-10-21 PROCEDURE — 77010033678 HC OXYGEN DAILY

## 2018-10-21 PROCEDURE — 94760 N-INVAS EAR/PLS OXIMETRY 1: CPT

## 2018-10-21 PROCEDURE — P9047 ALBUMIN (HUMAN), 25%, 50ML: HCPCS | Performed by: INTERNAL MEDICINE

## 2018-10-21 PROCEDURE — 74011250636 HC RX REV CODE- 250/636: Performed by: FAMILY MEDICINE

## 2018-10-21 PROCEDURE — 80048 BASIC METABOLIC PNL TOTAL CA: CPT

## 2018-10-21 PROCEDURE — 74011250636 HC RX REV CODE- 250/636: Performed by: INTERNAL MEDICINE

## 2018-10-21 PROCEDURE — 84100 ASSAY OF PHOSPHORUS: CPT

## 2018-10-21 PROCEDURE — 74011250636 HC RX REV CODE- 250/636: Performed by: HOSPITALIST

## 2018-10-21 RX ORDER — TORSEMIDE 20 MG/1
20 TABLET ORAL 2 TIMES DAILY
Status: DISCONTINUED | OUTPATIENT
Start: 2018-10-21 | End: 2018-10-25 | Stop reason: HOSPADM

## 2018-10-21 RX ADMIN — Medication 2 G: at 15:57

## 2018-10-21 RX ADMIN — ALBUTEROL SULFATE 2.5 MG: 2.5 SOLUTION RESPIRATORY (INHALATION) at 09:02

## 2018-10-21 RX ADMIN — LISINOPRIL 2.5 MG: 5 TABLET ORAL at 09:11

## 2018-10-21 RX ADMIN — Medication 10 ML: at 15:57

## 2018-10-21 RX ADMIN — METOPROLOL SUCCINATE 25 MG: 25 TABLET, EXTENDED RELEASE ORAL at 21:35

## 2018-10-21 RX ADMIN — Medication 10 ML: at 06:00

## 2018-10-21 RX ADMIN — METHADONE HYDROCHLORIDE 30 MG: 10 TABLET ORAL at 09:11

## 2018-10-21 RX ADMIN — Medication 10 ML: at 15:56

## 2018-10-21 RX ADMIN — Medication 400 MG: at 09:12

## 2018-10-21 RX ADMIN — FERROUS SULFATE TAB 325 MG (65 MG ELEMENTAL FE) 325 MG: 325 (65 FE) TAB at 11:52

## 2018-10-21 RX ADMIN — TORSEMIDE 20 MG: 20 TABLET ORAL at 18:00

## 2018-10-21 RX ADMIN — ALBUMIN (HUMAN) 25 G: 0.25 INJECTION, SOLUTION INTRAVENOUS at 11:52

## 2018-10-21 RX ADMIN — ALBUMIN (HUMAN) 25 G: 0.25 INJECTION, SOLUTION INTRAVENOUS at 17:42

## 2018-10-21 RX ADMIN — SPIRONOLACTONE 50 MG: 25 TABLET ORAL at 09:10

## 2018-10-21 RX ADMIN — Medication 400 MG: at 21:36

## 2018-10-21 RX ADMIN — SPIRONOLACTONE 50 MG: 25 TABLET ORAL at 17:41

## 2018-10-21 RX ADMIN — ALBUTEROL SULFATE 2.5 MG: 2.5 SOLUTION RESPIRATORY (INHALATION) at 19:50

## 2018-10-21 RX ADMIN — ONDANSETRON HYDROCHLORIDE 4 MG: 2 INJECTION INTRAMUSCULAR; INTRAVENOUS at 16:00

## 2018-10-21 RX ADMIN — FERROUS SULFATE TAB 325 MG (65 MG ELEMENTAL FE) 325 MG: 325 (65 FE) TAB at 09:12

## 2018-10-21 RX ADMIN — METHADONE HYDROCHLORIDE 30 MG: 10 TABLET ORAL at 17:41

## 2018-10-21 RX ADMIN — Medication 10 ML: at 21:37

## 2018-10-21 RX ADMIN — FERROUS SULFATE TAB 325 MG (65 MG ELEMENTAL FE) 325 MG: 325 (65 FE) TAB at 16:20

## 2018-10-21 RX ADMIN — Medication 400 MG: at 16:20

## 2018-10-21 RX ADMIN — ALBUTEROL SULFATE 2.5 MG: 2.5 SOLUTION RESPIRATORY (INHALATION) at 15:28

## 2018-10-21 RX ADMIN — POTASSIUM CHLORIDE 20 MEQ: 20 TABLET, EXTENDED RELEASE ORAL at 09:11

## 2018-10-21 RX ADMIN — METOPROLOL SUCCINATE 25 MG: 25 TABLET, EXTENDED RELEASE ORAL at 09:10

## 2018-10-21 RX ADMIN — PANTOPRAZOLE SODIUM 40 MG: 40 TABLET, DELAYED RELEASE ORAL at 09:12

## 2018-10-21 RX ADMIN — FUROSEMIDE 40 MG: 10 INJECTION, SOLUTION INTRAMUSCULAR; INTRAVENOUS at 09:12

## 2018-10-21 RX ADMIN — SENNOSIDES AND DOCUSATE SODIUM 2 TABLET: 8.6; 5 TABLET ORAL at 21:35

## 2018-10-21 RX ADMIN — Medication 2 G: at 09:05

## 2018-10-21 RX ADMIN — ENOXAPARIN SODIUM 40 MG: 40 INJECTION, SOLUTION INTRAVENOUS; SUBCUTANEOUS at 09:02

## 2018-10-21 RX ADMIN — ONDANSETRON HYDROCHLORIDE 4 MG: 2 INJECTION INTRAMUSCULAR; INTRAVENOUS at 09:02

## 2018-10-21 NOTE — PROGRESS NOTES
Patient is alert and oriented x4. Patient has been cooperative this time. Sitter is in room with patient. Edema noted to bilateral lower extremities. Lung sounds are diminished and patient has shortness of breath on exertion. Currently sitting in a low, locked bed with call light within reach.

## 2018-10-21 NOTE — PROGRESS NOTES
Patient is alert and oriented x4. Patient has been cooperative and calm at this time. Sitter is in room with patient. Edema noted to bilateral lower extremities. Lung sounds are diminished and patient has shortness of breath on exertion. Currently sitting in a locked recliner with call light within reach.

## 2018-10-21 NOTE — PROGRESS NOTES
Hospitalist Progress Note   
10/21/2018 Admit Date: 10/14/2018 12:05 PM  
NAME: David Drew :  1985 MRN:  [id number] Attending: Brittaney Quan DO 
PCP:  None Admitted for:sepsis MSSA bacteremia, endocarditis, septic joint, IVDU SUBJECTIVE:  
Patient this morning has no complaints, pain is doing well, no nausea no shortness of breath no cough. Pedal edema, continues to improve. Review of Systems negative with exception of pertinent positives noted above Past medical history unchanged from H&P 
 
PHYSICAL EXAM  
 
Visit Vitals /75 (BP 1 Location: Right arm, BP Patient Position: At rest) Pulse 79 Temp 97.3 °F (36.3 °C) Resp 18 Ht 6' (1.829 m) Wt 65.8 kg (145 lb 1 oz) SpO2 97% Breastfeeding? No  
BMI 19.67 kg/m² Temp (24hrs), Av.1 °F (36.7 °C), Min:97.3 °F (36.3 °C), Max:99.3 °F (37.4 °C) Oxygen Therapy O2 Sat (%): 97 % (10/21/18 0903) Pulse via Oximetry: 94 beats per minute (10/21/18 0903) O2 Device: Nasal cannula (10/21/18 0903) O2 Flow Rate (L/min): 1 l/min (10/21/18 0903) FIO2 (%): 24 % (10/17/18 2112) Intake/Output Summary (Last 24 hours) at 10/21/2018 0188 Last data filed at 10/21/2018 0452 Gross per 24 hour Intake 1113 ml Output 2900 ml Net -1787 ml General: No acute distress  mucous membranes pink and moist acyanotic anicteric Neck:  Supple full range of motion Lungs:  Air entry equal bilaterally, no crepitations rales rhonchi Heart:  Regular rate and rhythm,  No murmur, rub, or gallop Abdomen: Soft, Non distended, Non tender, no rebound guarding Positive bowel sounds Extremities: No cyanosis, clubbing or bilateral pedal edema 2+ Neurologic:  No focal deficits Musculoskeletal: no   Joint swelling tenderness erythema Skin:  No erythema, rashes noted LAB No results for input(s): GLUCPOC in the last 72 hours. No lab exists for component: Maxim Boss Recent Labs 10/21/18 
0602 WBC 9.5 HGB 8.1*  
 HCT 28.3*  
 Recent Labs 10/21/18 
0602  10/19/18 
0805 *   < > 132* K 4.4   < > 4.2 CL 88*   < > 87* CO2 34*   < > 36* GLU 83   < > 77 BUN 25*   < > 25* CREA 1.07*   < > 1.05* MG 1.9   < > 1.9 CA 8.7   < > 8.4 PHOS 2.6   < > 2.4* ALB  --   --  2.5* TBILI  --   --  2.5* ALT  --   --  22 SGOT  --   --  35  
 < > = values in this interval not displayed. EKG and imaging reviewed personally by me No results found. Results for orders placed or performed during the hospital encounter of 10/14/18 EKG, 12 LEAD, INITIAL Result Value Ref Range Ventricular Rate 114 BPM  
 Atrial Rate 114 BPM  
 P-R Interval 166 ms  
 QRS Duration 112 ms  
 Q-T Interval 330 ms QTC Calculation (Bezet) 454 ms Calculated P Axis 47 degrees Calculated R Axis 88 degrees Calculated T Axis -26 degrees Diagnosis Sinus tachycardia Biatrial enlargement ST & T wave abnormality, consider inferolateral ischemia Abnormal ECG When compared with ECG of 30-AUG-2018 00:34, Fusion complexes are no longer Present T wave inversion less evident in Anterolateral leads Confirmed by Pippa Brooks ([id number]) on 10/14/2018 6:44:05 PM 
  
 
XR Results (most recent): 
Results from [facility] Encounter encounter on 10/14/18 XR CHEST SNGL V  
 Narrative AP chest radiograph History: CHF, 28 years Female Comparison: Chest radiograph October 16, 2018 Findings:   Partially obscured cardiomediastinal silhouette. Persistent low 
lung volumes. Persistent moderate right and trace left pleural effusions with 
associated bibasilar atelectasis and or consolidation. Probable mild 
interstitial edema unchanged. No evidence of pneumothorax. Visualized soft 
tissue and osseous structures otherwise unremarkable. Impression Impression:  No significant interval change. Active problems Active Hospital Problems Diagnosis Date Noted  Hypoproteinemia ([address] 10/17/2018  Pleural effusion 10/16/2018  Hypomagnesemia 10/16/2018  Hypokalemia 10/16/2018  Sepsis ([facility] [address] 10/14/2018  MSSA bacteremia 10/14/2018  Septic arthritis ([facility] [address] 10/14/2018  Prolonged Q-T interval on ECG 10/14/2018  Transaminitis 10/14/2018  Hyponatremia 10/14/2018  Systolic CHF, acute on chronic ([facility] [address] 08/30/2018  Heroin abuse ([facility] [address] 04/10/2018 ASSESSMENT  AND PLAN  
  
· Sepsis due to MSSA bacteremia and endocarditis, with septic pulmonary emboli - end of treatment 11/5/2018 will continue cefazolin. · Septic arthritis right sacroiliac joint - continue as above · CHF systolic Heart failure, acute on chronic - EF of 10% - currently doing well symptoms wise, will change to toresemide given her low albumin, continue BB, and ace, 
· Polysubstance abuse IV heroin - follow up urine drug screen Ms Mandi Moreno however remains steadfast about leaving hospital for her parole appointment 10/25 does not wish us to explore alternatives, including rescheduling, initiation of forms here in hospital, or other solutions. She will likely have to Sign AMA, picc line will be removed prior to her leaving, and she will have to return through the ED unfortunately. · Chronic pain/withdrawal - seems to be doing well with methadone at current dose. Will continue to monitor closely · Hyponatremia - will transition to oral diuretic, will continue to monitor otherwise stable · Moderate protein malnutrition - continue supplementation as per dietary DVT Prophylaxis: LMWH Signed By: Tolu Enciso MD   
 October 21, 2018

## 2018-10-22 LAB
ANION GAP SERPL CALC-SCNC: 6 MMOL/L
BACTERIA SPEC CULT: NORMAL
BACTERIA SPEC CULT: NORMAL
BASOPHILS # BLD: 0.1 K/UL (ref 0–0.2)
BASOPHILS NFR BLD: 1 % (ref 0–2)
BUN SERPL-MCNC: 20 MG/DL (ref 6–23)
CALCIUM SERPL-MCNC: 8.6 MG/DL (ref 8.3–10.4)
CHLORIDE SERPL-SCNC: 88 MMOL/L (ref 98–107)
CO2 SERPL-SCNC: 37 MMOL/L (ref 21–32)
CREAT SERPL-MCNC: 1.16 MG/DL (ref 0.6–1)
DIFFERENTIAL METHOD BLD: ABNORMAL
EOSINOPHIL # BLD: 0 K/UL (ref 0–0.8)
EOSINOPHIL NFR BLD: 0 % (ref 0.5–7.8)
ERYTHROCYTE [DISTWIDTH] IN BLOOD BY AUTOMATED COUNT: 20.6 %
GLUCOSE SERPL-MCNC: 100 MG/DL (ref 65–100)
HCT VFR BLD AUTO: 28.8 % (ref 35.8–46.3)
HGB BLD-MCNC: 8 G/DL (ref 11.7–15.4)
IMM GRANULOCYTES # BLD: 0.1 K/UL (ref 0–0.5)
IMM GRANULOCYTES NFR BLD AUTO: 1 % (ref 0–5)
LYMPHOCYTES # BLD: 1.8 K/UL (ref 0.5–4.6)
LYMPHOCYTES NFR BLD: 20 % (ref 13–44)
MAGNESIUM SERPL-MCNC: 2 MG/DL (ref 1.8–2.4)
MCH RBC QN AUTO: 25.2 PG (ref 26.1–32.9)
MCHC RBC AUTO-ENTMCNC: 27.8 G/DL (ref 31.4–35)
MCV RBC AUTO: 90.6 FL (ref 79.6–97.8)
MONOCYTES # BLD: 0.9 K/UL (ref 0.1–1.3)
MONOCYTES NFR BLD: 10 % (ref 4–12)
NEUTS SEG # BLD: 6.4 K/UL (ref 1.7–8.2)
NEUTS SEG NFR BLD: 69 % (ref 43–78)
NRBC # BLD: 0.13 K/UL (ref 0–0.2)
PHOSPHATE SERPL-MCNC: 2.4 MG/DL (ref 2.5–4.5)
PLATELET # BLD AUTO: 385 K/UL (ref 150–450)
PMV BLD AUTO: 9.1 FL (ref 9.4–12.3)
POTASSIUM SERPL-SCNC: 4.5 MMOL/L (ref 3.5–5.1)
RBC # BLD AUTO: 3.18 M/UL (ref 4.05–5.2)
SERVICE CMNT-IMP: NORMAL
SERVICE CMNT-IMP: NORMAL
SODIUM SERPL-SCNC: 131 MMOL/L (ref 136–145)
WBC # BLD AUTO: 9.3 K/UL (ref 4.3–11.1)

## 2018-10-22 PROCEDURE — 74011250636 HC RX REV CODE- 250/636: Performed by: FAMILY MEDICINE

## 2018-10-22 PROCEDURE — 84100 ASSAY OF PHOSPHORUS: CPT

## 2018-10-22 PROCEDURE — 65270000029 HC RM PRIVATE

## 2018-10-22 PROCEDURE — 74011000250 HC RX REV CODE- 250: Performed by: INTERNAL MEDICINE

## 2018-10-22 PROCEDURE — 77010033678 HC OXYGEN DAILY

## 2018-10-22 PROCEDURE — 74011250637 HC RX REV CODE- 250/637: Performed by: HOSPITALIST

## 2018-10-22 PROCEDURE — 85025 COMPLETE CBC W/AUTO DIFF WBC: CPT

## 2018-10-22 PROCEDURE — 94760 N-INVAS EAR/PLS OXIMETRY 1: CPT

## 2018-10-22 PROCEDURE — 36415 COLL VENOUS BLD VENIPUNCTURE: CPT

## 2018-10-22 PROCEDURE — 80048 BASIC METABOLIC PNL TOTAL CA: CPT

## 2018-10-22 PROCEDURE — 94640 AIRWAY INHALATION TREATMENT: CPT

## 2018-10-22 PROCEDURE — 99232 SBSQ HOSP IP/OBS MODERATE 35: CPT | Performed by: INTERNAL MEDICINE

## 2018-10-22 PROCEDURE — 74011250637 HC RX REV CODE- 250/637: Performed by: INTERNAL MEDICINE

## 2018-10-22 PROCEDURE — 74011250636 HC RX REV CODE- 250/636: Performed by: HOSPITALIST

## 2018-10-22 PROCEDURE — 83735 ASSAY OF MAGNESIUM: CPT

## 2018-10-22 RX ADMIN — TORSEMIDE 20 MG: 20 TABLET ORAL at 09:17

## 2018-10-22 RX ADMIN — Medication 10 ML: at 14:02

## 2018-10-22 RX ADMIN — ONDANSETRON HYDROCHLORIDE 4 MG: 2 INJECTION INTRAMUSCULAR; INTRAVENOUS at 17:13

## 2018-10-22 RX ADMIN — ALBUTEROL SULFATE 2.5 MG: 2.5 SOLUTION RESPIRATORY (INHALATION) at 11:14

## 2018-10-22 RX ADMIN — PANTOPRAZOLE SODIUM 40 MG: 40 TABLET, DELAYED RELEASE ORAL at 09:17

## 2018-10-22 RX ADMIN — Medication 2 G: at 00:34

## 2018-10-22 RX ADMIN — ONDANSETRON HYDROCHLORIDE 4 MG: 2 INJECTION INTRAMUSCULAR; INTRAVENOUS at 00:34

## 2018-10-22 RX ADMIN — POTASSIUM CHLORIDE 20 MEQ: 20 TABLET, EXTENDED RELEASE ORAL at 09:17

## 2018-10-22 RX ADMIN — FERROUS SULFATE TAB 325 MG (65 MG ELEMENTAL FE) 325 MG: 325 (65 FE) TAB at 13:59

## 2018-10-22 RX ADMIN — METHADONE HYDROCHLORIDE 30 MG: 10 TABLET ORAL at 17:11

## 2018-10-22 RX ADMIN — FERROUS SULFATE TAB 325 MG (65 MG ELEMENTAL FE) 325 MG: 325 (65 FE) TAB at 17:11

## 2018-10-22 RX ADMIN — Medication 400 MG: at 17:11

## 2018-10-22 RX ADMIN — Medication 400 MG: at 09:17

## 2018-10-22 RX ADMIN — Medication 10 ML: at 06:00

## 2018-10-22 RX ADMIN — SPIRONOLACTONE 50 MG: 25 TABLET ORAL at 17:11

## 2018-10-22 RX ADMIN — Medication 2 G: at 09:23

## 2018-10-22 RX ADMIN — ONDANSETRON HYDROCHLORIDE 4 MG: 2 INJECTION INTRAMUSCULAR; INTRAVENOUS at 09:20

## 2018-10-22 RX ADMIN — ENOXAPARIN SODIUM 40 MG: 40 INJECTION, SOLUTION INTRAVENOUS; SUBCUTANEOUS at 09:19

## 2018-10-22 RX ADMIN — ALBUTEROL SULFATE 2.5 MG: 2.5 SOLUTION RESPIRATORY (INHALATION) at 20:00

## 2018-10-22 RX ADMIN — Medication 2 G: at 17:13

## 2018-10-22 RX ADMIN — TORSEMIDE 20 MG: 20 TABLET ORAL at 17:11

## 2018-10-22 RX ADMIN — SENNOSIDES AND DOCUSATE SODIUM 2 TABLET: 8.6; 5 TABLET ORAL at 22:18

## 2018-10-22 RX ADMIN — Medication 10 ML: at 22:19

## 2018-10-22 RX ADMIN — FERROUS SULFATE TAB 325 MG (65 MG ELEMENTAL FE) 325 MG: 325 (65 FE) TAB at 09:17

## 2018-10-22 RX ADMIN — METOPROLOL SUCCINATE 25 MG: 25 TABLET, EXTENDED RELEASE ORAL at 22:19

## 2018-10-22 RX ADMIN — ALBUTEROL SULFATE 2.5 MG: 2.5 SOLUTION RESPIRATORY (INHALATION) at 15:23

## 2018-10-22 RX ADMIN — ALBUTEROL SULFATE 2.5 MG: 2.5 SOLUTION RESPIRATORY (INHALATION) at 07:55

## 2018-10-22 RX ADMIN — METOPROLOL SUCCINATE 25 MG: 25 TABLET, EXTENDED RELEASE ORAL at 09:17

## 2018-10-22 RX ADMIN — Medication 400 MG: at 22:18

## 2018-10-22 RX ADMIN — METHADONE HYDROCHLORIDE 30 MG: 10 TABLET ORAL at 09:17

## 2018-10-22 RX ADMIN — SPIRONOLACTONE 50 MG: 25 TABLET ORAL at 09:17

## 2018-10-22 NOTE — PROGRESS NOTES
Hospitalist Progress Note   
10/22/2018 Admit Date: 10/14/2018 12:05 PM  
NAME: Chandni Serrano :  1985 MRN:  [id number] Attending: Newell Councilman, DO 
PCP:  None Admitted for:sepsis MSSA bacteremia, endocarditis, septic joint, IVDU SUBJECTIVE:  
MS Joseph this morning feels well, strong, would like to ambulate around. Denies any shortness of breath, no nausea no vomiting no chest pain, pain is controlled. No fever chills Hospital Course 28year old female with history of CHF EF 10% diagnosed , IVDU heroin, presented to the ED with worsening shortness of breath pedal edema, with on going heroin use. She had signed out of [facility]   Where she was being treated for MSSA bacteremia/endocarditis, (with suspicion of heroin use via her picc line) with end date at that time 10/14. Since then she was off her CHF medications. And had not received any antibiotics She presented with pain in hip, worsening shortness of breath, and decompensated CHF. She has been started on cefazolin here by ID with EOT . We have diuresed her 10L, and currently is on spironolactone and torsemide with on going good diuresis. Review of Systems negative with exception of pertinent positives noted above Past medical history unchanged from H&P 
 
PHYSICAL EXAM  
 
Visit Vitals /69 (BP 1 Location: Left arm, BP Patient Position: At rest;Head of bed elevated (Comment degrees)) Pulse 81 Temp 99 °F (37.2 °C) Resp 20 Ht 6' (1.829 m) Wt 65.8 kg (145 lb 1 oz) SpO2 97% Breastfeeding? No  
BMI 19.67 kg/m² Temp (24hrs), Av.6 °F (37 °C), Min:97.8 °F (36.6 °C), Max:99.4 °F (37.4 °C) Oxygen Therapy O2 Sat (%): 97 % (10/22/18 0832) Pulse via Oximetry: 100 beats per minute (10/22/18 075) O2 Device: Nasal cannula (10/22/18 0757) O2 Flow Rate (L/min): 2 l/min(decreased to 1) (10/22/18 0757) FIO2 (%): 24 % (10/21/18 1528) Intake/Output Summary (Last 24 hours) at 10/22/2018 0750 Last data filed at 10/22/2018 4751 Gross per 24 hour Intake 750 ml Output 2050 ml Net -1300 ml General: No acute distress  mucous membranes pink and moist acyanotic anicteric Neck:  Supple full range of motion Lungs:  Air entry equal bilaterally, occasional wheezes no crepitations rales rhonchi Heart:  Regular rate and rhythm,  No murmur, rub, or gallop Abdomen: Soft, Non distended, Non tender, no rebound guarding Positive bowel sounds Extremities: No cyanosis, clubbing. pedal edema 2+ Neurologic:  No focal deficits Musculoskeletal: no   Joint swelling tenderness erythema Skin:  No erythema, rashes noted LAB No results for input(s): GLUCPOC in the last 72 hours. No lab exists for component: [place] Recent Labs 10/22/18 
8579 WBC 9.3 HGB 8.0*  
HCT 28.8*  
 Recent Labs 10/22/18 
2970 * K 4.5  
CL 88* CO2 37*  BUN 20  
CREA 1.16* MG 2.0  
CA 8.6 PHOS 2.4* EKG and imaging reviewed personally by me No results found. Results for orders placed or performed during the hospital encounter of 10/14/18 EKG, 12 LEAD, INITIAL Result Value Ref Range Ventricular Rate 114 BPM  
 Atrial Rate 114 BPM  
 P-R Interval 166 ms  
 QRS Duration 112 ms  
 Q-T Interval 330 ms QTC Calculation (Bezet) 454 ms Calculated P Axis 47 degrees Calculated R Axis 88 degrees Calculated T Axis -26 degrees Diagnosis Sinus tachycardia Biatrial enlargement ST & T wave abnormality, consider inferolateral ischemia Abnormal ECG When compared with ECG of 30-AUG-2018 00:34, Fusion complexes are no longer Present T wave inversion less evident in Anterolateral leads Confirmed by Jordan Garcia ([id number]) on 10/14/2018 6:44:05 PM 
  
 
XR Results (most recent): 
Results from [facility] Encounter encounter on 10/14/18 XR CHEST SNGL V  
 Narrative AP chest radiograph History: CHF, 28 years Female Comparison: Chest radiograph October 16, 2018 Findings:   Partially obscured cardiomediastinal silhouette. Persistent low 
lung volumes. Persistent moderate right and trace left pleural effusions with 
associated bibasilar atelectasis and or consolidation. Probable mild 
interstitial edema unchanged. No evidence of pneumothorax. Visualized soft 
tissue and osseous structures otherwise unremarkable. Impression Impression:  No significant interval change. Active problems Active Hospital Problems Diagnosis Date Noted  Hypoproteinemia ([address] 10/17/2018  Pleural effusion 10/16/2018  Hypomagnesemia 10/16/2018  Hypokalemia 10/16/2018  Sepsis ([address] 10/14/2018  MSSA bacteremia 10/14/2018  Septic arthritis ([address] 10/14/2018  Prolonged Q-T interval on ECG 10/14/2018  Transaminitis 10/14/2018  Hyponatremia 10/14/2018  Systolic CHF, acute on chronic ([address] 08/30/2018  Heroin abuse ([address] 04/10/2018 ASSESSMENT  AND PLAN  
  
· Sepsis due to MSSA bacteremia and endocarditis, with septic pulmonary emboli - end of treatment 11/5/2018 will continue cefazolin. · Septic arthritis right sacroiliac joint - continue as above pain is controlled · CHF systolic Heart failure, acute on chronic - EF of 10% - currently doing well continues to diurese with oral toresemide and spironolactone continue BB, and ace,. Bilateral pleural effusions followed by pulmonary, plan for repeat chest xray tomorrow · Polysubstance abuse IV heroin - follow up urine drug screen Ms Ryan Jones however remains steadfast about leaving hospital for her parole appointment 10/25 she will likely have to Sign AMA, picc line will be removed prior to her leaving, and she will have to return through the ED unfortunately. · Chronic pain/withdrawal - seems to be doing well with methadone at current dose. Will continue to monitor closely · Hyponatremia stable - will transition to oral diuretic, will continue to monitor otherwise stable · Moderate protein malnutrition - continue supplementation as per dietary · Patient has sitter but this may be stopped with close monitoring, and no visitors. Or as hospital protocol deemed necessary to ensure patient safety while having picc line DVT Prophylaxis: lmwh Signed By: Jose Francisco MD   
 October 22, 2018

## 2018-10-22 NOTE — PROGRESS NOTES
Problem: Interdisciplinary Rounds Goal: Interdisciplinary Rounds Interdisciplinary team rounds were held 10/22/2018 with the following team members:Care Management, Nursing, Nutrition, Pharmacy, Physical Therapy and Physician. Plan of care discussed. See clinical pathway and/or care plan for interventions and desired outcomes.

## 2018-10-22 NOTE — PROGRESS NOTES
Pt resting in bed and is alert and oriented x 4. She denies pain and is on 1 L NC. RR even and unlabored. Call light in reach and pt instructed to call for assistance if needed. Will monitor. Sitter at bedside.

## 2018-10-22 NOTE — PROGRESS NOTES
Aleksandra Cole Admission Date: 10/14/2018 Daily Progress Note: 10/22/2018 The patient's chart is reviewed and the patient is discussed with the staff. Subjective:  
 
33 y.o.  female seen and evaluated at the request of Dr. Yumiko Anaya. She was admitted 10/14 with   cc of SOB and LE edema. 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 Patient originally treated for bacteremia/endocarditis at [facility] with nafcillin ending treatment 10/22/18. Patient used IV heroin via her PICC line while at [facility] and left AMA once they discontinued her PICC line. Once discharged, patient had no follow up and has not been taking her CHF medications until last week.   
  
Over the past few weeks, she has had increased SOB and LE edema. Admits to using IV heroinPTA. No new skin lesions.  She has been in icu rxed for sepsis.  Cards and ID are to see the pt. And she is getting lasix.  And brianne ancef for mssa endocarditis Had thoracentesis for 1000 ml ss fluid on 10/16/18  
I/o 1550 out 10/18 Doing better with less edema-[id number] negative since admit Current Facility-Administered Medications Medication Dose Route Frequency  torsemide (DEMADEX) tablet 20 mg  20 mg Oral BID  potassium chloride (K-DUR, KLOR-CON) SR tablet 20 mEq  20 mEq Oral DAILY  spironolactone (ALDACTONE) tablet 50 mg  50 mg Oral BID  methadone (DOLOPHINE) tablet 30 mg  30 mg Oral BID  promethazine (PHENERGAN) with saline injection 12.5 mg  12.5 mg IntraVENous Q4H PRN  
 albuterol (PROVENTIL VENTOLIN) nebulizer solution 2.5 mg  2.5 mg Nebulization QID RT  
 ondansetron (ZOFRAN) injection 4 mg  4 mg IntraVENous Q4H PRN  pantoprazole (PROTONIX) tablet 40 mg  40 mg Oral ACB  metoprolol succinate (TOPROL-XL) XL tablet 25 mg  25 mg Oral BID  
 NUTRITIONAL SUPPORT ELECTROLYTE PRN ORDERS   Does Not Apply PRN  
 lisinopril (PRINIVIL, ZESTRIL) tablet 2.5 mg  2.5 mg Oral DAILY  enoxaparin (LOVENOX) injection 40 mg  40 mg SubCUTAneous Q24H  
 magnesium oxide (MAG-OX) tablet 400 mg  400 mg Oral TID  ferrous sulfate tablet 325 mg  1 Tab Oral TID WITH MEALS  senna-docusate (PERICOLACE) 8.6-50 mg per tablet 2 Tab  2 Tab Oral QHS  polyethylene glycol (MIRALAX) packet 17 g  17 g Oral DAILY  magnesium hydroxide (MILK OF MAGNESIA) 400 mg/5 mL oral suspension 30 mL  30 mL Oral DAILY PRN  
 sodium chloride (NS) flush 10 mL  10 mL InterCATHeter Q8H  
 sodium chloride (NS) flush 10 mL  10 mL InterCATHeter PRN  
 sodium chloride (NS) flush 5-10 mL  5-10 mL IntraVENous Q8H  
 sodium chloride (NS) flush 5-10 mL  5-10 mL IntraVENous PRN  
 acetaminophen (TYLENOL) tablet 650 mg  650 mg Oral Q4H PRN  
 ceFAZolin (ANCEF) 2 g/20 mL in sterile water IV syringe  2 g IntraVENous Q8H Review of Systems Constitutional: negative for fever, chills, sweats Cardiovascular: negative for chest pain, palpitations, syncope, edema Gastrointestinal:  negative for dysphagia, reflux, vomiting, diarrhea, abdominal pain, or melena Neurologic:  negative for focal weakness, numbness, headache Objective:  
 
Vitals:  
 10/21/18 1950 10/22/18 0017 10/22/18 0405 10/22/18 9069 BP:  96/63 102/69 Pulse:  62 81 Resp:  20 20 Temp:  98.2 °F (36.8 °C) 98.1 °F (36.7 °C) SpO2: 92% 93% 97% 99% Weight:      
Height:      
 
Intake and Output:  
10/20 1901 - 10/22 0700 In: 1150 [P.O.:950; I.V.:200] Out: Kaz Zamora [[id number]] No intake/output data recorded. Physical Exam:  
Constitution:  the patient is well developed and in no acute distress EENMT:  Sclera clear, pupils equal, oral mucosa moist 
Respiratory: crackles bilateral 
Cardiovascular:  RRR without M,G,R 
 Gastrointestinal: soft and non-tender; with positive bowel sounds. Musculoskeletal: warm without cyanosis. There is decreased lower leg edema. Skin:  no jaundice or rashes, no wounds Neurologic: no gross neuro deficits Psychiatric:  alert and oriented x 3 CXR:  
 
 
LAB No results for input(s): GLUCPOC in the last 72 hours. No lab exists for component: [place] Recent Labs 10/22/18 
4424 10/21/18 
0602 10/20/18 
7489 WBC 9.3 9.5 8.8 HGB 8.0* 8.1* 7.8* HCT 28.8* 28.3* 26.7*  
 405 353 Recent Labs 10/22/18 
9916 10/21/18 
0602 10/20/18 
3807 * 129* 128* K 4.5 4.4 4.3 CL 88* 88* 87* CO2 37* 34* 35*  83 87 BUN 20 25* 26* CREA 1.16* 1.07* 1.10* MG 2.0 1.9 2.0  
CA 8.6 8.7 8.6 PHOS 2.4* 2.6 2.6 No results for input(s): PH, PCO2, PO2, HCO3 in the last 72 hours. No results for input(s): LCAD, LAC in the last 72 hours. Assessment:  (Medical Decision Making) Hospital Problems  Never Reviewed Codes Class Noted POA Hypoproteinemia ([facility] ICD-10-CM: E77.8 ICD-9-CM: 273.8  10/17/2018 Unknown Pleural effusion ICD-10-CM: J90 ICD-9-CM: 511.9  10/16/2018 Unknown Hypomagnesemia ICD-10-CM: G07.76 
ICD-9-CM: 275.2  10/16/2018 Unknown Hypokalemia ICD-10-CM: E87.6 ICD-9-CM: 276.8  10/16/2018 Unknown * (Principal) Sepsis ([facility] ICD-10-CM: A41.9 ICD-9-CM: 038.9, 995.91  10/14/2018 Unknown MSSA bacteremia ICD-10-CM: R78.81 ICD-9-CM: 790.7, 041.11  10/14/2018 Unknown Septic arthritis ([address] ICD-10-CM: M00.9 ICD-9-CM: 711.00  10/14/2018 Unknown Prolonged Q-T interval on ECG ICD-10-CM: R94.31 
ICD-9-CM: 794.31  10/14/2018 Unknown Transaminitis ICD-10-CM: R74.0 ICD-9-CM: 790.4  10/14/2018 Unknown Hyponatremia ICD-10-CM: E87.1 ICD-9-CM: 276.1  10/14/2018 Unknown Systolic CHF, acute on chronic (HCC) ICD-10-CM: K91.39 ICD-9-CM: 428.23, 428.0  8/30/2018 Yes Heroin abuse ([facility] ICD-10-CM: F11.10 ICD-9-CM: 305.50  4/10/2018 Yes Plan:  (Medical Decision Making) 1   Repeat cxr tomorrow 2   Continue diuresis-demadex and spironolactone 
-- 
 
More than 50% of the time documented was spent in face-to-face contact with the patient and in the care of the patient on the floor/unit where the patient is located.  
 
Loyda Vincent MD

## 2018-10-22 NOTE — PROGRESS NOTES
Problem: Nutrition Deficit Goal: *Optimize nutritional status Nutrition Follow Up: 
Reason for initial assessment: BPA - Malnutrition Screening Tool positive for unintended weight loss > 33# and eating poorly r/t decreased appetite Consult received 10/16 for General Nutrition Management and Supplement per Dr. Darling Morrissey.  
  
Assessment:  
Admitted with sepsis, heroin abuse, systolic CHF, MSSA bacteremia. PMH: CHF, IV drug abuse, R sacroiliac arthritis, MSSA/endocarditis. Current Diet:  Cardiac with 1800 ml fluid restriction/day Pt states she ate 100% breakfast this am and generally consumes ~ 40% lunch and supper; states her appetite decreases later in the day; pt has been drinking 2 Ensure Enlive/day. Pt states she is having to adjust to a cardiac diet and does not care for the [facility] PAR sent on meal trays to replace salt packets on tray. Pertinent Labs:  Hyponatremia, hypophosphatemia; Pt on electrolyte replacement protocols  
  
Anthropometrics:Height: 6' (182.9 cm),  Weight: 88.5 kg (195 lb), unspecified, Body mass index is 26.45 kg/(m^2). BMI class of overweight. Weight 10/16:  89.4 kg-weight source not specified. Pt with 2+ LE edema bilaterally.   Limited validity of BMI secondary significant edema. Weight 10/18:  86.5 kg-bed source Weight 10/19:  65.8 kg-bed source  Pt with pedal edema 2+ NFPE: Mild clavicle muscle loss.   
Malnutrition Criteria: ASPEN Malnutrition Criteria Acute Illness, Chronic Illness, or Social/Enviornmental: Social/environmental illness Weight Loss: 7.5% x 3 month Muscle Mass: Mild Fluid Accumulation: Severe ASPEN Malnutrition Score - Social/Environmental Illness: 8 Social/Environmental Illness - Malnutrition Diagnosis: Moderate malnutrition  
  
Macronutrient needs: Wt used: 73kg EER:  4350-5595 kcal /day (25-30 kcal/kg ideal BW) EPR:  73-88 grams protein/day (1-1.2 grams/kg ideal BW) 
  
Intake/Comparative Standards:  No recorded intake since 10/16.   Based on verbalized intake + 2 Ensure Enlive / day, patient potentially meets % estimated kcal and protein needs. .  
  
Intervention: 
Meals and snacks: Continue current diet. Encouraged patient to order \"breakfast\" type foods for evening meal if she prefers that type of food. Nutrition supplement therapy:  Continue Ensure Enlive 2 X daily; flavor preference noted.   
Ordered Weight  
Discharge Nutrition Plan: Too soon to determine. 
  
Ly Matthews, RD, LD, MPH 
[phone]

## 2018-10-23 ENCOUNTER — APPOINTMENT (OUTPATIENT)
Dept: GENERAL RADIOLOGY | Age: 33
DRG: 871 | End: 2018-10-23
Attending: INTERNAL MEDICINE
Payer: SUBSIDIZED

## 2018-10-23 PROBLEM — J96.00 ACUTE RESPIRATORY FAILURE (HCC): Status: ACTIVE | Noted: 2018-10-23

## 2018-10-23 PROBLEM — I33.0 ENDOCARDITIS DUE TO METHICILLIN SUSCEPTIBLE STAPHYLOCOCCUS AUREUS (MSSA): Status: ACTIVE | Noted: 2018-10-23

## 2018-10-23 PROBLEM — B95.61 ENDOCARDITIS DUE TO METHICILLIN SUSCEPTIBLE STAPHYLOCOCCUS AUREUS (MSSA): Status: ACTIVE | Noted: 2018-10-23

## 2018-10-23 LAB
ANION GAP SERPL CALC-SCNC: 5 MMOL/L
BASOPHILS # BLD: 0.1 K/UL (ref 0–0.2)
BASOPHILS NFR BLD: 1 % (ref 0–2)
BUN SERPL-MCNC: 22 MG/DL (ref 6–23)
CALCIUM SERPL-MCNC: 8.9 MG/DL (ref 8.3–10.4)
CHLORIDE SERPL-SCNC: 88 MMOL/L (ref 98–107)
CO2 SERPL-SCNC: 38 MMOL/L (ref 21–32)
CREAT SERPL-MCNC: 1.19 MG/DL (ref 0.6–1)
DIFFERENTIAL METHOD BLD: ABNORMAL
EOSINOPHIL # BLD: 0.1 K/UL (ref 0–0.8)
EOSINOPHIL NFR BLD: 1 % (ref 0.5–7.8)
ERYTHROCYTE [DISTWIDTH] IN BLOOD BY AUTOMATED COUNT: 21.1 %
GLUCOSE SERPL-MCNC: 88 MG/DL (ref 65–100)
HCT VFR BLD AUTO: 30.4 % (ref 35.8–46.3)
HGB BLD-MCNC: 8.4 G/DL (ref 11.7–15.4)
IMM GRANULOCYTES # BLD: 0.1 K/UL (ref 0–0.5)
IMM GRANULOCYTES NFR BLD AUTO: 1 % (ref 0–5)
LYMPHOCYTES # BLD: 2.7 K/UL (ref 0.5–4.6)
LYMPHOCYTES NFR BLD: 27 % (ref 13–44)
MAGNESIUM SERPL-MCNC: 1.9 MG/DL (ref 1.8–2.4)
MCH RBC QN AUTO: 25.4 PG (ref 26.1–32.9)
MCHC RBC AUTO-ENTMCNC: 27.6 G/DL (ref 31.4–35)
MCV RBC AUTO: 91.8 FL (ref 79.6–97.8)
MONOCYTES # BLD: 1 K/UL (ref 0.1–1.3)
MONOCYTES NFR BLD: 11 % (ref 4–12)
NEUTS SEG # BLD: 6 K/UL (ref 1.7–8.2)
NEUTS SEG NFR BLD: 60 % (ref 43–78)
NRBC # BLD: 0.12 K/UL (ref 0–0.2)
PHOSPHATE SERPL-MCNC: 3.1 MG/DL (ref 2.5–4.5)
PLATELET # BLD AUTO: 439 K/UL (ref 150–450)
PMV BLD AUTO: 9.3 FL (ref 9.4–12.3)
POTASSIUM SERPL-SCNC: 4.5 MMOL/L (ref 3.5–5.1)
RBC # BLD AUTO: 3.31 M/UL (ref 4.05–5.2)
SODIUM SERPL-SCNC: 131 MMOL/L (ref 136–145)
WBC # BLD AUTO: 9.9 K/UL (ref 4.3–11.1)

## 2018-10-23 PROCEDURE — 83735 ASSAY OF MAGNESIUM: CPT

## 2018-10-23 PROCEDURE — 99232 SBSQ HOSP IP/OBS MODERATE 35: CPT | Performed by: INTERNAL MEDICINE

## 2018-10-23 PROCEDURE — 94760 N-INVAS EAR/PLS OXIMETRY 1: CPT

## 2018-10-23 PROCEDURE — 85025 COMPLETE CBC W/AUTO DIFF WBC: CPT

## 2018-10-23 PROCEDURE — 74011250636 HC RX REV CODE- 250/636: Performed by: FAMILY MEDICINE

## 2018-10-23 PROCEDURE — 80048 BASIC METABOLIC PNL TOTAL CA: CPT

## 2018-10-23 PROCEDURE — 84100 ASSAY OF PHOSPHORUS: CPT

## 2018-10-23 PROCEDURE — 74011250637 HC RX REV CODE- 250/637: Performed by: HOSPITALIST

## 2018-10-23 PROCEDURE — 74011250637 HC RX REV CODE- 250/637: Performed by: INTERNAL MEDICINE

## 2018-10-23 PROCEDURE — 94640 AIRWAY INHALATION TREATMENT: CPT

## 2018-10-23 PROCEDURE — 74011000250 HC RX REV CODE- 250: Performed by: INTERNAL MEDICINE

## 2018-10-23 PROCEDURE — 65270000029 HC RM PRIVATE

## 2018-10-23 PROCEDURE — 74011250636 HC RX REV CODE- 250/636: Performed by: NURSE PRACTITIONER

## 2018-10-23 PROCEDURE — 77010033678 HC OXYGEN DAILY

## 2018-10-23 PROCEDURE — 71046 X-RAY EXAM CHEST 2 VIEWS: CPT

## 2018-10-23 PROCEDURE — 36415 COLL VENOUS BLD VENIPUNCTURE: CPT

## 2018-10-23 PROCEDURE — 74011000250 HC RX REV CODE- 250: Performed by: HOSPITALIST

## 2018-10-23 PROCEDURE — 74011250636 HC RX REV CODE- 250/636: Performed by: HOSPITALIST

## 2018-10-23 PROCEDURE — 94762 N-INVAS EAR/PLS OXIMTRY CONT: CPT

## 2018-10-23 PROCEDURE — 94761 N-INVAS EAR/PLS OXIMETRY MLT: CPT

## 2018-10-23 RX ORDER — CEFAZOLIN SODIUM/WATER 2 G/20 ML
2 SYRINGE (ML) INTRAVENOUS EVERY 8 HOURS
Status: CANCELLED | OUTPATIENT
Start: 2018-10-24 | End: 2018-11-05

## 2018-10-23 RX ADMIN — ALBUTEROL SULFATE 2.5 MG: 2.5 SOLUTION RESPIRATORY (INHALATION) at 11:40

## 2018-10-23 RX ADMIN — ALBUTEROL SULFATE 2.5 MG: 2.5 SOLUTION RESPIRATORY (INHALATION) at 07:45

## 2018-10-23 RX ADMIN — Medication 400 MG: at 17:20

## 2018-10-23 RX ADMIN — METHADONE HYDROCHLORIDE 30 MG: 10 TABLET ORAL at 08:33

## 2018-10-23 RX ADMIN — Medication 10 ML: at 22:34

## 2018-10-23 RX ADMIN — Medication 2 G: at 00:04

## 2018-10-23 RX ADMIN — ONDANSETRON HYDROCHLORIDE 4 MG: 2 INJECTION INTRAMUSCULAR; INTRAVENOUS at 23:57

## 2018-10-23 RX ADMIN — SPIRONOLACTONE 50 MG: 25 TABLET ORAL at 08:41

## 2018-10-23 RX ADMIN — METOPROLOL SUCCINATE 25 MG: 25 TABLET, EXTENDED RELEASE ORAL at 08:49

## 2018-10-23 RX ADMIN — ALBUTEROL SULFATE 2.5 MG: 2.5 SOLUTION RESPIRATORY (INHALATION) at 15:35

## 2018-10-23 RX ADMIN — METOPROLOL SUCCINATE 25 MG: 25 TABLET, EXTENDED RELEASE ORAL at 22:30

## 2018-10-23 RX ADMIN — Medication 10 ML: at 05:15

## 2018-10-23 RX ADMIN — FERROUS SULFATE TAB 325 MG (65 MG ELEMENTAL FE) 325 MG: 325 (65 FE) TAB at 17:20

## 2018-10-23 RX ADMIN — Medication 2 G: at 17:21

## 2018-10-23 RX ADMIN — ALBUTEROL SULFATE 2.5 MG: 2.5 SOLUTION RESPIRATORY (INHALATION) at 20:02

## 2018-10-23 RX ADMIN — Medication 2 G: at 23:51

## 2018-10-23 RX ADMIN — ENOXAPARIN SODIUM 40 MG: 40 INJECTION, SOLUTION INTRAVENOUS; SUBCUTANEOUS at 08:29

## 2018-10-23 RX ADMIN — TORSEMIDE 20 MG: 20 TABLET ORAL at 17:20

## 2018-10-23 RX ADMIN — Medication 2 G: at 08:28

## 2018-10-23 RX ADMIN — ONDANSETRON HYDROCHLORIDE 4 MG: 2 INJECTION INTRAMUSCULAR; INTRAVENOUS at 17:25

## 2018-10-23 RX ADMIN — SODIUM CHLORIDE 12.5 MG: 9 INJECTION INTRAMUSCULAR; INTRAVENOUS; SUBCUTANEOUS at 15:38

## 2018-10-23 RX ADMIN — PANTOPRAZOLE SODIUM 40 MG: 40 TABLET, DELAYED RELEASE ORAL at 08:50

## 2018-10-23 RX ADMIN — SENNOSIDES AND DOCUSATE SODIUM 2 TABLET: 8.6; 5 TABLET ORAL at 22:33

## 2018-10-23 RX ADMIN — METHADONE HYDROCHLORIDE 30 MG: 10 TABLET ORAL at 17:20

## 2018-10-23 RX ADMIN — ONDANSETRON HYDROCHLORIDE 4 MG: 2 INJECTION INTRAMUSCULAR; INTRAVENOUS at 00:14

## 2018-10-23 RX ADMIN — POTASSIUM CHLORIDE 20 MEQ: 20 TABLET, EXTENDED RELEASE ORAL at 08:33

## 2018-10-23 RX ADMIN — Medication 400 MG: at 22:33

## 2018-10-23 RX ADMIN — TORSEMIDE 20 MG: 20 TABLET ORAL at 08:34

## 2018-10-23 RX ADMIN — Medication 400 MG: at 08:49

## 2018-10-23 RX ADMIN — ONDANSETRON HYDROCHLORIDE 4 MG: 2 INJECTION INTRAMUSCULAR; INTRAVENOUS at 08:30

## 2018-10-23 RX ADMIN — SPIRONOLACTONE 50 MG: 25 TABLET ORAL at 17:20

## 2018-10-23 RX ADMIN — FERROUS SULFATE TAB 325 MG (65 MG ELEMENTAL FE) 325 MG: 325 (65 FE) TAB at 08:38

## 2018-10-23 NOTE — PROGRESS NOTES
Shift assessment complete. Pt is A&O x 4. Denies pain. No needs voiced at this time. No distress noted. Respirations even and unlabored. Edema to bilateral lower extremities. Sitter at bedside. Call light within reach.

## 2018-10-23 NOTE — PROGRESS NOTES
Oxygen Qualifier Room air: SpO2 with O2 and liter flow Resting SpO2  85%  91% on 1L Ambulating SpO2  84% 97% on 3L Pt did complain of fatigue. Had to stop several times to rest. Otherwise no other complications. Completed by:  
 
Gabino Covarrubias, RT

## 2018-10-23 NOTE — PROGRESS NOTES
AM assessment completed. PT sitting in chair with normal and unlabored respirations and 3L NC. Denies any pain. Midline is CDI call light is within reach. Will continue to monitor.

## 2018-10-23 NOTE — PROGRESS NOTES
Problem: Interdisciplinary Rounds Goal: Interdisciplinary Rounds Interdisciplinary team rounds were held 10/23/2018 with the following team members:Care Management, Nursing, Nutrition, Pharmacy, Physical Therapy and Physician. Plan of care discussed. See clinical pathway and/or care plan for interventions and desired outcomes.

## 2018-10-23 NOTE — PROGRESS NOTES
Hospitalist Progress Note Patient: Camille Vallejo MRN: [id number]  SSN: [id number] YOB: 1985  Age: 28 y.o. Sex: female Admit Date: 10/14/2018 LOS: 9 days Subjective:  
 
From previous notes: \"29 year old female with history of CHF EF 10% diagnosed 4/18, IVDU heroin, presented to the ED with worsening shortness of breath pedal edema, with on going heroin use. She had signed out of [facility]  9/28 Where she was being treated for MSSA bacteremia/endocarditis, (with suspicion of heroin use via her picc line) with end date at that time 10/14. Since then she was off her CHF medications. And had not received any antibiotics She presented with pain in hip, worsening shortness of breath, and decompensated CHF. She has been started on cefazolin here by ID with EOT 11/5. We have diuresed her 10L, and currently is on spironolactone and torsemide with on going good diuresis. \" 
 
10/23 - Patient is upset about her sitter. She states she has not been using drugs despite a positive UDS and admitted to using heroin to other physicians. Denies CP/SOB. Still with BLE edema. Denies F/C/N/V. Review of systems negative except stated above. Objective:  
 
Visit Vitals /61 Pulse 89 Temp 96 °F (35.6 °C) Resp 18 Ht 6' (1.829 m) Wt 86 kg (189 lb 9.6 oz) SpO2 96% Breastfeeding? No  
BMI 25.71 kg/m² Oxygen Therapy O2 Sat (%): 96 % (10/23/18 0818) Pulse via Oximetry: 91 beats per minute (10/23/18 0745) O2 Device: Nasal cannula(weaned to RA per ) (10/23/18 0745) O2 Flow Rate (L/min): 3 l/min (10/22/18 2001) FIO2 (%): 24 % (10/21/18 1528) Intake and Output:  
 
Intake/Output Summary (Last 24 hours) at 10/23/2018 1123 Last data filed at 10/23/2018 0003 Gross per 24 hour Intake  Output 1850 ml Net -1850 ml Physical Exam:  
GENERAL: alert, cooperative, no distress, appears stated age EYE: conjunctivae/corneas clear. PERRL. THROAT & NECK: normal and no erythema or exudates noted. LUNG: clear to auscultation bilaterally HEART: regular rate and rhythm, S1S2, no murmur, no JVD ABDOMEN: soft, non-tender, non-distended. Bowel sounds normal.  
EXTREMITIES:  2+ BLE edema, 2+ pedal/radial pulses bilaterally SKIN: no rash or abnormalities NEUROLOGIC: A&Ox3. Cranial nerves 2-12 grossly intact. Lab/Data Review: 
Recent Results (from the past 24 hour(s)) MAGNESIUM Collection Time: 10/23/18  6:07 AM  
Result Value Ref Range Magnesium 1.9 1.8 - 2.4 mg/dL PHOSPHORUS Collection Time: 10/23/18  6:07 AM  
Result Value Ref Range Phosphorus 3.1 2.5 - 4.5 MG/DL  
CBC WITH AUTOMATED DIFF Collection Time: 10/23/18  6:07 AM  
Result Value Ref Range WBC 9.9 4.3 - 11.1 K/uL  
 RBC 3.31 (L) 4.05 - 5.2 M/uL HGB 8.4 (L) 11.7 - 15.4 g/dL HCT 30.4 (L) 35.8 - 46.3 % MCV 91.8 79.6 - 97.8 FL  
 MCH 25.4 (L) 26.1 - 32.9 PG  
 MCHC 27.6 (L) 31.4 - 35.0 g/dL RDW 21.1 % PLATELET 604 166 - 933 K/uL MPV 9.3 (L) 9.4 - 12.3 FL ABSOLUTE NRBC 0.12 0.0 - 0.2 K/uL  
 DF AUTOMATED NEUTROPHILS 60 43 - 78 % LYMPHOCYTES 27 13 - 44 % MONOCYTES 11 4.0 - 12.0 % EOSINOPHILS 1 0.5 - 7.8 % BASOPHILS 1 0.0 - 2.0 % IMMATURE GRANULOCYTES 1 0.0 - 5.0 %  
 ABS. NEUTROPHILS 6.0 1.7 - 8.2 K/UL  
 ABS. LYMPHOCYTES 2.7 0.5 - 4.6 K/UL  
 ABS. MONOCYTES 1.0 0.1 - 1.3 K/UL  
 ABS. EOSINOPHILS 0.1 0.0 - 0.8 K/UL  
 ABS. BASOPHILS 0.1 0.0 - 0.2 K/UL  
 ABS. IMM. GRANS. 0.1 0.0 - 0.5 K/UL METABOLIC PANEL, BASIC Collection Time: 10/23/18  6:07 AM  
Result Value Ref Range Sodium 131 (L) 136 - 145 mmol/L Potassium 4.5 3.5 - 5.1 mmol/L Chloride 88 (L) 98 - 107 mmol/L  
 CO2 38 (H) 21 - 32 mmol/L Anion gap 5 mmol/L Glucose 88 65 - 100 mg/dL BUN 22 6 - 23 MG/DL Creatinine 1.19 (H) 0.6 - 1.0 MG/DL  
 GFR est AA >60 >60 ml/min/1.73m2 GFR est non-AA 55 ml/min/1.73m2 Calcium 8.9 8.3 - 10.4 MG/DL Imaging: Xr Chest Sngl V Result Date: 10/18/2018 Impression:  No significant interval change. Xr Chest Pa Lat Result Date: 10/23/2018 IMPRESSION: Bilateral pleural effusions with lower lobe atelectasis or consolidation. These may be complex effusions. Xr Chest Pa Lat Result Date: 10/14/2018 IMPRESSION: Cardiomegaly and large pleural effusions, right more than left. Pleural effusions are new since 30 August 2018 Xr Chest [facility] Result Date: 10/16/2018 IMPRESSION: Suspected pleural effusions with bilateral lower lobe atelectasis or consolidation. No results found for this visit on 10/14/18. Cultures: All Micro Results Procedure Component Value Units Date/Time CULTURE, BLOOD [[id number]] Collected:  10/17/18 1321 Order Status:  Completed Specimen:  Blood Updated:  10/22/18 0815 Special Requests: --     
  RIGHT 
HAND Culture result: NO GROWTH 5 DAYS     
 CULTURE, BLOOD [[id number]] Collected:  10/17/18 1319 Order Status:  Completed Specimen:  Blood Updated:  10/22/18 0815 Special Requests: --     
  RIGHT 
HAND Culture result: NO GROWTH 5 DAYS     
 CULTURE, BLOOD [[id number]] Collected:  10/15/18 2233 Order Status:  Completed Specimen:  Blood Updated:  10/20/18 1150 Special Requests: RIGHT ANTECUBITAL Culture result: NO GROWTH 5 DAYS     
 CULTURE, BLOOD [[id number]] Collected:  10/15/18 2235 Order Status:  Completed Specimen:  Blood Updated:  10/20/18 1150 Special Requests: RIGHT ARM Culture result: NO GROWTH 5 DAYS FUNGUS CULTURE AND SMEAR [[id number]] Collected:  10/16/18 3322 Order Status:  Completed Specimen:  Other from Miscellaneous sample Updated:  10/19/18 1138 Source THORACENTESIS Comment: RIGHT Corrected on 10/16 AT 0951: This is a correction to the medical record of the test results previously reported as THORACENTESIS Fungus stain Direct Inoculation Fungus (Mycology) Culture Other source received Comment: (NOTE) Performed At: [address] [id number] Óscar Mansfield MD RT:[id number] CULTURE, BLOOD [[id number]] Collected:  10/14/18 1250 Order Status:  Completed Specimen:  Whole Blood Updated:  10/19/18 0809 Special Requests: --     
  RIGHT 
HAND Culture result: NO GROWTH 5 DAYS     
 CULTURE, BLOOD [[id number]] Collected:  10/14/18 1251 Order Status:  Completed Specimen:  Whole Blood Updated:  10/19/18 0809 Special Requests: --     
  HAND 
LEFT Culture result: NO GROWTH 5 DAYS     
 CULTURE, BODY FLUID W Tarik Blanton [[id number]] Collected:  10/16/18 1733 Order Status:  Completed Specimen:  Thoracentesis Updated:  10/18/18 9251 Special Requests: NO SPECIAL REQUESTS     
  GRAM STAIN 0 TO 10 WBC'S/OIF  
   NO DEFINITE ORGANISM SEEN Culture result: NO GROWTH 2 DAYS     
 AFB CULTURE + SMEAR W/RFLX ID FROM CULTURE [[id number]] Collected:  10/16/18 0951 Order Status:  Completed Specimen:  Miscellaneous sample Updated:  10/17/18 1736 Source THORACENTESIS Comment: RIGHT Corrected on 10/16 AT 0951: This is a correction to the medical record of the test results previously reported as THORACENTESIS 
  
  
  AFB Specimen processing Concentration Acid Fast Smear NEGATIVE Comment: (NOTE) Performed At: [address] [id number] Óscar Mansfield MD ON:[id number] Acid Fast Culture PENDING  
 CULTURE, BLOOD [[id number]] Collected:  10/14/18 1300 Order Status:  Canceled Specimen:  Whole Blood Assessment/Plan:  
 
Principal Problem: 
  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 
- Continue Cefazolin - EOT 11/5/18 Active Problems: MSSA bacteremia (10/14/2018) - Due to IVDU 
- Continue Cefazolin - EOT 11/5/18 Sepsis ([address] (10/14/2018) - Due to #1 
- Resolved with abx Septic arthritis ([address] (10/14/2018) - Improving 
- Continue Cefazolin Systolic CHF, acute on chronic ([address] (8/30/2018) - EF 10% 
- Continue Demadex 20mg BID 
- Continue Spironolactone 50mg BID 
- Continue Toprol 
- Continue Lisinopril Acute respiratory failure ([address] (10/23/2018) - Improving with duresis - Oxygen qualifier today showed she still needs oxygen - Pulmonary following Prolonged Q-T interval on ECG (10/14/2018) Hyponatremia (10/14/2018) - Resolved Pleural effusion (10/16/2018) - Persistent --> may be complex - Pulmonary following Hypomagnesemia (10/16/2018) - Mag 1.9 
- Continue to follow Hypokalemia (10/16/2018) - Resolved Heroin abuse ([address] (4/10/2018) - Continue Methadone 
- CANNOT leave with PICC line Transaminitis (10/14/2018) - Resolved Hypoproteinemia ([address] (10/17/2018) - Due to drug use and lack of appetite Dispo - Continue Cefazolin EOT 11/5/18. Continue diuresis. Patient adamant she is leaving on 10/25 for a parole hearing. DIET NUTRITIONAL SUPPLEMENTS Dinner, Lunch; Ensure [employer] DIET CARDIAC Regular; FR 1800ML DVT Prophylaxis: Lovenox Signed By: Miladis Lofton,  October 23, 2018

## 2018-10-23 NOTE — PROGRESS NOTES
Daylin Gasca Admission Date: 10/14/2018 Daily Progress Note: 10/23/2018 The patient's chart is reviewed and the patient is discussed with the staff. Subjective:  
33 y.o.  female seen and evaluated at the request of Dr. Jessy Tong. She was admitted 10/14 with   cc of SOB and LE edema. 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 Patient originally treated for bacteremia/endocarditis at [facility] with nafcillin ending treatment 10/22/18. Patient used IV heroin via her PICC line while at [facility] and left AMA once they discontinued her PICC line. Once discharged, patient had no follow up and has not been taking her CHF medications until last week.   
  
Over the past few weeks, she has had increased SOB and LE edema. Admits to using IV heroinPTA. No new skin lesions.  She has been in icu rxed for sepsis.  Cards and ID are to see the pt. And she is getting lasix.  And brianne ancef for mssa endocarditis Had thoracentesis for 1000 ml ss fluid on 10/16/18  
I/o 1550 out 10/18 Doing better with less edema-[id number] negative since admit Currently on 3 L O2 but walked yesterday on ra-sat was not checked. 2660 out yesterday Apparently she has to leave on Thursday for an appt. Current Facility-Administered Medications Medication Dose Route Frequency  torsemide (DEMADEX) tablet 20 mg  20 mg Oral BID  potassium chloride (K-DUR, KLOR-CON) SR tablet 20 mEq  20 mEq Oral DAILY  spironolactone (ALDACTONE) tablet 50 mg  50 mg Oral BID  methadone (DOLOPHINE) tablet 30 mg  30 mg Oral BID  promethazine (PHENERGAN) with saline injection 12.5 mg  12.5 mg IntraVENous Q4H PRN  
 albuterol (PROVENTIL VENTOLIN) nebulizer solution 2.5 mg  2.5 mg Nebulization QID RT  
  ondansetron (ZOFRAN) injection 4 mg  4 mg IntraVENous Q4H PRN  pantoprazole (PROTONIX) tablet 40 mg  40 mg Oral ACB  metoprolol succinate (TOPROL-XL) XL tablet 25 mg  25 mg Oral BID  
 NUTRITIONAL SUPPORT ELECTROLYTE PRN ORDERS   Does Not Apply PRN  
 lisinopril (PRINIVIL, ZESTRIL) tablet 2.5 mg  2.5 mg Oral DAILY  enoxaparin (LOVENOX) injection 40 mg  40 mg SubCUTAneous Q24H  
 magnesium oxide (MAG-OX) tablet 400 mg  400 mg Oral TID  ferrous sulfate tablet 325 mg  1 Tab Oral TID WITH MEALS  senna-docusate (PERICOLACE) 8.6-50 mg per tablet 2 Tab  2 Tab Oral QHS  polyethylene glycol (MIRALAX) packet 17 g  17 g Oral DAILY  magnesium hydroxide (MILK OF MAGNESIA) 400 mg/5 mL oral suspension 30 mL  30 mL Oral DAILY PRN  
 sodium chloride (NS) flush 10 mL  10 mL InterCATHeter Q8H  
 sodium chloride (NS) flush 10 mL  10 mL InterCATHeter PRN  
 sodium chloride (NS) flush 5-10 mL  5-10 mL IntraVENous Q8H  
 sodium chloride (NS) flush 5-10 mL  5-10 mL IntraVENous PRN  
 acetaminophen (TYLENOL) tablet 650 mg  650 mg Oral Q4H PRN  
 ceFAZolin (ANCEF) 2 g/20 mL in sterile water IV syringe  2 g IntraVENous Q8H Review of Systems Constitutional: negative for fever, chills, sweats Cardiovascular: negative for chest pain, palpitations, syncope, edema Gastrointestinal:  negative for dysphagia, reflux, vomiting, diarrhea, abdominal pain, or melena Neurologic:  negative for focal weakness, numbness, headache Objective:  
 
Vitals:  
 10/22/18 2218 10/22/18 2356 10/23/18 0350 10/23/18 8429 BP: 115/71 100/63 92/59 Pulse: (!) 108 96 89 Resp:  20 18 Temp:  98.8 °F (37.1 °C) 98.3 °F (36.8 °C) SpO2:  98% 99% Weight:    189 lb 9.6 oz (86 kg) Height:      
 
Intake and Output:  
10/21 1901 - 10/23 0700 In: -  
Out: [place] No intake/output data recorded. Physical Exam:  
Constitution:  the patient is well developed and in no acute distress EENMT:  Sclera clear, pupils equal, oral mucosa moist 
Respiratory: basilar crackles Cardiovascular:  RRR without M,G,R 
Gastrointestinal: soft and non-tender; with positive bowel sounds. Musculoskeletal: warm without cyanosis. There is 2+ lower leg edema. Skin:  no jaundice or rashes, no wounds Neurologic: no gross neuro deficits Psychiatric:  alert and oriented x 3 CXR:  
 
 
LAB No results for input(s): GLUCPOC in the last 72 hours. No lab exists for component: [place] Recent Labs 10/23/18 
0096 10/22/18 
2293 10/21/18 
0602 WBC 9.9 9.3 9.5 HGB 8.4* 8.0* 8.1* HCT 30.4* 28.8* 28.3*  
 385 405 Recent Labs 10/23/18 
7172 10/22/18 
5363 10/21/18 
0602 * 131* 129*  
K 4.5 4.5 4.4  
CL 88* 88* 88* CO2 38* 37* 34* GLU 88 100 83 BUN 22 20 25* CREA 1.19* 1.16* 1.07* MG 1.9 2.0 1.9 CA 8.9 8.6 8.7 PHOS 3.1 2.4* 2.6 No results for input(s): PH, PCO2, PO2, HCO3 in the last 72 hours. No results for input(s): LCAD, LAC in the last 72 hours. Assessment:  (Medical Decision Making) Hospital Problems  Never Reviewed Codes Class Noted POA Hypoproteinemia ([facility] ICD-10-CM: E77.8 ICD-9-CM: 273.8  10/17/2018 Unknown Pleural effusion ICD-10-CM: J90 ICD-9-CM: 511.9  10/16/2018 Unknown Hypomagnesemia ICD-10-CM: W72.42 
ICD-9-CM: 275.2  10/16/2018 Unknown Hypokalemia ICD-10-CM: E87.6 ICD-9-CM: 276.8  10/16/2018 Unknown * (Principal) Sepsis ([facility] ICD-10-CM: A41.9 ICD-9-CM: 038.9, 995.91  10/14/2018 Unknown MSSA bacteremia ICD-10-CM: R78.81 ICD-9-CM: 790.7, 041.11  10/14/2018 Unknown Septic arthritis ([facility] ICD-10-CM: M00.9 ICD-9-CM: 711.00  10/14/2018 Unknown Prolonged Q-T interval on ECG ICD-10-CM: R94.31 
ICD-9-CM: 794.31  10/14/2018 Unknown Transaminitis ICD-10-CM: R74.0 ICD-9-CM: 790.4  10/14/2018 Unknown Hyponatremia ICD-10-CM: E87.1 ICD-9-CM: 276.1  10/14/2018 Unknown Systolic CHF, acute on chronic (HCC) ICD-10-CM: Z05.67 ICD-9-CM: 428.23, 428.0  8/30/2018 Yes Heroin abuse ([facility] ICD-10-CM: F11.10 ICD-9-CM: 305.50  4/10/2018 Yes Plan:  (Medical Decision Making) 1  cxr today 2  Check ra O2 sat 
3   Diuresis onging 
-- 
 
More than 50% of the time documented was spent in face-to-face contact with the patient and in the care of the patient on the floor/unit where the patient is located.  
 
Jef Vargas MD

## 2018-10-23 NOTE — PROGRESS NOTES
Visited with pt regarding plans for discharge, pt plans on staying until course on IV ABX is finished on 10/5. She does have to appear in person with her  in [place] on 10/25. Pt sattes that she called and they told her that she needed to come in person and fill out forms and they could not be faxed. I called and left a message with her probation officier, Mrs. Heather Vergara #[phone], to please call back regarding pt's appt.

## 2018-10-24 LAB
AMPHETAMINES UR QL SCN: NEGATIVE NG/ML
ANION GAP SERPL CALC-SCNC: 6 MMOL/L
BARBITURATES UR QL SCN: NEGATIVE NG/ML
BASOPHILS # BLD: 0.1 K/UL (ref 0–0.2)
BASOPHILS NFR BLD: 1 % (ref 0–2)
BENZODIAZ UR QL: NEGATIVE NG/ML
BUN SERPL-MCNC: 25 MG/DL (ref 6–23)
BZE UR QL: NEGATIVE NG/ML
CALCIUM SERPL-MCNC: 9 MG/DL (ref 8.3–10.4)
CANNABINOIDS UR QL SCN: NEGATIVE NG/ML
CHLORIDE SERPL-SCNC: 86 MMOL/L (ref 98–107)
CO2 SERPL-SCNC: 38 MMOL/L (ref 21–32)
CREAT SERPL-MCNC: 1.34 MG/DL (ref 0.6–1)
DIFFERENTIAL METHOD BLD: ABNORMAL
EOSINOPHIL # BLD: 0.1 K/UL (ref 0–0.8)
EOSINOPHIL NFR BLD: 1 % (ref 0.5–7.8)
ERYTHROCYTE [DISTWIDTH] IN BLOOD BY AUTOMATED COUNT: 21.6 %
GLUCOSE SERPL-MCNC: 87 MG/DL (ref 65–100)
HCT VFR BLD AUTO: 28.3 % (ref 35.8–46.3)
HGB BLD-MCNC: 8 G/DL (ref 11.7–15.4)
IMM GRANULOCYTES # BLD: 0.1 K/UL (ref 0–0.5)
IMM GRANULOCYTES NFR BLD AUTO: 1 % (ref 0–5)
LYMPHOCYTES # BLD: 2 K/UL (ref 0.5–4.6)
LYMPHOCYTES NFR BLD: 24 % (ref 13–44)
Lab: ABNORMAL NG/ML
Lab: POSITIVE
MAGNESIUM SERPL-MCNC: 2.2 MG/DL (ref 1.8–2.4)
MCH RBC QN AUTO: 25.8 PG (ref 26.1–32.9)
MCHC RBC AUTO-ENTMCNC: 28.3 G/DL (ref 31.4–35)
MCV RBC AUTO: 91.3 FL (ref 79.6–97.8)
METHADONE UR QL SCN: NORMAL NG/ML
MONOCYTES # BLD: 0.9 K/UL (ref 0.1–1.3)
MONOCYTES NFR BLD: 10 % (ref 4–12)
NEUTS SEG # BLD: 5.5 K/UL (ref 1.7–8.2)
NEUTS SEG NFR BLD: 64 % (ref 43–78)
NRBC # BLD: 0.05 K/UL (ref 0–0.2)
OPIATES, 714820: NEGATIVE NG/ML
PCP UR QL: NEGATIVE NG/ML
PHOSPHATE SERPL-MCNC: 3.5 MG/DL (ref 2.5–4.5)
PLATELET # BLD AUTO: 407 K/UL (ref 150–450)
PMV BLD AUTO: 9.2 FL (ref 9.4–12.3)
POTASSIUM SERPL-SCNC: 4.4 MMOL/L (ref 3.5–5.1)
PROPOXYPH UR QL: NEGATIVE NG/ML
RBC # BLD AUTO: 3.1 M/UL (ref 4.05–5.2)
SODIUM SERPL-SCNC: 130 MMOL/L (ref 136–145)
WBC # BLD AUTO: 8.6 K/UL (ref 4.3–11.1)

## 2018-10-24 PROCEDURE — 74011250636 HC RX REV CODE- 250/636: Performed by: HOSPITALIST

## 2018-10-24 PROCEDURE — 83735 ASSAY OF MAGNESIUM: CPT

## 2018-10-24 PROCEDURE — 94760 N-INVAS EAR/PLS OXIMETRY 1: CPT

## 2018-10-24 PROCEDURE — 94640 AIRWAY INHALATION TREATMENT: CPT

## 2018-10-24 PROCEDURE — 74011250637 HC RX REV CODE- 250/637: Performed by: INTERNAL MEDICINE

## 2018-10-24 PROCEDURE — 74011250636 HC RX REV CODE- 250/636: Performed by: NURSE PRACTITIONER

## 2018-10-24 PROCEDURE — 84100 ASSAY OF PHOSPHORUS: CPT

## 2018-10-24 PROCEDURE — 99232 SBSQ HOSP IP/OBS MODERATE 35: CPT | Performed by: INTERNAL MEDICINE

## 2018-10-24 PROCEDURE — 80048 BASIC METABOLIC PNL TOTAL CA: CPT

## 2018-10-24 PROCEDURE — 36415 COLL VENOUS BLD VENIPUNCTURE: CPT

## 2018-10-24 PROCEDURE — 74011000250 HC RX REV CODE- 250: Performed by: INTERNAL MEDICINE

## 2018-10-24 PROCEDURE — 85025 COMPLETE CBC W/AUTO DIFF WBC: CPT

## 2018-10-24 PROCEDURE — 74011250637 HC RX REV CODE- 250/637: Performed by: HOSPITALIST

## 2018-10-24 PROCEDURE — 65270000029 HC RM PRIVATE

## 2018-10-24 PROCEDURE — 77010033678 HC OXYGEN DAILY

## 2018-10-24 PROCEDURE — 74011250636 HC RX REV CODE- 250/636: Performed by: INTERNAL MEDICINE

## 2018-10-24 RX ORDER — HEPARIN SODIUM 5000 [USP'U]/ML
5000 INJECTION, SOLUTION INTRAVENOUS; SUBCUTANEOUS EVERY 12 HOURS
Status: DISCONTINUED | OUTPATIENT
Start: 2018-10-24 | End: 2018-10-25 | Stop reason: HOSPADM

## 2018-10-24 RX ADMIN — ALBUTEROL SULFATE 2.5 MG: 2.5 SOLUTION RESPIRATORY (INHALATION) at 19:42

## 2018-10-24 RX ADMIN — METHADONE HYDROCHLORIDE 30 MG: 10 TABLET ORAL at 17:57

## 2018-10-24 RX ADMIN — METHADONE HYDROCHLORIDE 30 MG: 10 TABLET ORAL at 10:14

## 2018-10-24 RX ADMIN — TORSEMIDE 20 MG: 20 TABLET ORAL at 10:15

## 2018-10-24 RX ADMIN — Medication 400 MG: at 21:23

## 2018-10-24 RX ADMIN — Medication 400 MG: at 10:15

## 2018-10-24 RX ADMIN — FERROUS SULFATE TAB 325 MG (65 MG ELEMENTAL FE) 325 MG: 325 (65 FE) TAB at 21:23

## 2018-10-24 RX ADMIN — Medication 10 ML: at 10:24

## 2018-10-24 RX ADMIN — FERROUS SULFATE TAB 325 MG (65 MG ELEMENTAL FE) 325 MG: 325 (65 FE) TAB at 10:15

## 2018-10-24 RX ADMIN — ALBUTEROL SULFATE 2.5 MG: 2.5 SOLUTION RESPIRATORY (INHALATION) at 07:31

## 2018-10-24 RX ADMIN — SPIRONOLACTONE 50 MG: 25 TABLET ORAL at 10:15

## 2018-10-24 RX ADMIN — POTASSIUM CHLORIDE 20 MEQ: 20 TABLET, EXTENDED RELEASE ORAL at 10:15

## 2018-10-24 RX ADMIN — Medication 10 ML: at 21:24

## 2018-10-24 RX ADMIN — Medication 400 MG: at 15:52

## 2018-10-24 RX ADMIN — HEPARIN SODIUM 5000 UNITS: 5000 INJECTION INTRAVENOUS; SUBCUTANEOUS at 21:23

## 2018-10-24 RX ADMIN — Medication 2 G: at 23:59

## 2018-10-24 RX ADMIN — Medication 2 G: at 10:15

## 2018-10-24 RX ADMIN — ONDANSETRON HYDROCHLORIDE 4 MG: 2 INJECTION INTRAMUSCULAR; INTRAVENOUS at 15:48

## 2018-10-24 RX ADMIN — ALBUTEROL SULFATE 2.5 MG: 2.5 SOLUTION RESPIRATORY (INHALATION) at 15:05

## 2018-10-24 RX ADMIN — Medication 2 G: at 15:52

## 2018-10-24 RX ADMIN — METOPROLOL SUCCINATE 25 MG: 25 TABLET, EXTENDED RELEASE ORAL at 10:15

## 2018-10-24 RX ADMIN — SENNOSIDES AND DOCUSATE SODIUM 2 TABLET: 8.6; 5 TABLET ORAL at 21:23

## 2018-10-24 RX ADMIN — Medication 10 ML: at 15:53

## 2018-10-24 RX ADMIN — ONDANSETRON HYDROCHLORIDE 4 MG: 2 INJECTION INTRAMUSCULAR; INTRAVENOUS at 23:59

## 2018-10-24 RX ADMIN — METOPROLOL SUCCINATE 25 MG: 25 TABLET, EXTENDED RELEASE ORAL at 21:24

## 2018-10-24 RX ADMIN — FERROUS SULFATE TAB 325 MG (65 MG ELEMENTAL FE) 325 MG: 325 (65 FE) TAB at 15:52

## 2018-10-24 RX ADMIN — ALBUTEROL SULFATE 2.5 MG: 2.5 SOLUTION RESPIRATORY (INHALATION) at 12:00

## 2018-10-24 RX ADMIN — PANTOPRAZOLE SODIUM 40 MG: 40 TABLET, DELAYED RELEASE ORAL at 10:15

## 2018-10-24 NOTE — PROGRESS NOTES
Infectious Disease Progress Note Today's Date: 10/24/2018 Admit Date: 10/14/2018 Impression:  
 
Impression: · Pleural effusions R>L, s/p thoracentesis 10/16/18 with 1000 ml serosanguinous fluid removed, analysis pending - likely CHF (EF 10%) vs infection related · MSSA bacteremia and presumed R-sided endocarditis with septic pulmonary emboli, s/p incomplete treatment (left AMA after 2 1/2 weeks of treatment) · Repeat CXR with persistent and possibly complex effusion. Pulmonary recommend attempting to drain and IR have been consulted for this. WBC normal. No fever. · R sacroiliac septic arthritis · IVDU: heroin, also uses cocaine; on methadone Plan:  
· Continue cefazolin. Her initial EOT was 10/22/18, but she missed two full weeks of treatment, leaving [facility]  and being admitted here 10/14/18. She will need an additional two weeks, of treatment, with new EOT 18. Would probably also extend treatment further with oral antibiotics on discharge. All blood cultures done here are negative to date. · Mild increase in CRT today but other labs seem ok. Has been getting diuresis, 10L off so far per IM. Repeat BMP in AM.  
· Pt needs to see her  10/25; she is planning to move to [place] to be with her mom and get help with her addiction. Anti-infectives: · cefazolin Subjective:  
Sitting in chair; no complaints beyond nausea with IV antibiotic controlled with nausea meds. No diarrhea or vaginal itching. Events with positive UDS Review of Systems:  Pertinent items are noted in the History of Present Illness. Objective:  
 
Visit Vitals BP 96/63 (BP 1 Location: Left arm, BP Patient Position: Supine; At rest) Pulse 69 Temp 98 °F (36.7 °C) Resp 17 Ht 6' (1.829 m) Wt 83.6 kg (184 lb 4.9 oz) SpO2 99% Breastfeeding? No  
BMI 25.00 kg/m² Temp (24hrs), Av.7 °F (36.5 °C), Min:96.4 °F (35.8 °C), Max:98.3 °F (36.8 °C) Physical Exam: General:  Alert, cooperative, well noursished, well developed, appears stated age Eyes:  Sclera anicteric. Pupils equally round and reactive to light. Mouth/Throat: Mucous membranes normal, oral pharynx clear; no thrush Neck: Supple Lungs:   Clear to auscultation bilaterally, good effort CV:  Regular rate and rhythm,no murmur, click, rub or gallop Abdomen:   Soft, non-tender. bowel sounds normal. non-distended Extremities: No cyanosis, 2+ edema Skin: Skin color, texture, turgor normal. no acute rash or lesions Lymph nodes: Cervical and supraclavicular normal  
Musculoskeletal: No swelling or deformity Lines/Devices:  Intact, no erythema, drainage or tenderness Psych: Alert and oriented, normal mood affect given the setting Data Review: CBC:  
Recent Labs 10/24/18 
0601 10/23/18 
8808 10/22/18 
6445 WBC 8.6 9.9 9.3  
RBC 3.10* 3.31* 3.18* HGB 8.0* 8.4* 8.0*  
HCT 28.3* 30.4* 28.8*  439 385 GRANS 64 60 69 LYMPH 24 27 20 EOS 1 1 0* CMP:  
Recent Labs 10/24/18 
0601 10/23/18 
7467 10/22/18 
1699 GLU 87 88 100 * 131* 131* K 4.4 4.5 4.5  
CL 86* 88* 88* CO2 38* 38* 37* BUN 25* 22 20 CREA 1.34* 1.19* 1.16* CA 9.0 8.9 8.6 AGAP 6 5 6 Microbiology: No new findings. Imaging:  
Result Comparison XR CHEST PA LAT (Order [id number]) Newer Version Older Version Final result 10/23/2018 10:00 AM   
[place], Rad Results In   
    
This is the newest version No older versions exist  
Narrative PA LATERAL CHEST X-RAY HISTORY: Pleural effusion COMPARISON: October 18, 2018 FINDINGS: EKG leads are present. The cardiac silhouette appears enlarged. A  
moderate to large pleural effusion is present on the right side along with a  
probable small left pleural effusion. There is bilateral lower lobe atelectasis  
or consolidation. Impression IMPRESSION: Bilateral pleural effusions with lower lobe atelectasis or consolidation. These may be complex effusions. Signed By: Zach Adams MD   
 October 24, 2018

## 2018-10-24 NOTE — PROGRESS NOTES
Infectious Disease Progress Note Today's Date: 10/24/2018 Admit Date: 10/14/2018 Impression: · Pleural effusions R>L, s/p thoracentesis 10/16/18 with 1000 ml serosanguinous fluid removed, analysis pending - likely CHF (EF 10%) vs infection related · MSSA bacteremia and presumed R-sided endocarditis with septic pulmonary emboli, s/p incomplete treatment (left AMA after 2 1/2 weeks of treatment) · R sacroiliac septic arthritis · IVDU: heroin, also uses cocaine; on methadone Plan:  
· Continue cefazolin. Her initial EOT was 10/22/18, but she missed two full weeks of treatment, leaving [facility] 9/28 and being admitted here 10/14/18. She will need an additional two weeks, of treatment, with new EOT 11/5/18. Would probably also extend treatment further with oral antibiotics on discharge. All blood cultures done here are negative to date. · Pt needs to see her  10/25; she is planning to move to [place] to be with her mom and get help with her addiction. Anti-infectives: · cefazolin Subjective:  
Patient not seen today but chart reviewed.

## 2018-10-24 NOTE — PROGRESS NOTES
Vilinda Rinne Admission Date: 10/14/2018 Daily Progress Note: 10/24/2018 The patient's chart is reviewed and the patient is discussed with the staff. 
 
33 y.o.  female seen and evaluated at the request of Dr. Sebastian German. She was admitted 10/14 with   cc of SOB and LE edema. 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 Patient originally treated for bacteremia/endocarditis at [facility] with nafcillin ending treatment 10/22/18. Patient used IV heroin via her PICC line while at [facility] and left AMA once they discontinued her PICC line. Once discharged, patient had no follow up and has not been taking her CHF medications until last week.   
  
Over the past few weeks, she has had increased SOB and LE edema. Admits to using IV heroinPTA. No new skin lesions.  She has been in icu rxed for sepsis.  Cards and ID are to see the pt. And she is getting lasix.  And brianne ancef for mssa endocarditis Had thoracentesis for 1000 ml ss fluid on 10/16/18  
I/o 1550 out 10/18 Subjective:  
 
 
Currently on 3 L O2 Up in chair MAYES Current Facility-Administered Medications Medication Dose Route Frequency  torsemide (DEMADEX) tablet 20 mg  20 mg Oral BID  potassium chloride (K-DUR, KLOR-CON) SR tablet 20 mEq  20 mEq Oral DAILY  spironolactone (ALDACTONE) tablet 50 mg  50 mg Oral BID  methadone (DOLOPHINE) tablet 30 mg  30 mg Oral BID  promethazine (PHENERGAN) with saline injection 12.5 mg  12.5 mg IntraVENous Q4H PRN  
 albuterol (PROVENTIL VENTOLIN) nebulizer solution 2.5 mg  2.5 mg Nebulization QID RT  
 ondansetron (ZOFRAN) injection 4 mg  4 mg IntraVENous Q4H PRN  pantoprazole (PROTONIX) tablet 40 mg  40 mg Oral ACB  metoprolol succinate (TOPROL-XL) XL tablet 25 mg  25 mg Oral BID  
  NUTRITIONAL SUPPORT ELECTROLYTE PRN ORDERS   Does Not Apply PRN  
 lisinopril (PRINIVIL, ZESTRIL) tablet 2.5 mg  2.5 mg Oral DAILY  enoxaparin (LOVENOX) injection 40 mg  40 mg SubCUTAneous Q24H  
 magnesium oxide (MAG-OX) tablet 400 mg  400 mg Oral TID  ferrous sulfate tablet 325 mg  1 Tab Oral TID WITH MEALS  senna-docusate (PERICOLACE) 8.6-50 mg per tablet 2 Tab  2 Tab Oral QHS  polyethylene glycol (MIRALAX) packet 17 g  17 g Oral DAILY  magnesium hydroxide (MILK OF MAGNESIA) 400 mg/5 mL oral suspension 30 mL  30 mL Oral DAILY PRN  
 sodium chloride (NS) flush 10 mL  10 mL InterCATHeter Q8H  
 sodium chloride (NS) flush 10 mL  10 mL InterCATHeter PRN  
 sodium chloride (NS) flush 5-10 mL  5-10 mL IntraVENous Q8H  
 sodium chloride (NS) flush 5-10 mL  5-10 mL IntraVENous PRN  
 acetaminophen (TYLENOL) tablet 650 mg  650 mg Oral Q4H PRN  
 ceFAZolin (ANCEF) 2 g/20 mL in sterile water IV syringe  2 g IntraVENous Q8H Review of Systems Constitutional: negative for fever, chills, sweats Cardiovascular: negative for chest pain, palpitations, syncope, edema Gastrointestinal:  negative for dysphagia, reflux, vomiting, diarrhea, abdominal pain, or melena Neurologic:  negative for focal weakness, numbness, headache Objective:  
 
Vitals:  
 10/24/18 8661 10/24/18 7768 10/24/18 4346 10/24/18 5182 BP: 109/66   108/80 Pulse: 87 84  93 Resp: 18   19 Temp: 98.3 °F (36.8 °C)   96.4 °F (35.8 °C) SpO2: 98%  97% 93% Weight:      
Height:      
 
Intake and Output:  
10/22 1901 - 10/24 0700 In: -  
Out: 3350 [[id number]] No intake/output data recorded. Physical Exam:  
Constitution:  the patient is well developed and in no acute distress EENMT:  Sclera clear, pupils equal, oral mucosa moist 
Respiratory: basilar crackles Cardiovascular:  RRR without M,G,R 
Gastrointestinal: soft and non-tender; with positive bowel sounds. Musculoskeletal: warm without cyanosis. There is 2+ lower leg edema. Skin:  no jaundice or rashes, no wounds Neurologic: no gross neuro deficits Psychiatric:  alert and oriented x 3 CXR:  
 
 
 
 
LAB No results for input(s): GLUCPOC in the last 72 hours. No lab exists for component: [place] Recent Labs 10/24/18 
0601 10/23/18 
9392 10/22/18 
3092 WBC 8.6 9.9 9.3 HGB 8.0* 8.4* 8.0*  
HCT 28.3* 30.4* 28.8*  439 385 Recent Labs 10/24/18 
0601 10/23/18 
5836 10/22/18 
0966 * 131* 131* K 4.4 4.5 4.5  
CL 86* 88* 88* CO2 38* 38* 37* GLU 87 88 100 BUN 25* 22 20 CREA 1.34* 1.19* 1.16* MG 2.2 1.9 2.0  
CA 9.0 8.9 8.6 PHOS 3.5 3.1 2.4* No results for input(s): PH, PCO2, PO2, HCO3 in the last 72 hours. No results for input(s): LCAD, LAC in the last 72 hours. 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 
ICD-9-CM: 518.81  10/23/2018 Yes Hypoproteinemia ([facility] [address] ICD-10-CM: E77.8 ICD-9-CM: 273.8  10/17/2018 Unknown Pleural effusion ICD-10-CM: J90 ICD-9-CM: 511.9  10/16/2018 Unknown Overview Signed 10/24/2018  9:03 AM by Alisha Guaman MD  
  Loculated on the right Hypomagnesemia ICD-10-CM: O00.21 
ICD-9-CM: 275.2  10/16/2018 Unknown Hypokalemia ICD-10-CM: E87.6 ICD-9-CM: 276.8  10/16/2018 Unknown Sepsis ([facility] ICD-10-CM: A41.9 ICD-9-CM: 038.9, 995.91  10/14/2018 Unknown MSSA bacteremia ICD-10-CM: R78.81 ICD-9-CM: 790.7, 041.11  10/14/2018 Unknown Septic arthritis ([facility] ICD-10-CM: M00.9 ICD-9-CM: 711.00  10/14/2018 Unknown Prolonged Q-T interval on ECG ICD-10-CM: R94.31 
ICD-9-CM: 794.31  10/14/2018 Unknown Transaminitis ICD-10-CM: R74.0 ICD-9-CM: 790.4  10/14/2018 Unknown Hyponatremia ICD-10-CM: E87.1 ICD-9-CM: 276.1  10/14/2018 Unknown Systolic CHF, acute on chronic (HCC) ICD-10-CM: O37.75 ICD-9-CM: 428.23, 428.0  8/30/2018 Yes Heroin abuse ([facility] [address] ICD-10-CM: F11.10 ICD-9-CM: 305.50  4/10/2018 Yes Plan:  (Medical Decision Making) Need CT guided drainage of effusion in IR and possible TPA if needed Cont. ABX Wean O2 Discussed with Dr. Antonio Franco More than 50% of the time documented was spent in face-to-face contact with the patient and in the care of the patient on the floor/unit where the patient is located.  
 
Chava An MD

## 2018-10-24 NOTE — PROGRESS NOTES
Shift assessment completed via doc flow sheet. Pt is A/O x 4. No acute distress noted. Lungs with crackles. Pt is tachycardic. Pt on heart monitor and overnight oxymetry. On O2 at 2 L via nasal cannula. Pt denies pain and other needs at this time. Ambulates ad delores to the bathroom. Pt is noted with 4 + edema to BLE. Sitter at bedside. All safety measures in place. Pt is instructed to call for assistance. Call light is within reach.

## 2018-10-24 NOTE — PROGRESS NOTES
Problem: Interdisciplinary Rounds Goal: Interdisciplinary Rounds Interdisciplinary team rounds were held 10/24/2018 with the following team members:Care Management, Nursing, Nutrition, Pharmacy, Physical Therapy and Physician. Plan of care discussed. See clinical pathway and/or care plan for interventions and desired outcomes.

## 2018-10-24 NOTE — PROGRESS NOTES
AM assessment completed. PT laying in bed resting quietly. AAOx4 with 2L NC. Denies pain, bed is low and locked with call light in reach and sitter in the room. Will continue to monitor.

## 2018-10-24 NOTE — PROGRESS NOTES
Called and left message with Mrs. Mikki Tim (), for her to please call me back, this is a time sensitive matter regarding the pt's appointment on 10/25. Pt informed that I attempted to contact Mrs. Loja again with no response, and that I will try again later.

## 2018-10-24 NOTE — PROGRESS NOTES
Hospitalist Progress Note Patient: Matthew Schwartz MRN: [id number]  SSN: [id number] YOB: 1985  Age: 28 y.o. Sex: female Admit Date: 10/14/2018 LOS: 10 days Subjective:  
 
From previous notes: \"29 year old female with history of CHF EF 10% diagnosed 4/18, IVDU heroin, presented to the ED with worsening shortness of breath pedal edema, with on going heroin use. She had signed out of [facility]  9/28 Where she was being treated for MSSA bacteremia/endocarditis, (with suspicion of heroin use via her picc line) with end date at that time 10/14. Since then she was off her CHF medications. And had not received any antibiotics She presented with pain in hip, worsening shortness of breath, and decompensated CHF. She has been started on cefazolin here by ID with EOT 11/5. We have diuresed her 10L, and currently is on spironolactone and torsemide with on going good diuresis. \" 
 
10/23 - Patient is upset about her sitter. She states she has not been using drugs despite a positive UDS and admitted to using heroin to other physicians. Denies CP/SOB. Still with BLE edema. Denies F/C/N/V. 
10/24 - No new acute issues. She states that she was up most of the night due to machines \"beeping at me. \" Denies CP/SOB. Denies F/C/N/V. Review of systems negative except stated above. Objective:  
 
Visit Vitals /66 (BP 1 Location: Right arm) Pulse 84 Temp 98.3 °F (36.8 °C) Resp 18 Ht 6' (1.829 m) Wt 86 kg (189 lb 9.6 oz) SpO2 97% Comment: was 82% on RA Breastfeeding? No  
BMI 25.71 kg/m² Oxygen Therapy O2 Sat (%): 97 %(was 82% on RA) (10/24/18 0735) Pulse via Oximetry: 84 beats per minute (10/24/18 0735) O2 Device: Nasal mask (10/24/18 0735) O2 Flow Rate (L/min): 2 l/min (10/24/18 0735) FIO2 (%): 28 % (10/24/18 0735) Intake and Output:  
 
Intake/Output Summary (Last 24 hours) at 10/24/2018 0820 Last data filed at 10/24/2018 7432 Gross per 24 hour Intake  Output 2000 ml Net -2000 ml Physical Exam:  
GENERAL: alert, cooperative, no distress, appears stated age EYE: conjunctivae/corneas clear. PERRL. THROAT & NECK: normal and no erythema or exudates noted. LUNG: clear to auscultation bilaterally HEART: regular rate and rhythm, S1S2, no murmur, no JVD ABDOMEN: soft, non-tender, non-distended. Bowel sounds normal.  
EXTREMITIES:  2+ BLE edema, 2+ pedal/radial pulses bilaterally SKIN: no rash or abnormalities NEUROLOGIC: A&Ox3. Cranial nerves 2-12 grossly intact. Lab/Data Review: 
Recent Results (from the past 24 hour(s)) MAGNESIUM Collection Time: 10/24/18  6:01 AM  
Result Value Ref Range Magnesium 2.2 1.8 - 2.4 mg/dL PHOSPHORUS Collection Time: 10/24/18  6:01 AM  
Result Value Ref Range Phosphorus 3.5 2.5 - 4.5 MG/DL  
CBC WITH AUTOMATED DIFF Collection Time: 10/24/18  6:01 AM  
Result Value Ref Range WBC 8.6 4.3 - 11.1 K/uL  
 RBC 3.10 (L) 4.05 - 5.2 M/uL HGB 8.0 (L) 11.7 - 15.4 g/dL HCT 28.3 (L) 35.8 - 46.3 % MCV 91.3 79.6 - 97.8 FL  
 MCH 25.8 (L) 26.1 - 32.9 PG  
 MCHC 28.3 (L) 31.4 - 35.0 g/dL RDW 21.6 % PLATELET 335 928 - 376 K/uL MPV 9.2 (L) 9.4 - 12.3 FL ABSOLUTE NRBC 0.05 0.0 - 0.2 K/uL  
 DF AUTOMATED NEUTROPHILS 64 43 - 78 % LYMPHOCYTES 24 13 - 44 % MONOCYTES 10 4.0 - 12.0 % EOSINOPHILS 1 0.5 - 7.8 % BASOPHILS 1 0.0 - 2.0 % IMMATURE GRANULOCYTES 1 0.0 - 5.0 %  
 ABS. NEUTROPHILS 5.5 1.7 - 8.2 K/UL  
 ABS. LYMPHOCYTES 2.0 0.5 - 4.6 K/UL  
 ABS. MONOCYTES 0.9 0.1 - 1.3 K/UL  
 ABS. EOSINOPHILS 0.1 0.0 - 0.8 K/UL  
 ABS. BASOPHILS 0.1 0.0 - 0.2 K/UL  
 ABS. IMM. GRANS. 0.1 0.0 - 0.5 K/UL METABOLIC PANEL, BASIC Collection Time: 10/24/18  6:01 AM  
Result Value Ref Range Sodium 130 (L) 136 - 145 mmol/L Potassium 4.4 3.5 - 5.1 mmol/L Chloride 86 (L) 98 - 107 mmol/L  
 CO2 38 (H) 21 - 32 mmol/L Anion gap 6 mmol/L Glucose 87 65 - 100 mg/dL BUN 25 (H) 6 - 23 MG/DL Creatinine 1.34 (H) 0.6 - 1.0 MG/DL  
 GFR est AA 58 (L) >60 ml/min/1.73m2 GFR est non-AA 48 ml/min/1.73m2 Calcium 9.0 8.3 - 10.4 MG/DL Imaging: Xr Chest Sngl V Result Date: 10/18/2018 Impression:  No significant interval change. Xr Chest Pa Lat Result Date: 10/23/2018 IMPRESSION: Bilateral pleural effusions with lower lobe atelectasis or consolidation. These may be complex effusions. Xr Chest Pa Lat Result Date: 10/14/2018 IMPRESSION: Cardiomegaly and large pleural effusions, right more than left. Pleural effusions are new since 30 August 2018 Xr Chest [facility] Result Date: 10/16/2018 IMPRESSION: Suspected pleural effusions with bilateral lower lobe atelectasis or consolidation. No results found for this visit on 10/14/18. Cultures: All Micro Results Procedure Component Value Units Date/Time CULTURE, BLOOD [[id number]] Collected:  10/17/18 1321 Order Status:  Completed Specimen:  Blood Updated:  10/22/18 0815 Special Requests: --     
  RIGHT 
HAND Culture result: NO GROWTH 5 DAYS     
 CULTURE, BLOOD [[id number]] Collected:  10/17/18 1319 Order Status:  Completed Specimen:  Blood Updated:  10/22/18 0815 Special Requests: --     
  RIGHT 
HAND Culture result: NO GROWTH 5 DAYS     
 CULTURE, BLOOD [[id number]] Collected:  10/15/18 2233 Order Status:  Completed Specimen:  Blood Updated:  10/20/18 1150 Special Requests: RIGHT ANTECUBITAL Culture result: NO GROWTH 5 DAYS     
 CULTURE, BLOOD [[id number]] Collected:  10/15/18 2235 Order Status:  Completed Specimen:  Blood Updated:  10/20/18 1150 Special Requests: RIGHT ARM Culture result: NO GROWTH 5 DAYS FUNGUS CULTURE AND SMEAR [[id number]] Collected:  10/16/18 4786 Order Status:  Completed Specimen:  Other from Miscellaneous sample Updated:  10/19/18 1134 Source THORACENTESIS Comment: RIGHT Corrected on 10/16 AT 0951: This is a correction to the medical record of the test results previously reported as THORACENTESIS Fungus stain Direct Inoculation Fungus (Mycology) Culture Other source received Comment: (NOTE) Performed At: [address] [id number] Lonnie Johns MD RODRIGUEZ:[id number] CULTURE, BLOOD [[id number]] Collected:  10/14/18 1250 Order Status:  Completed Specimen:  Whole Blood Updated:  10/19/18 0809 Special Requests: --     
  RIGHT 
HAND Culture result: NO GROWTH 5 DAYS     
 CULTURE, BLOOD [[id number]] Collected:  10/14/18 1251 Order Status:  Completed Specimen:  Whole Blood Updated:  10/19/18 0809 Special Requests: --     
  HAND 
LEFT Culture result: NO GROWTH 5 DAYS     
 CULTURE, BODY FLUID [facility] [address] [place] [[id number]] Collected:  10/16/18 8797 Order Status:  Completed Specimen:  Thoracentesis Updated:  10/18/18 4751 Special Requests: NO SPECIAL REQUESTS     
  GRAM STAIN 0 TO 10 WBC'S/OIF  
   NO DEFINITE ORGANISM SEEN Culture result: NO GROWTH 2 DAYS     
 AFB CULTURE + SMEAR W/RFLX ID FROM CULTURE [[id number]] Collected:  10/16/18 0951 Order Status:  Completed Specimen:  Miscellaneous sample Updated:  10/17/18 1736 Source THORACENTESIS Comment: RIGHT Corrected on 10/16 AT 0951: This is a correction to the medical record of the test results previously reported as THORACENTESIS 
  
  
  AFB Specimen processing Concentration Acid Fast Smear NEGATIVE Comment: (NOTE) Performed At: [address] [id number] Lonnie Johns MD TO:[id number] Acid Fast Culture PENDING  
 CULTURE, BLOOD [[id number]] Collected:  10/14/18 1300 Order Status:  Canceled Specimen:  Whole Blood Assessment/Plan:  
 
Principal Problem: 
  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 
- Continue Cefazolin - EOT 11/5/18 Active Problems: MSSA bacteremia (10/14/2018) - Due to IVDU 
- Continue Cefazolin - EOT 11/5/18 Sepsis ([address] (10/14/2018) - Due to #1 
- Resolved with abx Septic arthritis ([address] (10/14/2018) - Improving 
- Continue Cefazolin Systolic CHF, acute on chronic ([address] (8/30/2018) - EF 10% 
- Continue Demadex 20mg BID 
- Continue Spironolactone 50mg BID 
- Continue Toprol 
- Continue Lisinopril Acute respiratory failure ([address] (10/23/2018) - Improving with duresis - Oxygen qualifier today showed she still needs oxygen - Pulmonary following Prolonged Q-T interval on ECG (10/14/2018) Hyponatremia (10/14/2018) - Resolved Pleural effusion (10/16/2018) - Persistent --> may be complex 
- Consult IR for assistance as it may be infectious - Pulmonary following Hypomagnesemia (10/16/2018) - Mag 2.2 
- Continue to follow Hypokalemia (10/16/2018) - Resolved Heroin abuse ([address] (4/10/2018) - Continue Methadone 
- CANNOT leave with PICC line Transaminitis (10/14/2018) - Resolved Hypoproteinemia ([address] (10/17/2018) - Due to drug use and lack of appetite Dispo - Continue Cefazolin EOT 11/5/18. Continue diuresis. Patient adamant she is leaving on 10/25 for a parole hearing. DIET NUTRITIONAL SUPPLEMENTS Dinner, Lunch; Ensure [employer] DIET CARDIAC Regular; FR 1800ML DVT Prophylaxis: Heparin Signed By: Yassine Del Castillo DO October 24, 2018

## 2018-10-24 NOTE — PROGRESS NOTES
Called Mrs. Loja again, receptionists stated that she was at [address] [facility] answer, left message. Called back and asked if there was anyone else who I could speak to about this pt's situation, she forwarded me onto the supervisor, who I also left a message with.

## 2018-10-24 NOTE — PROGRESS NOTES
10/23/18 2009 Oxygen Therapy O2 Flow Rate (L/min) 1 l/min Patient placed on overnight continuous sat monitor # 10  and  1 L NC

## 2018-10-25 LAB
AMPHET UR QL SCN: NEGATIVE
ANION GAP SERPL CALC-SCNC: 7 MMOL/L
BARBITURATES UR QL SCN: NEGATIVE
BASOPHILS # BLD: 0 K/UL (ref 0–0.2)
BASOPHILS NFR BLD: 1 % (ref 0–2)
BENZODIAZ UR QL: NEGATIVE
BUN SERPL-MCNC: 26 MG/DL (ref 6–23)
CALCIUM SERPL-MCNC: 9 MG/DL (ref 8.3–10.4)
CANNABINOIDS UR QL SCN: NEGATIVE
CHLORIDE SERPL-SCNC: 87 MMOL/L (ref 98–107)
CO2 SERPL-SCNC: 38 MMOL/L (ref 21–32)
COCAINE UR QL SCN: NEGATIVE
CREAT SERPL-MCNC: 1.32 MG/DL (ref 0.6–1)
DIFFERENTIAL METHOD BLD: ABNORMAL
EOSINOPHIL # BLD: 0 K/UL (ref 0–0.8)
EOSINOPHIL NFR BLD: 1 % (ref 0.5–7.8)
ERYTHROCYTE [DISTWIDTH] IN BLOOD BY AUTOMATED COUNT: 22.1 %
GLUCOSE SERPL-MCNC: 89 MG/DL (ref 65–100)
HCT VFR BLD AUTO: 30.2 % (ref 35.8–46.3)
HGB BLD-MCNC: 8.5 G/DL (ref 11.7–15.4)
IMM GRANULOCYTES # BLD: 0.1 K/UL (ref 0–0.5)
IMM GRANULOCYTES NFR BLD AUTO: 1 % (ref 0–5)
LYMPHOCYTES # BLD: 2.4 K/UL (ref 0.5–4.6)
LYMPHOCYTES NFR BLD: 29 % (ref 13–44)
MAGNESIUM SERPL-MCNC: 2.3 MG/DL (ref 1.8–2.4)
MCH RBC QN AUTO: 25.8 PG (ref 26.1–32.9)
MCHC RBC AUTO-ENTMCNC: 28.1 G/DL (ref 31.4–35)
MCV RBC AUTO: 91.8 FL (ref 79.6–97.8)
METHADONE UR QL: POSITIVE
MONOCYTES # BLD: 0.9 K/UL (ref 0.1–1.3)
MONOCYTES NFR BLD: 11 % (ref 4–12)
NEUTS SEG # BLD: 5 K/UL (ref 1.7–8.2)
NEUTS SEG NFR BLD: 59 % (ref 43–78)
NRBC # BLD: 0.06 K/UL (ref 0–0.2)
OPIATES UR QL: NEGATIVE
PCP UR QL: NEGATIVE
PHOSPHATE SERPL-MCNC: 3.5 MG/DL (ref 2.5–4.5)
PLATELET # BLD AUTO: 416 K/UL (ref 150–450)
PMV BLD AUTO: 9.3 FL (ref 9.4–12.3)
POTASSIUM SERPL-SCNC: 4.5 MMOL/L (ref 3.5–5.1)
RBC # BLD AUTO: 3.29 M/UL (ref 4.05–5.2)
SODIUM SERPL-SCNC: 132 MMOL/L (ref 136–145)
WBC # BLD AUTO: 8.4 K/UL (ref 4.3–11.1)

## 2018-10-25 PROCEDURE — 80048 BASIC METABOLIC PNL TOTAL CA: CPT

## 2018-10-25 PROCEDURE — 77010033678 HC OXYGEN DAILY

## 2018-10-25 PROCEDURE — 74011250637 HC RX REV CODE- 250/637: Performed by: HOSPITALIST

## 2018-10-25 PROCEDURE — 83735 ASSAY OF MAGNESIUM: CPT

## 2018-10-25 PROCEDURE — 65270000029 HC RM PRIVATE

## 2018-10-25 PROCEDURE — 05HY33Z INSERTION OF INFUSION DEVICE INTO UPPER VEIN, PERCUTANEOUS APPROACH: ICD-10-PCS | Performed by: FAMILY MEDICINE

## 2018-10-25 PROCEDURE — 74011250636 HC RX REV CODE- 250/636: Performed by: NURSE PRACTITIONER

## 2018-10-25 PROCEDURE — 80307 DRUG TEST PRSMV CHEM ANLYZR: CPT

## 2018-10-25 PROCEDURE — 74011000250 HC RX REV CODE- 250: Performed by: INTERNAL MEDICINE

## 2018-10-25 PROCEDURE — 74011250636 HC RX REV CODE- 250/636: Performed by: HOSPITALIST

## 2018-10-25 PROCEDURE — 94760 N-INVAS EAR/PLS OXIMETRY 1: CPT

## 2018-10-25 PROCEDURE — 74011250637 HC RX REV CODE- 250/637: Performed by: INTERNAL MEDICINE

## 2018-10-25 PROCEDURE — 36415 COLL VENOUS BLD VENIPUNCTURE: CPT

## 2018-10-25 PROCEDURE — 84100 ASSAY OF PHOSPHORUS: CPT

## 2018-10-25 PROCEDURE — 76937 US GUIDE VASCULAR ACCESS: CPT

## 2018-10-25 PROCEDURE — 99232 SBSQ HOSP IP/OBS MODERATE 35: CPT | Performed by: INTERNAL MEDICINE

## 2018-10-25 PROCEDURE — 85025 COMPLETE CBC W/AUTO DIFF WBC: CPT

## 2018-10-25 PROCEDURE — 74011250636 HC RX REV CODE- 250/636: Performed by: INTERNAL MEDICINE

## 2018-10-25 PROCEDURE — C1751 CATH, INF, PER/CENT/MIDLINE: HCPCS

## 2018-10-25 PROCEDURE — 94640 AIRWAY INHALATION TREATMENT: CPT

## 2018-10-25 RX ORDER — ALBUTEROL SULFATE 0.83 MG/ML
SOLUTION RESPIRATORY (INHALATION)
Status: ACTIVE
Start: 2018-10-25 | End: 2018-10-26

## 2018-10-25 RX ORDER — CEFAZOLIN SODIUM/WATER 2 G/20 ML
2 SYRINGE (ML) INTRAVENOUS EVERY 8 HOURS
Status: DISCONTINUED | OUTPATIENT
Start: 2018-10-25 | End: 2018-10-26 | Stop reason: HOSPADM

## 2018-10-25 RX ORDER — ADHESIVE BANDAGE
30 BANDAGE TOPICAL DAILY PRN
Status: DISCONTINUED | OUTPATIENT
Start: 2018-10-25 | End: 2018-10-26 | Stop reason: HOSPADM

## 2018-10-25 RX ORDER — LANOLIN ALCOHOL/MO/W.PET/CERES
400 CREAM (GRAM) TOPICAL 3 TIMES DAILY
Status: DISCONTINUED | OUTPATIENT
Start: 2018-10-25 | End: 2018-10-26 | Stop reason: HOSPADM

## 2018-10-25 RX ORDER — ALBUTEROL SULFATE 0.83 MG/ML
2.5 SOLUTION RESPIRATORY (INHALATION)
Status: DISCONTINUED | OUTPATIENT
Start: 2018-10-25 | End: 2018-10-26 | Stop reason: HOSPADM

## 2018-10-25 RX ORDER — POLYETHYLENE GLYCOL 3350 17 G/17G
17 POWDER, FOR SOLUTION ORAL DAILY
Status: DISCONTINUED | OUTPATIENT
Start: 2018-10-26 | End: 2018-10-26 | Stop reason: HOSPADM

## 2018-10-25 RX ORDER — HEPARIN SODIUM 5000 [USP'U]/ML
5000 INJECTION, SOLUTION INTRAVENOUS; SUBCUTANEOUS EVERY 12 HOURS
Status: DISCONTINUED | OUTPATIENT
Start: 2018-10-25 | End: 2018-10-26 | Stop reason: HOSPADM

## 2018-10-25 RX ORDER — LISINOPRIL 5 MG/1
2.5 TABLET ORAL DAILY
Status: DISCONTINUED | OUTPATIENT
Start: 2018-10-26 | End: 2018-10-26 | Stop reason: HOSPADM

## 2018-10-25 RX ORDER — PANTOPRAZOLE SODIUM 40 MG/1
40 TABLET, DELAYED RELEASE ORAL
Status: DISCONTINUED | OUTPATIENT
Start: 2018-10-26 | End: 2018-10-26 | Stop reason: HOSPADM

## 2018-10-25 RX ORDER — SODIUM CHLORIDE 0.9 % (FLUSH) 0.9 %
5-10 SYRINGE (ML) INJECTION EVERY 8 HOURS
Status: DISCONTINUED | OUTPATIENT
Start: 2018-10-25 | End: 2018-10-26 | Stop reason: HOSPADM

## 2018-10-25 RX ORDER — AMOXICILLIN 250 MG
2 CAPSULE ORAL
Status: DISCONTINUED | OUTPATIENT
Start: 2018-10-25 | End: 2018-10-26 | Stop reason: HOSPADM

## 2018-10-25 RX ORDER — TORSEMIDE 20 MG/1
20 TABLET ORAL 2 TIMES DAILY
Status: DISCONTINUED | OUTPATIENT
Start: 2018-10-25 | End: 2018-10-26 | Stop reason: HOSPADM

## 2018-10-25 RX ORDER — LANOLIN ALCOHOL/MO/W.PET/CERES
1 CREAM (GRAM) TOPICAL
Status: DISCONTINUED | OUTPATIENT
Start: 2018-10-25 | End: 2018-10-26 | Stop reason: HOSPADM

## 2018-10-25 RX ORDER — POTASSIUM CHLORIDE 20 MEQ/1
20 TABLET, EXTENDED RELEASE ORAL DAILY
Status: DISCONTINUED | OUTPATIENT
Start: 2018-10-26 | End: 2018-10-26 | Stop reason: HOSPADM

## 2018-10-25 RX ORDER — METHADONE HYDROCHLORIDE 10 MG/1
30 TABLET ORAL 2 TIMES DAILY
Status: DISCONTINUED | OUTPATIENT
Start: 2018-10-25 | End: 2018-10-26 | Stop reason: HOSPADM

## 2018-10-25 RX ORDER — SPIRONOLACTONE 25 MG/1
50 TABLET ORAL 2 TIMES DAILY
Status: DISCONTINUED | OUTPATIENT
Start: 2018-10-25 | End: 2018-10-26 | Stop reason: HOSPADM

## 2018-10-25 RX ORDER — SODIUM CHLORIDE 0.9 % (FLUSH) 0.9 %
10 SYRINGE (ML) INJECTION AS NEEDED
Status: DISCONTINUED | OUTPATIENT
Start: 2018-10-25 | End: 2018-10-26 | Stop reason: HOSPADM

## 2018-10-25 RX ORDER — METOPROLOL SUCCINATE 25 MG/1
25 TABLET, EXTENDED RELEASE ORAL 2 TIMES DAILY
Status: DISCONTINUED | OUTPATIENT
Start: 2018-10-25 | End: 2018-10-26 | Stop reason: HOSPADM

## 2018-10-25 RX ORDER — ACETAMINOPHEN 325 MG/1
650 TABLET ORAL
Status: DISCONTINUED | OUTPATIENT
Start: 2018-10-25 | End: 2018-10-26 | Stop reason: HOSPADM

## 2018-10-25 RX ORDER — SODIUM CHLORIDE 0.9 % (FLUSH) 0.9 %
5-10 SYRINGE (ML) INJECTION AS NEEDED
Status: DISCONTINUED | OUTPATIENT
Start: 2018-10-25 | End: 2018-10-26 | Stop reason: HOSPADM

## 2018-10-25 RX ORDER — SODIUM CHLORIDE 0.9 % (FLUSH) 0.9 %
10 SYRINGE (ML) INJECTION EVERY 8 HOURS
Status: DISCONTINUED | OUTPATIENT
Start: 2018-10-25 | End: 2018-10-26 | Stop reason: HOSPADM

## 2018-10-25 RX ADMIN — HEPARIN SODIUM 5000 UNITS: 5000 INJECTION INTRAVENOUS; SUBCUTANEOUS at 14:32

## 2018-10-25 RX ADMIN — Medication 2 G: at 17:07

## 2018-10-25 RX ADMIN — Medication 10 ML: at 21:29

## 2018-10-25 RX ADMIN — PANTOPRAZOLE SODIUM 40 MG: 40 TABLET, DELAYED RELEASE ORAL at 08:24

## 2018-10-25 RX ADMIN — ALBUTEROL SULFATE 2.5 MG: 2.5 SOLUTION RESPIRATORY (INHALATION) at 13:06

## 2018-10-25 RX ADMIN — ALBUTEROL SULFATE 2.5 MG: 2.5 SOLUTION RESPIRATORY (INHALATION) at 19:43

## 2018-10-25 RX ADMIN — METOPROLOL SUCCINATE 25 MG: 25 TABLET, EXTENDED RELEASE ORAL at 17:07

## 2018-10-25 RX ADMIN — Medication 400 MG: at 08:24

## 2018-10-25 RX ADMIN — POTASSIUM CHLORIDE 20 MEQ: 20 TABLET, EXTENDED RELEASE ORAL at 08:22

## 2018-10-25 RX ADMIN — TORSEMIDE 20 MG: 20 TABLET ORAL at 17:07

## 2018-10-25 RX ADMIN — SPIRONOLACTONE 50 MG: 25 TABLET ORAL at 17:07

## 2018-10-25 RX ADMIN — Medication 400 MG: at 17:07

## 2018-10-25 RX ADMIN — FERROUS SULFATE TAB 325 MG (65 MG ELEMENTAL FE) 325 MG: 325 (65 FE) TAB at 17:07

## 2018-10-25 RX ADMIN — METHADONE HYDROCHLORIDE 30 MG: 10 TABLET ORAL at 14:32

## 2018-10-25 RX ADMIN — FERROUS SULFATE TAB 325 MG (65 MG ELEMENTAL FE) 325 MG: 325 (65 FE) TAB at 14:32

## 2018-10-25 RX ADMIN — SODIUM CHLORIDE 12.5 MG: 9 INJECTION INTRAMUSCULAR; INTRAVENOUS; SUBCUTANEOUS at 17:06

## 2018-10-25 RX ADMIN — Medication 10 ML: at 17:08

## 2018-10-25 RX ADMIN — SPIRONOLACTONE 50 MG: 25 TABLET ORAL at 08:22

## 2018-10-25 RX ADMIN — METOPROLOL SUCCINATE 25 MG: 25 TABLET, EXTENDED RELEASE ORAL at 08:24

## 2018-10-25 RX ADMIN — SENNOSIDES AND DOCUSATE SODIUM 2 TABLET: 8.6; 5 TABLET ORAL at 21:27

## 2018-10-25 RX ADMIN — Medication 400 MG: at 21:27

## 2018-10-25 RX ADMIN — TORSEMIDE 20 MG: 20 TABLET ORAL at 08:23

## 2018-10-25 RX ADMIN — ALBUTEROL SULFATE 2.5 MG: 2.5 SOLUTION RESPIRATORY (INHALATION) at 16:24

## 2018-10-25 RX ADMIN — ONDANSETRON HYDROCHLORIDE 4 MG: 2 INJECTION INTRAMUSCULAR; INTRAVENOUS at 08:15

## 2018-10-25 RX ADMIN — Medication 2 G: at 08:15

## 2018-10-25 RX ADMIN — FERROUS SULFATE TAB 325 MG (65 MG ELEMENTAL FE) 325 MG: 325 (65 FE) TAB at 08:23

## 2018-10-25 RX ADMIN — ALBUTEROL SULFATE 2.5 MG: 2.5 SOLUTION RESPIRATORY (INHALATION) at 08:06

## 2018-10-25 RX ADMIN — Medication 10 ML: at 05:44

## 2018-10-25 RX ADMIN — LISINOPRIL 2.5 MG: 5 TABLET ORAL at 08:22

## 2018-10-25 NOTE — PROGRESS NOTES
PROGRESS NOTE The patient is leaving AMA due to a parole meeting. I explained the risks of leaving such as death, respiratory failure, sepsis, etc due to her endocarditis and pleural effusions with hypoxic respiratory failure (she is on [facility]). She stated that she understood the risks, but she could not miss the meeting so she was still going to leave AMA. She signed the papers and left. She did say she would return to the ER after her parole hearing.  
 
Loraine Sanderson, DO

## 2018-10-25 NOTE — PROGRESS NOTES
MIDLINE Placement Note PRE-PROCEDURE VERIFICATION 
PROCEDURE DETAIL Time out completed with sandra botello rn and everyone in agreement with procedure. A single lumen Midline was started for vascular access. The following documentation is in addition to the Midline properties in the lines/airways flowsheet : 
Lot #: [id number] Xylocaine used: y Mid-Arm Circumference: 29 (cm) Internal Catheter Length: 8 (cm) Internal Catheter Total Length: 8 (cm) Vein Selection for Midline:right brachial 
 
 
 
 
Line is okay to use:

## 2018-10-25 NOTE — PROGRESS NOTES
Hospitalist Progress Note Patient: Sarahi Ovalle MRN: [id number]  SSN: [id number] YOB: 1985  Age: 28 y.o. Sex: female Admit Date: 10/14/2018 LOS: 11 days Subjective:  
 
From previous notes: \"29 year old female with history of CHF EF 10% diagnosed 4/18, IVDU heroin, presented to the ED with worsening shortness of breath pedal edema, with on going heroin use. She had signed out of [facility]  9/28 Where she was being treated for MSSA bacteremia/endocarditis, (with suspicion of heroin use via her picc line) with end date at that time 10/14. Since then she was off her CHF medications. And had not received any antibiotics She presented with pain in hip, worsening shortness of breath, and decompensated CHF. She has been started on cefazolin here by ID with EOT 11/5. We have diuresed her 10L, and currently is on spironolactone and torsemide with on going good diuresis. \" 
 
10/24 - No new acute issues. She states that she was up most of the night due to machines \"beeping at me. \" Denies CP/SOB. Denies F/C/N/V. 
10/25 - The patient feels good after returning. She denies CP/SOB. Denies F/C/N/V. Review of systems negative except stated above. Objective:  
 
Visit Vitals /68 (BP 1 Location: Right arm, BP Patient Position: At rest;Head of bed elevated (Comment degrees)) Pulse 94 Temp 97 °F (36.1 °C) Resp 18 Ht 6' (1.829 m) Wt 83.6 kg (184 lb 4.9 oz) SpO2 96% Comment: ON 1L N/C Breastfeeding? No  
BMI 25.00 kg/m² Oxygen Therapy O2 Sat (%): 96 %(ON 1L N/C) (10/25/18 1248) Pulse via Oximetry: 114 beats per minute (10/25/18 0805) O2 Device: Nasal cannula (10/25/18 0805) O2 Flow Rate (L/min): 1 l/min (10/25/18 0805) FIO2 (%): 24 % (10/24/18 1201) Intake and Output:  
 
Intake/Output Summary (Last 24 hours) at 10/25/2018 1251 Last data filed at 10/25/2018 7720 Gross per 24 hour Intake 360 ml Output 1350 ml Net -990 ml Physical Exam: GENERAL: alert, cooperative, no distress, appears stated age EYE: conjunctivae/corneas clear. PERRL. THROAT & NECK: normal and no erythema or exudates noted. LUNG: clear to auscultation bilaterally HEART: regular rate and rhythm, S1S2, no murmur, no JVD ABDOMEN: soft, non-tender, non-distended. Bowel sounds normal.  
EXTREMITIES:  2+ BLE edema, 2+ pedal/radial pulses bilaterally SKIN: no rash or abnormalities NEUROLOGIC: A&Ox3. Cranial nerves 2-12 grossly intact. Lab/Data Review: 
Recent Results (from the past 24 hour(s)) MAGNESIUM Collection Time: 10/25/18  5:30 AM  
Result Value Ref Range Magnesium 2.3 1.8 - 2.4 mg/dL PHOSPHORUS Collection Time: 10/25/18  5:30 AM  
Result Value Ref Range Phosphorus 3.5 2.5 - 4.5 MG/DL  
CBC WITH AUTOMATED DIFF Collection Time: 10/25/18  5:30 AM  
Result Value Ref Range WBC 8.4 4.3 - 11.1 K/uL  
 RBC 3.29 (L) 4.05 - 5.2 M/uL HGB 8.5 (L) 11.7 - 15.4 g/dL HCT 30.2 (L) 35.8 - 46.3 % MCV 91.8 79.6 - 97.8 FL  
 MCH 25.8 (L) 26.1 - 32.9 PG  
 MCHC 28.1 (L) 31.4 - 35.0 g/dL RDW 22.1 % PLATELET 122 691 - 104 K/uL MPV 9.3 (L) 9.4 - 12.3 FL ABSOLUTE NRBC 0.06 0.0 - 0.2 K/uL  
 DF AUTOMATED NEUTROPHILS 59 43 - 78 % LYMPHOCYTES 29 13 - 44 % MONOCYTES 11 4.0 - 12.0 % EOSINOPHILS 1 0.5 - 7.8 % BASOPHILS 1 0.0 - 2.0 % IMMATURE GRANULOCYTES 1 0.0 - 5.0 %  
 ABS. NEUTROPHILS 5.0 1.7 - 8.2 K/UL  
 ABS. LYMPHOCYTES 2.4 0.5 - 4.6 K/UL  
 ABS. MONOCYTES 0.9 0.1 - 1.3 K/UL  
 ABS. EOSINOPHILS 0.0 0.0 - 0.8 K/UL  
 ABS. BASOPHILS 0.0 0.0 - 0.2 K/UL  
 ABS. IMM. GRANS. 0.1 0.0 - 0.5 K/UL METABOLIC PANEL, BASIC Collection Time: 10/25/18  5:30 AM  
Result Value Ref Range Sodium 132 (L) 136 - 145 mmol/L Potassium 4.5 3.5 - 5.1 mmol/L Chloride 87 (L) 98 - 107 mmol/L  
 CO2 38 (H) 21 - 32 mmol/L Anion gap 7 mmol/L Glucose 89 65 - 100 mg/dL BUN 26 (H) 6 - 23 MG/DL  Creatinine 1.32 (H) 0.6 - 1.0 MG/DL  
 GFR est AA 59 (L) >60 ml/min/1.73m2 GFR est non-AA 49 ml/min/1.73m2 Calcium 9.0 8.3 - 10.4 MG/DL Imaging: Xr Chest Sngl V Result Date: 10/18/2018 Impression:  No significant interval change. Xr Chest Pa Lat Result Date: 10/23/2018 IMPRESSION: Bilateral pleural effusions with lower lobe atelectasis or consolidation. These may be complex effusions. Xr Chest Pa Lat Result Date: 10/14/2018 IMPRESSION: Cardiomegaly and large pleural effusions, right more than left. Pleural effusions are new since 30 August 2018 Xr Chest [facility] Result Date: 10/16/2018 IMPRESSION: Suspected pleural effusions with bilateral lower lobe atelectasis or consolidation. No results found for this visit on 10/14/18. Cultures: All Micro Results Procedure Component Value Units Date/Time CULTURE, BLOOD [[id number]] Collected:  10/17/18 1321 Order Status:  Completed Specimen:  Blood Updated:  10/22/18 0815 Special Requests: --     
  RIGHT 
HAND Culture result: NO GROWTH 5 DAYS     
 CULTURE, BLOOD [[id number]] Collected:  10/17/18 1319 Order Status:  Completed Specimen:  Blood Updated:  10/22/18 0815 Special Requests: --     
  RIGHT 
HAND Culture result: NO GROWTH 5 DAYS     
 CULTURE, BLOOD [[id number]] Collected:  10/15/18 2233 Order Status:  Completed Specimen:  Blood Updated:  10/20/18 1150 Special Requests: RIGHT ANTECUBITAL Culture result: NO GROWTH 5 DAYS     
 CULTURE, BLOOD [[id number]] Collected:  10/15/18 2235 Order Status:  Completed Specimen:  Blood Updated:  10/20/18 1150 Special Requests: RIGHT ARM Culture result: NO GROWTH 5 DAYS FUNGUS CULTURE AND SMEAR [[id number]] Collected:  10/16/18 5296 Order Status:  Completed Specimen:  Other from Miscellaneous sample Updated:  10/19/18 1138 Source THORACENTESIS Comment: RIGHT Corrected on 10/16 AT 0951:  This is a correction to the medical record of the test results previously reported as THORACENTESIS Fungus stain Direct Inoculation Fungus (Mycology) Culture Other source received Comment: (NOTE) Performed At: [address] [id number] Ck Milan MD [id number] CULTURE, BLOOD [[id number]] Collected:  10/14/18 1250 Order Status:  Completed Specimen:  Whole Blood Updated:  10/19/18 0809 Special Requests: --     
  RIGHT 
HAND Culture result: NO GROWTH 5 DAYS     
 CULTURE, BLOOD [[id number]] Collected:  10/14/18 1251 Order Status:  Completed Specimen:  Whole Blood Updated:  10/19/18 0809 Special Requests: --     
  HAND 
LEFT Culture result: NO GROWTH 5 DAYS     
 CULTURE, BODY FLUID W Pasjuan Daub [[id number]] Collected:  10/16/18 3393 Order Status:  Completed Specimen:  Thoracentesis Updated:  10/18/18 5686 Special Requests: NO SPECIAL REQUESTS     
  GRAM STAIN 0 TO 10 WBC'S/OIF  
   NO DEFINITE ORGANISM SEEN Culture result: NO GROWTH 2 DAYS     
 AFB CULTURE + SMEAR W/RFLX ID FROM CULTURE [[id number]] Collected:  10/16/18 0951 Order Status:  Completed Specimen:  Miscellaneous sample Updated:  10/17/18 1736 Source THORACENTESIS Comment: RIGHT Corrected on 10/16 AT 0951: This is a correction to the medical record of the test results previously reported as THORACENTESIS 
  
  
  AFB Specimen processing Concentration Acid Fast Smear NEGATIVE Comment: (NOTE) Performed At: [address] [id number] Ck Milan MD [id number] Acid Fast Culture PENDING  
 CULTURE, BLOOD [[id number]] Collected:  10/14/18 1300 Order Status:  Canceled Specimen:  Whole Blood Assessment/Plan:  
 
Principal Problem: 
  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 
- Continue Cefazolin - EOT 11/5/18 Active Problems: MSSA bacteremia (10/14/2018) - Due to IVDU 
- Continue Cefazolin - EOT 11/5/18 Sepsis ([address] (10/14/2018) - Due to #1 
- Resolved with abx Septic arthritis ([address] (10/14/2018) - Improving 
- Continue Cefazolin Systolic CHF, acute on chronic ([address] (8/30/2018) - EF 10% 
- Continue Demadex 20mg BID 
- Continue Spironolactone 50mg BID 
- Continue Toprol BID 
- Continue Lisinopril Acute respiratory failure ([address] (10/23/2018) - Improving with duresis - Continue oxygen - Pulmonary following Prolonged Q-T interval on ECG (10/14/2018) Hyponatremia (10/14/2018) - Resolved Pleural effusion (10/16/2018) - Persistent --> may be complex - To get repeat U/S with possible thoracentesis - Pulmonary following Hypomagnesemia (10/16/2018) - Mag 2.2 
- Continue to follow Hypokalemia (10/16/2018) - Resolved Heroin abuse ([address] (4/10/2018) - Continue Methadone 
- CANNOT leave with PICC line Transaminitis (10/14/2018) - Resolved Hypoproteinemia ([address] (10/17/2018) - Due to drug use and lack of appetite Dispo - Continue Cefazolin EOT 11/5/18. Continue diuresis. Patient returned from parole hearing --> will continue Ancef. DIET REGULAR 2 GM NA (House Low NA) DVT Prophylaxis: Heparin Signed By: Miladis Lofton,  October 25, 2018

## 2018-10-25 NOTE — PROGRESS NOTES
Assessment completed and documented. Crackles noted in lung sounds. Strong cough. Heart sounds regular. +3 edema in BLE. Pedal pulses palpable. Abdomen soft. Bowel sounds active. Currently resting in bed.

## 2018-10-25 NOTE — PROGRESS NOTES
Hemant Mccormack Admission Date: 10/14/2018 Daily Progress Note: 10/25/2018 The patient's chart is reviewed and the patient is discussed with the staff. Subjective:  
 
33 y.o.  female seen and evaluated at the request of Dr. Keke Lynn. She was admitted 10/14 with   cc of SOB and LE edema. 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 Patient originally treated for bacteremia/endocarditis at [facility] with nafcillin ending treatment 10/22/18. Patient used IV heroin via her PICC line while at [facility] and left AMA once they discontinued her PICC line. Once discharged, patient had no follow up and has not been taking her CHF medications until last week.   
  
Over the past few weeks, she has had increased SOB and LE edema. Admits to using IV heroinPTA. No new skin lesions.  She has been in icu rxed for sepsis.  Cards and ID are to see the pt. And she is getting lasix.  And brianne ancef for mssa endocarditis Had thoracentesis for 1000 ml ss fluid on 10/16/18 Feels better but still on 1L O2,  shubham study- 50 min with sat < 89- she has meeting with  Current Facility-Administered Medications Medication Dose Route Frequency  heparin (porcine) injection 5,000 Units  5,000 Units SubCUTAneous Q12H  torsemide (DEMADEX) tablet 20 mg  20 mg Oral BID  potassium chloride (K-DUR, KLOR-CON) SR tablet 20 mEq  20 mEq Oral DAILY  spironolactone (ALDACTONE) tablet 50 mg  50 mg Oral BID  methadone (DOLOPHINE) tablet 30 mg  30 mg Oral BID  promethazine (PHENERGAN) with saline injection 12.5 mg  12.5 mg IntraVENous Q4H PRN  
 albuterol (PROVENTIL VENTOLIN) nebulizer solution 2.5 mg  2.5 mg Nebulization QID RT  
 ondansetron (ZOFRAN) injection 4 mg  4 mg IntraVENous Q4H PRN  
  pantoprazole (PROTONIX) tablet 40 mg  40 mg Oral ACB  metoprolol succinate (TOPROL-XL) XL tablet 25 mg  25 mg Oral BID  
 NUTRITIONAL SUPPORT ELECTROLYTE PRN ORDERS   Does Not Apply PRN  
 lisinopril (PRINIVIL, ZESTRIL) tablet 2.5 mg  2.5 mg Oral DAILY  magnesium oxide (MAG-OX) tablet 400 mg  400 mg Oral TID  ferrous sulfate tablet 325 mg  1 Tab Oral TID WITH MEALS  senna-docusate (PERICOLACE) 8.6-50 mg per tablet 2 Tab  2 Tab Oral QHS  polyethylene glycol (MIRALAX) packet 17 g  17 g Oral DAILY  magnesium hydroxide (MILK OF MAGNESIA) 400 mg/5 mL oral suspension 30 mL  30 mL Oral DAILY PRN  
 sodium chloride (NS) flush 10 mL  10 mL InterCATHeter Q8H  
 sodium chloride (NS) flush 10 mL  10 mL InterCATHeter PRN  
 sodium chloride (NS) flush 5-10 mL  5-10 mL IntraVENous Q8H  
 sodium chloride (NS) flush 5-10 mL  5-10 mL IntraVENous PRN  
 acetaminophen (TYLENOL) tablet 650 mg  650 mg Oral Q4H PRN  
 ceFAZolin (ANCEF) 2 g/20 mL in sterile water IV syringe  2 g IntraVENous Q8H Review of Systems Constitutional: negative for fever, chills, sweats Cardiovascular: sob and edema Gastrointestinal:  negative for dysphagia, reflux, vomiting, diarrhea, abdominal pain, or melena Neurologic:  negative for focal weakness, numbness, headache Objective:  
 
Vitals:  
 10/24/18 2205 10/24/18 2342 10/24/18 2350 10/25/18 0796 BP:  101/63  96/63 Pulse:  97  76 Resp:  18  18 Temp:  99.4 °F (37.4 °C)  96.6 °F (35.9 °C) SpO2: 97% 92% 96% 95% Weight:      
Height:      
 
Intake and Output:  
10/23 1901 - 10/25 0700 In: -  
Out: [address] [place] No intake/output data recorded. Physical Exam:  
Constitution:  the patient is well developed and in no acute distress EENMT:  Sclera clear, pupils equal, oral mucosa moist 
Respiratory: decreased breath sounds on the R Cardiovascular:  RRR without M,G,R 
Gastrointestinal: soft and non-tender; with positive bowel sounds. Musculoskeletal: warm without cyanosis. There is 1+ lower leg edema. Skin:  no jaundice or rashes, no wounds Neurologic: no gross neuro deficits Psychiatric:  alert and oriented x 3 CXR:  
 
 
LAB No results for input(s): GLUCPOC in the last 72 hours. No lab exists for component: [place] Recent Labs 10/25/18 
0530 10/24/18 
0601 10/23/18 
8342 WBC 8.4 8.6 9.9 HGB 8.5* 8.0* 8.4* HCT 30.2* 28.3* 30.4*  407 439 Recent Labs 10/25/18 
0530 10/24/18 
0601 10/23/18 
5009 * 130* 131* K 4.5 4.4 4.5  
CL 87* 86* 88* CO2 38* 38* 38* GLU 89 87 88 BUN 26* 25* 22  
CREA 1.32* 1.34* 1.19* MG 2.3 2.2 1.9  
CA 9.0 9.0 8.9 PHOS 3.5 3.5 3.1 No results for input(s): PH, PCO2, PO2, HCO3, PHI, PCO2I, PO2I, HCO3I in the last 72 hours. No results for input(s): LCAD, LAC in the last 72 hours. 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 
ICD-9-CM: 518.81  10/23/2018 Yes  
 desats hs on 1 L Hypoproteinemia ([facility] [address] [address] ICD-10-CM: E77.8 ICD-9-CM: 273.8  10/17/2018 Unknown Pleural effusion ICD-10-CM: J90 ICD-9-CM: 511.9  10/16/2018 Unknown Overview Signed 10/24/2018  9:03 AM by Leon Lehman MD  
  Loculated on the right Thoracentesis x 1 Hypomagnesemia ICD-10-CM: Y54.43 
ICD-9-CM: 275.2  10/16/2018 Unknown Hypokalemia ICD-10-CM: E87.6 ICD-9-CM: 276.8  10/16/2018 Unknown Sepsis ([facility] ICD-10-CM: A41.9 ICD-9-CM: 038.9, 995.91  10/14/2018 Unknown MSSA bacteremia ICD-10-CM: R78.81 ICD-9-CM: 790.7, 041.11  10/14/2018 Unknown Septic arthritis ([facility] ICD-10-CM: M00.9 ICD-9-CM: 711.00  10/14/2018 Unknown  Prolonged Q-T interval on ECG ICD-10-CM: R94.31 
 ICD-9-CM: 794.31  10/14/2018 Unknown Transaminitis ICD-10-CM: R74.0 ICD-9-CM: 790.4  10/14/2018 Unknown Hyponatremia ICD-10-CM: E87.1 ICD-9-CM: 276.1  10/14/2018 Unknown Systolic CHF, acute on chronic (HCC) ICD-10-CM: N96.42 ICD-9-CM: 428.23, 428.0  8/30/2018 Yes Heroin abuse ([facility] ICD-10-CM: F11.10 ICD-9-CM: 305.50  4/10/2018 Yes Plan:  (Medical Decision Making) 1    Going out ama this am, will probably return after a meeting 2    If she is back tomorrow will try repeat thoracentesis or at least ultrasound -- 
 
More than 50% of the time documented was spent in face-to-face contact with the patient and in the care of the patient on the floor/unit where the patient is located.  
 
Cong Chandler MD

## 2018-10-25 NOTE — PROGRESS NOTES
Attempted to call [facility], but they do not open until 830 am. I have never received a call back form Mrs. Loja or her supervisor. Pt has decided to leave AMA, midline removed, AMA papers signed and pt educated on the health risk with leaving AMA. PT informed that she will need to come back in through the ED, ED informed. PT left some of her belongings, they were taken to the ED and labeled with pt's name.

## 2018-10-25 NOTE — PROGRESS NOTES
PM assessment complete. Alert, oriented x 4. No s/s distress noted. Denies needs or pain. Sitter at bedside. Aware to call should needs arise. Will monitor.

## 2018-10-25 NOTE — PROGRESS NOTES
Spoke with primary rn about concerns of placing picc line in pt with hx of (with suspicion of heroin use via her picc line) primary rn stated that pt has sitter and that a midline would be ok for pt.

## 2018-10-25 NOTE — PROGRESS NOTES
Shift assessment complete. A&OX4. Appears very drowsy. Respirations present. Even and unlabored. Lung sounds had crackles present. Apical HR is regular and 90. Abdomen is soft and non-tender with positive bowel sounds present bilaterally. No s/s of distress. Pt. has 3+ pitting edema in bilateral lower extremities. Zero c/o pain at this time. Call light within reach. Encouraged patient to call for assistance. Patient verbalizes understanding. See Doc Flowsheet for assessment details. Patient resting in bed with sitter present. Door open for observation. Will continue to monitor for any changes.

## 2018-10-25 NOTE — PROGRESS NOTES
10/24/18 2205 Oxygen Therapy O2 Sat (%) 97 % Pulse via Oximetry 104 beats per minute O2 Device Nasal cannula O2 Flow Rate (L/min) 1 l/min Patient placed on continuous overnight sat monitor # 12  and  1 L NC HS per protocol. Monitor history deleted prior to placing on patient.

## 2018-10-26 ENCOUNTER — APPOINTMENT (OUTPATIENT)
Dept: GENERAL RADIOLOGY | Age: 33
DRG: 871 | End: 2018-10-26
Attending: INTERNAL MEDICINE
Payer: SUBSIDIZED

## 2018-10-26 ENCOUNTER — HOSPITAL ENCOUNTER (OUTPATIENT)
Dept: GENERAL RADIOLOGY | Age: 33
Discharge: HOME | End: 2018-10-26
Attending: RADIOLOGY
Payer: SUBSIDIZED

## 2018-10-26 ENCOUNTER — HOSPITAL ENCOUNTER (OUTPATIENT)
Dept: INTERVENTIONAL RADIOLOGY/VASCULAR | Age: 33
Discharge: HOME | End: 2018-10-26
Attending: INTERNAL MEDICINE
Payer: SUBSIDIZED

## 2018-10-26 ENCOUNTER — HOSPITAL ENCOUNTER (OUTPATIENT)
Dept: CT IMAGING | Age: 33
Discharge: HOME | End: 2018-10-26
Attending: INTERNAL MEDICINE
Payer: SUBSIDIZED

## 2018-10-26 VITALS
SYSTOLIC BLOOD PRESSURE: 114 MMHG | OXYGEN SATURATION: 95 % | RESPIRATION RATE: 14 BRPM | DIASTOLIC BLOOD PRESSURE: 72 MMHG | HEART RATE: 93 BPM

## 2018-10-26 LAB
ANION GAP SERPL CALC-SCNC: 8 MMOL/L
BASOPHILS # BLD: 0 K/UL (ref 0–0.2)
BASOPHILS NFR BLD: 1 % (ref 0–2)
BUN SERPL-MCNC: 23 MG/DL (ref 6–23)
CALCIUM SERPL-MCNC: 8.8 MG/DL (ref 8.3–10.4)
CHLORIDE SERPL-SCNC: 90 MMOL/L (ref 98–107)
CO2 SERPL-SCNC: 37 MMOL/L (ref 21–32)
CREAT SERPL-MCNC: 1.21 MG/DL (ref 0.6–1)
DIFFERENTIAL METHOD BLD: ABNORMAL
EOSINOPHIL # BLD: 0.1 K/UL (ref 0–0.8)
EOSINOPHIL NFR BLD: 1 % (ref 0.5–7.8)
ERYTHROCYTE [DISTWIDTH] IN BLOOD BY AUTOMATED COUNT: 22.6 %
GLUCOSE FLD-MCNC: NORMAL MG/DL
GLUCOSE SERPL-MCNC: 76 MG/DL (ref 65–100)
HCT VFR BLD AUTO: 29 % (ref 35.8–46.3)
HGB BLD-MCNC: 8.3 G/DL (ref 11.7–15.4)
IMM GRANULOCYTES # BLD: 0 K/UL (ref 0–0.5)
IMM GRANULOCYTES NFR BLD AUTO: 1 % (ref 0–5)
LDH FLD L TO P-CCNC: NORMAL U/L
LYMPHOCYTES # BLD: 1.9 K/UL (ref 0.5–4.6)
LYMPHOCYTES NFR BLD: 29 % (ref 13–44)
MCH RBC QN AUTO: 26.1 PG (ref 26.1–32.9)
MCHC RBC AUTO-ENTMCNC: 28.6 G/DL (ref 31.4–35)
MCV RBC AUTO: 91.2 FL (ref 79.6–97.8)
MONOCYTES # BLD: 0.7 K/UL (ref 0.1–1.3)
MONOCYTES NFR BLD: 11 % (ref 4–12)
NEUTS SEG # BLD: 3.8 K/UL (ref 1.7–8.2)
NEUTS SEG NFR BLD: 58 % (ref 43–78)
NRBC # BLD: 0.02 K/UL (ref 0–0.2)
PLATELET # BLD AUTO: 374 K/UL (ref 150–450)
PMV BLD AUTO: 9.3 FL (ref 9.4–12.3)
POTASSIUM SERPL-SCNC: 3.9 MMOL/L (ref 3.5–5.1)
PROT FLD-MCNC: NORMAL G/DL
RBC # BLD AUTO: 3.18 M/UL (ref 4.05–5.2)
SODIUM SERPL-SCNC: 135 MMOL/L (ref 136–145)
SPECIMEN SOURCE FLD: NORMAL
WBC # BLD AUTO: 6.5 K/UL (ref 4.3–11.1)

## 2018-10-26 PROCEDURE — 85025 COMPLETE CBC W/AUTO DIFF WBC: CPT

## 2018-10-26 PROCEDURE — 71046 X-RAY EXAM CHEST 2 VIEWS: CPT

## 2018-10-26 PROCEDURE — 83615 LACTATE (LD) (LDH) ENZYME: CPT

## 2018-10-26 PROCEDURE — 32555 ASPIRATE PLEURA W/ IMAGING: CPT | Performed by: INTERNAL MEDICINE

## 2018-10-26 PROCEDURE — 65270000029 HC RM PRIVATE

## 2018-10-26 PROCEDURE — 77010033678 HC OXYGEN DAILY

## 2018-10-26 PROCEDURE — C1729 CATH, DRAINAGE: HCPCS

## 2018-10-26 PROCEDURE — 32555 ASPIRATE PLEURA W/ IMAGING: CPT

## 2018-10-26 PROCEDURE — 84157 ASSAY OF PROTEIN OTHER: CPT

## 2018-10-26 PROCEDURE — 88305 TISSUE EXAM BY PATHOLOGIST: CPT

## 2018-10-26 PROCEDURE — 88112 CYTOPATH CELL ENHANCE TECH: CPT

## 2018-10-26 PROCEDURE — 74011000250 HC RX REV CODE- 250: Performed by: INTERNAL MEDICINE

## 2018-10-26 PROCEDURE — 71045 X-RAY EXAM CHEST 1 VIEW: CPT

## 2018-10-26 PROCEDURE — 80048 BASIC METABOLIC PNL TOTAL CA: CPT

## 2018-10-26 PROCEDURE — 74011250636 HC RX REV CODE- 250/636

## 2018-10-26 PROCEDURE — 94640 AIRWAY INHALATION TREATMENT: CPT

## 2018-10-26 PROCEDURE — 74011250636 HC RX REV CODE- 250/636: Performed by: INTERNAL MEDICINE

## 2018-10-26 PROCEDURE — 0W993ZZ DRAINAGE OF RIGHT PLEURAL CAVITY, PERCUTANEOUS APPROACH: ICD-10-PCS | Performed by: INTERNAL MEDICINE

## 2018-10-26 PROCEDURE — 74011250636 HC RX REV CODE- 250/636: Performed by: RADIOLOGY

## 2018-10-26 PROCEDURE — 94760 N-INVAS EAR/PLS OXIMETRY 1: CPT

## 2018-10-26 PROCEDURE — 87205 SMEAR GRAM STAIN: CPT

## 2018-10-26 PROCEDURE — 36415 COLL VENOUS BLD VENIPUNCTURE: CPT

## 2018-10-26 PROCEDURE — 74011250637 HC RX REV CODE- 250/637: Performed by: INTERNAL MEDICINE

## 2018-10-26 PROCEDURE — 82945 GLUCOSE OTHER FLUID: CPT

## 2018-10-26 PROCEDURE — 99232 SBSQ HOSP IP/OBS MODERATE 35: CPT | Performed by: INTERNAL MEDICINE

## 2018-10-26 RX ORDER — SPIRONOLACTONE 25 MG/1
50 TABLET ORAL 2 TIMES DAILY
Status: DISCONTINUED | OUTPATIENT
Start: 2018-10-26 | End: 2018-10-27

## 2018-10-26 RX ORDER — HEPARIN SODIUM 5000 [USP'U]/ML
5000 INJECTION, SOLUTION INTRAVENOUS; SUBCUTANEOUS EVERY 12 HOURS
Status: DISCONTINUED | OUTPATIENT
Start: 2018-10-26 | End: 2018-11-05 | Stop reason: HOSPADM

## 2018-10-26 RX ORDER — ADHESIVE BANDAGE
30 BANDAGE TOPICAL DAILY PRN
Status: DISCONTINUED | OUTPATIENT
Start: 2018-10-26 | End: 2018-11-05 | Stop reason: HOSPADM

## 2018-10-26 RX ORDER — MIDAZOLAM HYDROCHLORIDE 1 MG/ML
.5-2 INJECTION, SOLUTION INTRAMUSCULAR; INTRAVENOUS
Status: DISCONTINUED | OUTPATIENT
Start: 2018-10-26 | End: 2018-10-26

## 2018-10-26 RX ORDER — ONDANSETRON 2 MG/ML
4 INJECTION INTRAMUSCULAR; INTRAVENOUS
Status: DISCONTINUED | OUTPATIENT
Start: 2018-10-26 | End: 2018-10-26 | Stop reason: HOSPADM

## 2018-10-26 RX ORDER — LIDOCAINE HYDROCHLORIDE 20 MG/ML
1-10 INJECTION, SOLUTION EPIDURAL; INFILTRATION; INTRACAUDAL; PERINEURAL ONCE
Status: COMPLETED | OUTPATIENT
Start: 2018-10-26 | End: 2018-10-26

## 2018-10-26 RX ORDER — METOPROLOL SUCCINATE 25 MG/1
25 TABLET, EXTENDED RELEASE ORAL 2 TIMES DAILY
Status: DISCONTINUED | OUTPATIENT
Start: 2018-10-26 | End: 2018-10-28

## 2018-10-26 RX ORDER — LANOLIN ALCOHOL/MO/W.PET/CERES
1 CREAM (GRAM) TOPICAL
Status: DISCONTINUED | OUTPATIENT
Start: 2018-10-26 | End: 2018-11-05 | Stop reason: HOSPADM

## 2018-10-26 RX ORDER — SODIUM CHLORIDE 0.9 % (FLUSH) 0.9 %
10 SYRINGE (ML) INJECTION AS NEEDED
Status: DISCONTINUED | OUTPATIENT
Start: 2018-10-26 | End: 2018-11-05 | Stop reason: HOSPADM

## 2018-10-26 RX ORDER — TORSEMIDE 20 MG/1
20 TABLET ORAL 2 TIMES DAILY
Status: DISCONTINUED | OUTPATIENT
Start: 2018-10-26 | End: 2018-11-05 | Stop reason: HOSPADM

## 2018-10-26 RX ORDER — POTASSIUM CHLORIDE 20 MEQ/1
20 TABLET, EXTENDED RELEASE ORAL DAILY
Status: DISCONTINUED | OUTPATIENT
Start: 2018-10-27 | End: 2018-11-05 | Stop reason: HOSPADM

## 2018-10-26 RX ORDER — POLYETHYLENE GLYCOL 3350 17 G/17G
17 POWDER, FOR SOLUTION ORAL DAILY
Status: DISCONTINUED | OUTPATIENT
Start: 2018-10-27 | End: 2018-11-05 | Stop reason: HOSPADM

## 2018-10-26 RX ORDER — SODIUM CHLORIDE 0.9 % (FLUSH) 0.9 %
5-10 SYRINGE (ML) INJECTION AS NEEDED
Status: DISCONTINUED | OUTPATIENT
Start: 2018-10-26 | End: 2018-11-05 | Stop reason: HOSPADM

## 2018-10-26 RX ORDER — METHADONE HYDROCHLORIDE 10 MG/1
30 TABLET ORAL 2 TIMES DAILY
Status: DISCONTINUED | OUTPATIENT
Start: 2018-10-26 | End: 2018-11-05 | Stop reason: HOSPADM

## 2018-10-26 RX ORDER — LANOLIN ALCOHOL/MO/W.PET/CERES
400 CREAM (GRAM) TOPICAL 3 TIMES DAILY
Status: DISCONTINUED | OUTPATIENT
Start: 2018-10-26 | End: 2018-11-05 | Stop reason: HOSPADM

## 2018-10-26 RX ORDER — ALBUTEROL SULFATE 0.83 MG/ML
2.5 SOLUTION RESPIRATORY (INHALATION)
Status: DISCONTINUED | OUTPATIENT
Start: 2018-10-26 | End: 2018-10-26

## 2018-10-26 RX ORDER — CEFAZOLIN SODIUM/WATER 2 G/20 ML
2 SYRINGE (ML) INTRAVENOUS EVERY 8 HOURS
Status: DISCONTINUED | OUTPATIENT
Start: 2018-10-26 | End: 2018-11-05 | Stop reason: SDUPTHER

## 2018-10-26 RX ORDER — LISINOPRIL 5 MG/1
2.5 TABLET ORAL DAILY
Status: DISCONTINUED | OUTPATIENT
Start: 2018-10-27 | End: 2018-10-27

## 2018-10-26 RX ORDER — ALPRAZOLAM 0.5 MG/1
1 TABLET ORAL ONCE
Status: COMPLETED | OUTPATIENT
Start: 2018-10-26 | End: 2018-10-26

## 2018-10-26 RX ORDER — SODIUM CHLORIDE 0.9 % (FLUSH) 0.9 %
10 SYRINGE (ML) INJECTION EVERY 8 HOURS
Status: DISCONTINUED | OUTPATIENT
Start: 2018-10-26 | End: 2018-11-05 | Stop reason: HOSPADM

## 2018-10-26 RX ORDER — SODIUM CHLORIDE 0.9 % (FLUSH) 0.9 %
5-10 SYRINGE (ML) INJECTION EVERY 8 HOURS
Status: DISCONTINUED | OUTPATIENT
Start: 2018-10-26 | End: 2018-11-05 | Stop reason: HOSPADM

## 2018-10-26 RX ORDER — ALBUTEROL SULFATE 0.83 MG/ML
SOLUTION RESPIRATORY (INHALATION)
Status: ACTIVE
Start: 2018-10-26 | End: 2018-10-27

## 2018-10-26 RX ORDER — SODIUM CHLORIDE 9 MG/ML
25 INJECTION, SOLUTION INTRAVENOUS CONTINUOUS
Status: DISCONTINUED | OUTPATIENT
Start: 2018-10-26 | End: 2018-10-26

## 2018-10-26 RX ORDER — LIDOCAINE HYDROCHLORIDE 10 MG/ML
6 INJECTION INFILTRATION; PERINEURAL ONCE
Status: COMPLETED | OUTPATIENT
Start: 2018-10-26 | End: 2018-10-26

## 2018-10-26 RX ORDER — ALBUTEROL SULFATE 0.83 MG/ML
2.5 SOLUTION RESPIRATORY (INHALATION)
Status: DISCONTINUED | OUTPATIENT
Start: 2018-10-26 | End: 2018-10-29

## 2018-10-26 RX ORDER — ACETAMINOPHEN 325 MG/1
650 TABLET ORAL
Status: DISCONTINUED | OUTPATIENT
Start: 2018-10-26 | End: 2018-11-05 | Stop reason: HOSPADM

## 2018-10-26 RX ORDER — ONDANSETRON 2 MG/ML
4 INJECTION INTRAMUSCULAR; INTRAVENOUS
Status: DISCONTINUED | OUTPATIENT
Start: 2018-10-26 | End: 2018-11-05 | Stop reason: HOSPADM

## 2018-10-26 RX ORDER — AMOXICILLIN 250 MG
2 CAPSULE ORAL
Status: DISCONTINUED | OUTPATIENT
Start: 2018-10-26 | End: 2018-11-05 | Stop reason: HOSPADM

## 2018-10-26 RX ORDER — PANTOPRAZOLE SODIUM 40 MG/1
40 TABLET, DELAYED RELEASE ORAL
Status: DISCONTINUED | OUTPATIENT
Start: 2018-10-27 | End: 2018-11-05 | Stop reason: HOSPADM

## 2018-10-26 RX ADMIN — SODIUM CHLORIDE 12.5 MG: 9 INJECTION INTRAMUSCULAR; INTRAVENOUS; SUBCUTANEOUS at 00:01

## 2018-10-26 RX ADMIN — METHADONE HYDROCHLORIDE 30 MG: 10 TABLET ORAL at 09:25

## 2018-10-26 RX ADMIN — Medication 10 ML: at 05:29

## 2018-10-26 RX ADMIN — TORSEMIDE 20 MG: 20 TABLET ORAL at 09:26

## 2018-10-26 RX ADMIN — Medication 10 ML: at 17:51

## 2018-10-26 RX ADMIN — ALPRAZOLAM 1 MG: 0.5 TABLET ORAL at 10:56

## 2018-10-26 RX ADMIN — ALBUTEROL SULFATE 2.5 MG: 2.5 SOLUTION RESPIRATORY (INHALATION) at 21:01

## 2018-10-26 RX ADMIN — LIDOCAINE HYDROCHLORIDE 30 ML: 20 INJECTION, SOLUTION EPIDURAL; INFILTRATION; INTRACAUDAL; PERINEURAL at 14:56

## 2018-10-26 RX ADMIN — SENNOSIDES AND DOCUSATE SODIUM 2 TABLET: 8.6; 5 TABLET ORAL at 22:33

## 2018-10-26 RX ADMIN — POTASSIUM CHLORIDE 20 MEQ: 20 TABLET, EXTENDED RELEASE ORAL at 09:26

## 2018-10-26 RX ADMIN — Medication 10 ML: at 22:33

## 2018-10-26 RX ADMIN — ACETAMINOPHEN 650 MG: 325 TABLET, FILM COATED ORAL at 17:48

## 2018-10-26 RX ADMIN — Medication 2 G: at 00:00

## 2018-10-26 RX ADMIN — METOPROLOL SUCCINATE 25 MG: 25 TABLET, EXTENDED RELEASE ORAL at 09:26

## 2018-10-26 RX ADMIN — FERROUS SULFATE TAB 325 MG (65 MG ELEMENTAL FE) 325 MG: 325 (65 FE) TAB at 17:48

## 2018-10-26 RX ADMIN — Medication 2 G: at 17:43

## 2018-10-26 RX ADMIN — Medication: at 00:08

## 2018-10-26 RX ADMIN — Medication 400 MG: at 22:33

## 2018-10-26 RX ADMIN — PANTOPRAZOLE SODIUM 40 MG: 40 TABLET, DELAYED RELEASE ORAL at 09:25

## 2018-10-26 RX ADMIN — Medication 6 ML: at 08:00

## 2018-10-26 RX ADMIN — LISINOPRIL 2.5 MG: 5 TABLET ORAL at 09:25

## 2018-10-26 RX ADMIN — Medication 400 MG: at 17:48

## 2018-10-26 RX ADMIN — SPIRONOLACTONE 50 MG: 25 TABLET ORAL at 09:24

## 2018-10-26 RX ADMIN — Medication 10 ML: at 09:17

## 2018-10-26 RX ADMIN — ONDANSETRON 4 MG: 2 INJECTION INTRAMUSCULAR; INTRAVENOUS at 09:17

## 2018-10-26 RX ADMIN — ALBUTEROL SULFATE 2.5 MG: 2.5 SOLUTION RESPIRATORY (INHALATION) at 07:38

## 2018-10-26 RX ADMIN — Medication 2 G: at 09:17

## 2018-10-26 RX ADMIN — ONDANSETRON HYDROCHLORIDE 4 MG: 2 INJECTION INTRAMUSCULAR; INTRAVENOUS at 17:43

## 2018-10-26 RX ADMIN — FERROUS SULFATE TAB 325 MG (65 MG ELEMENTAL FE) 325 MG: 325 (65 FE) TAB at 09:24

## 2018-10-26 RX ADMIN — Medication 400 MG: at 09:26

## 2018-10-26 RX ADMIN — HEPARIN SODIUM 5000 UNITS: 5000 INJECTION INTRAVENOUS; SUBCUTANEOUS at 17:46

## 2018-10-26 NOTE — PROGRESS NOTES
TRANSFER - OUT REPORT: 
 
Verbal report given to Kemal(name) on Camille Vallejo  being transferred to IR(unit) for ordered procedure Report consisted of patients Situation, Background, Assessment and  
Recommendations(SBAR). Information from the following report(s) SBAR was reviewed with the receiving nurse. Lines:    
 
Opportunity for questions and clarification was provided. Patient transported with: 
 O2 @ 1 liters

## 2018-10-26 NOTE — PROGRESS NOTES
Hospitalist Progress Note Patient: Gelacio Banegas MRN: [id number]  SSN: [id number] YOB: 1985  Age: 28 y.o. Sex: female Admit Date: 10/14/2018 LOS: 12 days Subjective:  
 
From previous notes: \"29 year old female with history of CHF EF 10% diagnosed 4/18, IVDU heroin, presented to the ED with worsening shortness of breath pedal edema, with on going heroin use. She had signed out of [facility]  9/28 Where she was being treated for MSSA bacteremia/endocarditis, (with suspicion of heroin use via her picc line) with end date at that time 10/14. Since then she was off her CHF medications. And had not received any antibiotics She presented with pain in hip, worsening shortness of breath, and decompensated CHF. She has been started on cefazolin here by ID with EOT 11/5. We have diuresed her 10L, and currently is on spironolactone and torsemide with on going good diuresis. \" 
 
10/24 - No new acute issues. She states that she was up most of the night due to machines \"beeping at me. \" Denies CP/SOB. Denies F/C/N/V. 
10/25 - The patient feels good after returning. She denies CP/SOB. Denies F/C/N/V. 
10/26 - The patient had a thoracentesis this morning. Does not feel any different. Denies CP/SOB. Complains of being anxious about possible chest tube. Review of systems negative except stated above. Objective:  
 
Visit Vitals /67 (BP 1 Location: Left arm, BP Patient Position: At rest) Pulse 93 Temp 98.2 °F (36.8 °C) Resp 15 Ht 6' (1.829 m) Wt 79.9 kg (176 lb 3.2 oz) SpO2 97% Breastfeeding? No  
BMI 23.90 kg/m² Oxygen Therapy O2 Sat (%): 97 % (10/26/18 0922) Pulse via Oximetry: 84 beats per minute (10/26/18 0738) O2 Device: Nasal cannula (10/26/18 0738) O2 Flow Rate (L/min): 1 l/min (10/26/18 0738) FIO2 (%): 24 % (10/26/18 8825) Intake and Output:  
 
Intake/Output Summary (Last 24 hours) at 10/26/2018 1013 Last data filed at 10/26/2018 4297 Gross per 24 hour Intake 1290 ml Output 4200 ml Net -2910 ml Physical Exam:  
GENERAL: alert, cooperative, no distress, appears stated age EYE: conjunctivae/corneas clear. PERRL. THROAT & NECK: normal and no erythema or exudates noted. LUNG: clear to auscultation bilaterally HEART: regular rate and rhythm, S1S2, no murmur, no JVD ABDOMEN: soft, non-tender, non-distended. Bowel sounds normal.  
EXTREMITIES:  2+ BLE edema, 2+ pedal/radial pulses bilaterally SKIN: no rash or abnormalities NEUROLOGIC: A&Ox3. Cranial nerves 2-12 grossly intact. Lab/Data Review: 
Recent Results (from the past 24 hour(s)) DRUG SCREEN, URINE Collection Time: 10/25/18  2:15 PM  
Result Value Ref Range PCP(PHENCYCLIDINE) NEGATIVE BENZODIAZEPINES NEGATIVE     
 COCAINE NEGATIVE AMPHETAMINES NEGATIVE METHADONE POSITIVE    
 THC (TH-CANNABINOL) NEGATIVE     
 OPIATES NEGATIVE     
 BARBITURATES NEGATIVE METABOLIC PANEL, BASIC Collection Time: 10/26/18  6:02 AM  
Result Value Ref Range Sodium 135 (L) 136 - 145 mmol/L Potassium 3.9 3.5 - 5.1 mmol/L Chloride 90 (L) 98 - 107 mmol/L  
 CO2 37 (H) 21 - 32 mmol/L Anion gap 8 mmol/L Glucose 76 65 - 100 mg/dL BUN 23 6 - 23 MG/DL Creatinine 1.21 (H) 0.6 - 1.0 MG/DL  
 GFR est AA >60 >60 ml/min/1.73m2 GFR est non-AA 54 ml/min/1.73m2 Calcium 8.8 8.3 - 10.4 MG/DL  
CBC WITH AUTOMATED DIFF Collection Time: 10/26/18  6:02 AM  
Result Value Ref Range WBC 6.5 4.3 - 11.1 K/uL  
 RBC 3.18 (L) 4.05 - 5.2 M/uL HGB 8.3 (L) 11.7 - 15.4 g/dL HCT 29.0 (L) 35.8 - 46.3 % MCV 91.2 79.6 - 97.8 FL  
 MCH 26.1 26.1 - 32.9 PG  
 MCHC 28.6 (L) 31.4 - 35.0 g/dL RDW 22.6 % PLATELET 751 711 - 992 K/uL MPV 9.3 (L) 9.4 - 12.3 FL ABSOLUTE NRBC 0.02 0.0 - 0.2 K/uL  
 DF AUTOMATED NEUTROPHILS 58 43 - 78 % LYMPHOCYTES 29 13 - 44 % MONOCYTES 11 4.0 - 12.0 % EOSINOPHILS 1 0.5 - 7.8 %  BASOPHILS 1 0.0 - 2.0 %  
 IMMATURE GRANULOCYTES 1 0.0 - 5.0 %  
 ABS. NEUTROPHILS 3.8 1.7 - 8.2 K/UL  
 ABS. LYMPHOCYTES 1.9 0.5 - 4.6 K/UL  
 ABS. MONOCYTES 0.7 0.1 - 1.3 K/UL  
 ABS. EOSINOPHILS 0.1 0.0 - 0.8 K/UL  
 ABS. BASOPHILS 0.0 0.0 - 0.2 K/UL  
 ABS. IMM. GRANS. 0.0 0.0 - 0.5 K/UL Imaging: Xr Chest Sngl V Result Date: 10/18/2018 Impression:  No significant interval change. Xr Chest Pa Lat Result Date: 10/26/2018 Impression: Stable two-view chest.  
 
Xr Chest Pa Lat Result Date: 10/23/2018 IMPRESSION: Bilateral pleural effusions with lower lobe atelectasis or consolidation. These may be complex effusions. Xr Chest Pa Lat Result Date: 10/14/2018 IMPRESSION: Cardiomegaly and large pleural effusions, right more than left. Pleural effusions are new since 30 August 2018 Xr Chest [facility] Result Date: 10/16/2018 IMPRESSION: Suspected pleural effusions with bilateral lower lobe atelectasis or consolidation. No results found for this visit on 10/14/18. Cultures: All Micro Results Procedure Component Value Units Date/Time CULTURE, BODY FLUID Vinnie Null [[id number]] Collected:  10/26/18 0830 Order Status:  Completed Specimen:  Pleural Fluid Updated:  10/26/18 8945 CULTURE, BLOOD [[id number]] Collected:  10/17/18 1321 Order Status:  Completed Specimen:  Blood Updated:  10/22/18 0815 Special Requests: --     
  RIGHT 
HAND Culture result: NO GROWTH 5 DAYS     
 CULTURE, BLOOD [[id number]] Collected:  10/17/18 1319 Order Status:  Completed Specimen:  Blood Updated:  10/22/18 0815 Special Requests: --     
  RIGHT 
HAND Culture result: NO GROWTH 5 DAYS     
 CULTURE, BLOOD [[id number]] Collected:  10/15/18 2233 Order Status:  Completed Specimen:  Blood Updated:  10/20/18 1150 Special Requests: RIGHT ANTECUBITAL Culture result: NO GROWTH 5 DAYS     
 CULTURE, BLOOD [[id number]] Collected:  10/15/18 2235 Order Status:  Completed Specimen:  Blood Updated:  10/20/18 1150 Special Requests: RIGHT ARM Culture result: NO GROWTH 5 DAYS FUNGUS CULTURE AND SMEAR [[id number]] Collected:  10/16/18 0540 Order Status:  Completed Specimen:  Other from Miscellaneous sample Updated:  10/19/18 1138 Source THORACENTESIS Comment: RIGHT Corrected on 10/16 AT 0951: This is a correction to the medical record of the test results previously reported as THORACENTESIS Fungus stain Direct Inoculation Fungus (Mycology) Culture Other source received Comment: (NOTE) Performed At: [address] [id number] Pippa Vargas MD JX:[id number] CULTURE, BLOOD [[id number]] Collected:  10/14/18 1250 Order Status:  Completed Specimen:  Whole Blood Updated:  10/19/18 0809 Special Requests: --     
  RIGHT 
HAND Culture result: NO GROWTH 5 DAYS     
 CULTURE, BLOOD [[id number]] Collected:  10/14/18 1251 Order Status:  Completed Specimen:  Whole Blood Updated:  10/19/18 0809 Special Requests: --     
  HAND 
LEFT Culture result: NO GROWTH 5 DAYS     
 CULTURE, BODY FLUID W Buraguilar Irelandllon [[id number]] Collected:  10/16/18 1288 Order Status:  Completed Specimen:  Thoracentesis Updated:  10/18/18 5660 Special Requests: NO SPECIAL REQUESTS     
  GRAM STAIN 0 TO 10 WBC'S/OIF  
   NO DEFINITE ORGANISM SEEN Culture result: NO GROWTH 2 DAYS     
 AFB CULTURE + SMEAR W/RFLX ID FROM CULTURE [[id number]] Collected:  10/16/18 0951 Order Status:  Completed Specimen:  Miscellaneous sample Updated:  10/17/18 1736 Source THORACENTESIS Comment: RIGHT Corrected on 10/16 AT 0951: This is a correction to the medical record of the test results previously reported as THORACENTESIS 
  
  
  AFB Specimen processing Concentration Acid Fast Smear NEGATIVE Comment: (NOTE) Performed At: [facility] [address] [place], [place] [id number] Ck Milan MD EI:[id number] Acid Fast Culture PENDING  
 CULTURE, BLOOD [[id number]] Collected:  10/14/18 1300 Order Status:  Canceled Specimen:  Whole Blood Assessment/Plan:  
 
Principal Problem: 
  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 
- Continue Cefazolin - EOT 11/5/18 Active Problems: MSSA bacteremia (10/14/2018) - Due to IVDU 
- Continue Cefazolin - EOT 11/5/18 Sepsis ([address] (10/14/2018) - Due to #1 
- Resolved with abx Septic arthritis ([address] (10/14/2018) - Improving 
- Continue Cefazolin Systolic CHF, acute on chronic ([address] (8/30/2018) - EF 10% 
- Continue Demadex 20mg BID 
- Continue Spironolactone 50mg BID 
- Continue Toprol BID 
- Continue Lisinopril Acute respiratory failure ([address] (10/23/2018) - Improving with duresis - Continue oxygen - Pulmonary following Prolonged Q-T interval on ECG (10/14/2018) Hyponatremia (10/14/2018) - Resolved Pleural effusion (10/16/2018) - Persistent --> appeared loculated on U/S 
- Repeat thoracentesis with 300ml off --> CXR unchanged - Consulted IR for chest tube placement - Pulmonary following Hypomagnesemia (10/16/2018) - Mag 2.2 
- Continue to follow Hypokalemia (10/16/2018) - Resolved Heroin abuse ([address] (4/10/2018) - Continue Methadone 
- CANNOT leave with PICC line Transaminitis (10/14/2018) - Resolved Hypoproteinemia ([address] (10/17/2018) - Due to drug use and lack of appetite Dispo - Continue Cefazolin EOT 11/5/18. Continue diuresis. To go to IR for chest tube placement. DIET NUTRITIONAL SUPPLEMENTS All Meals; Ensure [employer] DIET NPO 
  
DVT Prophylaxis: Heparin Signed By: Adalberto Milan DO October 26, 2018

## 2018-10-26 NOTE — PROGRESS NOTES
IR Nurse Pre-Procedure Checklist Part 2 Consent signed: Yes 
 
H&P complete:  Yes (inpatient) Antibiotics: Not applicable Airway/Mallampati Done: Not Applicable Shaved: Not applicable Pregnancy Form:Yes Patient Position: Yes MD Side: Yes Biopsy Worksheet: Not applicable Specimen Medium: Not applicable

## 2018-10-26 NOTE — H&P
Date of Surgery Update: 
Karine Day was seen and examined. History and physical has been reviewed. The patient has been examined.  There have been no significant clinical changes since the completion of the originally dated History and Physical. 
 
Signed By: Paloma Parra MD   
 October 26, 2018 8:26 AM

## 2018-10-26 NOTE — PROCEDURES
Interventional Radiology Brief Procedure Note    Patient: Low Parada MRN: [id number]  SSN: [id number]    YOB: 1985  Age: 28 y.o. Sex: female      Date of Procedure: 10/26/2018    Pre-Procedure Diagnosis: loculated rt pleural effusion    Post-Procedure Diagnosis: SAME    Procedure(s): US guided thoracentesis X 3 separate loculations. Brief Description of Procedure: [address] needle/catheters. Into 3 separate locations     Performed By: Kita Ayon MD     Assistants: None    Anesthesia: Lidocaine    Estimated Blood Loss: Less than 10ml    Specimens: straw colored fluid from #1, serosanguinous fluid from #2, #3. ,  Few small residual areas. Total drained over 250 ml. Implants: None    Findings: clear. Complications: None    Recommendations: PA CXR ASAP. Follow Up: pulmonary.     Signed By: Kita Ayon MD     October 26, 2018

## 2018-10-26 NOTE — PROGRESS NOTES
PM assessment complete. Alert, oriented x 4. No s/s pain or distress noted. Denies needs or pain. Sitter at bedside. Will monitor.

## 2018-10-26 NOTE — PROGRESS NOTES
TRANSFER - OUT REPORT: 
 
Verbal report given to RN on Joseph Liu  being transferred to [address] for routine progression of care Report consisted of patients Situation, Background, Assessment and  
Recommendations(SBAR). Information from the following report(s) Procedure Summary and Intake/Output was reviewed with the receiving nurse. Lines:    
 
Opportunity for questions and clarification was provided. Patient transported with: 
 O2 @ 1 liters Tech Transported with [employer].

## 2018-10-26 NOTE — PROCEDURES
PROCEDURE:    DIAGNOSTIC/THERAPEUTIC THORACENTESIS/PLEURAL MANNOMETRY. PRE-OP DIAGNOSIS:    R PLEURAL EFFUSION    POST-OP DIAGNOSIS:    R PLEURAL EFFUSION    ASSISTANT:        ANESTHESIA:    LOCAL ANESTHESIA WITH 1% LIDOCAINE 10 CC TOTAL. CHEST ULTRASOUND FINDINGS:    A Turbo-M, Sonosite ultrasound with a 5-16 mHz probe was used to image the chest and localize the pleural effusion on the Left/and/Right chest.      A moderate/  complex pleural space was identified on ultrasound, there were multiple loculations and septetions present, with the pleural fluid having a mixed echogenic character. DESCRIPTION OF PROCEDURE:    After obtaining informed consent and localizing the safest location for thoracentesis, the  8th intercostal space was marked with a blunt, plastic needle cap in the mid scapular line. An ALENTY AK-0100 Pleral-Seal thoracentesis kit was used to perform the procedure. The skin was  cleansed with the supplied  chlorhexididne swab and then draped in the usual fasion. Using the previously marked location as a giude, a 22 G 1.5 inch needle was used to inject 10 cc of 1% lidocaine into the skin and subcutaneous tissue, as well as onto the underlying rib and inter-costal muscles, pleural fluid was aspirated to assure proper location, prior to removing the anesthesia needle. A 3mm  incision was then made, with the supplied scalpel in the usual fashion to facilitate the insertiopn of the thoracentesis needle. The needle with an 8French thoracentesis catheter was then introduced into the chest through the previously made incision in the usual fashion, the rib localized with the needle, and the catheter then marched over the rib into the pleural space. After aspirating fluid, the thoracentesis catheter was then placed into the chest using the needle itself as a trocar.   The needle was then removed and the catheter was attached to the supplied tubing without complication. 300 cc of /dark brown/ fluid, was aspirated and sent for analysis.         Fluid was sent for the following tests:    Cell count with differential  LDH  Glucose  Total protein    Routine culture and Gram stain      Post procedure US not done    EBL:     minimal      COMPLICATIONS:    none    Karyn Warren MD

## 2018-10-26 NOTE — PROGRESS NOTES
[facility]: [facility] Daily Progress Note: 10/26/2018 The patient's chart is reviewed and the patient is discussed with the staff. Subjective:  
 
33 y.o.  female seen and evaluated at the request of Dr. Gerard Sullivan. She was admitted 10/14 with   cc of SOB and LE edema. 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 Patient originally treated for bacteremia/endocarditis at [facility] with nafcillin ending treatment 10/22/18. Patient used IV heroin via her PICC line while at [facility] and left AMA once they discontinued her PICC line. Once discharged, patient had no follow up and has not been taking her CHF medications until last week.   
  
Over the past few weeks, she has had increased SOB and LE edema. Admits to using IV heroinPTA. No new skin lesions.  She has been in icu rxed for sepsis.  Cards and ID are to see the pt. And she is getting lasix.  And brianne ancef for mssa endocarditis Had thoracentesis for 1000 ml ss fluid on 10/16/18-ldh on fluid was 732 Currently doing better and has been diuresing. Current Facility-Administered Medications Medication Dose Route Frequency  lidocaine (XYLOCAINE) 10 mg/mL (1 %) injection 6 mL  6 mL IntraDERMal ONCE  
 sodium chloride (NS) flush 5-10 mL  5-10 mL IntraVENous Q8H  
 sodium chloride (NS) flush 5-10 mL  5-10 mL IntraVENous PRN  
 heparin (porcine) injection 5,000 Units  5,000 Units SubCUTAneous Q12H  torsemide (DEMADEX) tablet 20 mg  20 mg Oral BID  potassium chloride (K-DUR, KLOR-CON) SR tablet 20 mEq  20 mEq Oral DAILY  spironolactone (ALDACTONE) tablet 50 mg  50 mg Oral BID  methadone (DOLOPHINE) tablet 30 mg  30 mg Oral BID  promethazine (PHENERGAN) with saline injection 12.5 mg  12.5 mg IntraVENous Q6H PRN  
  albuterol (PROVENTIL VENTOLIN) nebulizer solution 2.5 mg  2.5 mg Nebulization QID RT  
 pantoprazole (PROTONIX) tablet 40 mg  40 mg Oral ACB  metoprolol succinate (TOPROL-XL) XL tablet 25 mg  25 mg Oral BID  lisinopril (PRINIVIL, ZESTRIL) tablet 2.5 mg  2.5 mg Oral DAILY  magnesium oxide (MAG-OX) tablet 400 mg  400 mg Oral TID  ferrous sulfate tablet 325 mg  1 Tab Oral TID WITH MEALS  senna-docusate (PERICOLACE) 8.6-50 mg per tablet 2 Tab  2 Tab Oral QHS  polyethylene glycol (MIRALAX) packet 17 g  17 g Oral DAILY  magnesium hydroxide (MILK OF MAGNESIA) 400 mg/5 mL oral suspension 30 mL  30 mL Oral DAILY PRN  
 acetaminophen (TYLENOL) tablet 650 mg  650 mg Oral Q6H PRN  
 ceFAZolin (ANCEF) 2 g/20 mL in sterile water IV syringe  2 g IntraVENous Q8H  
 sodium chloride (NS) flush 10 mL  10 mL InterCATHeter Q8H  
 sodium chloride (NS) flush 10 mL  10 mL InterCATHeter PRN  
 lip protectant (BLISTEX) ointment   Topical PRN Review of Systems Constitutional: negative for fever, chills, sweats Cardiovascular: negative for chest pain, palpitations, syncope, edema Gastrointestinal:  negative for dysphagia, reflux, vomiting, diarrhea, abdominal pain, or melena Neurologic:  negative for focal weakness, numbness, headache Objective:  
 
Vitals:  
 10/25/18 2349 10/26/18 0404 10/26/18 4315 10/26/18 5771 BP: 102/67 98/68 Pulse: 88 89 Resp: 16 17 Temp: 98.2 °F (36.8 °C) 98 °F (36.7 °C) SpO2: 97% 98%  98% Weight:   176 lb 3.2 oz (79.9 kg) Height:      
 
Intake and Output:  
10/24 1901 - 10/26 0700 In: 0519 [P.O.:1170] Out: 1400 [BBSRX:8661] 10/26 0701 - 10/26 1900 In: -  
Out: [address] Physical Exam:  
Constitution:  the patient is well developed and in no acute distress EENMT:  Sclera clear, pupils equal, oral mucosa moist 
Respiratory: decreased breath sounds on the R Cardiovascular:  RRR without M,G,R 
 Gastrointestinal: soft and non-tender; with positive bowel sounds. Musculoskeletal: warm without cyanosis. There is 1+ lower leg edema. Skin:  no jaundice or rashes, no wounds Neurologic: no gross neuro deficits Psychiatric:  alert and oriented x 3 CXR:  
 
 
LAB No results for input(s): GLUCPOC in the last 72 hours. No lab exists for component: [place] Recent Labs 10/26/18 
0602 10/25/18 
0530 10/24/18 
0542 WBC 6.5 8.4 8.6 HGB 8.3* 8.5* 8.0*  
HCT 29.0* 30.2* 28.3*  
 416 407 Recent Labs 10/26/18 
0602 10/25/18 
0530 10/24/18 
9444 * 132* 130*  
K 3.9 4.5 4.4 CL 90* 87* 86* CO2 37* 38* 38* GLU 76 89 87 BUN 23 26* 25* CREA 1.21* 1.32* 1.34* MG  --  2.3 2.2 CA 8.8 9.0 9.0 PHOS  --  3.5 3.5 No results for input(s): PH, PCO2, PO2, HCO3, PHI, PCO2I, PO2I, HCO3I in the last 72 hours. No results for input(s): LCAD, LAC in the last 72 hours. 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 
ICD-9-CM: 518.81  10/23/2018 Yes Hypoproteinemia ([facility] ICD-10-CM: E77.8 ICD-9-CM: 273.8  10/17/2018 Unknown Pleural effusion ICD-10-CM: J90 ICD-9-CM: 511.9  10/16/2018 Unknown Overview Signed 10/24/2018  9:03 AM by Sydnee Gilford, MD  
  Loculated on the right Hypomagnesemia ICD-10-CM: U45.00 
ICD-9-CM: 275.2  10/16/2018 Unknown Hypokalemia ICD-10-CM: E87.6 ICD-9-CM: 276.8  10/16/2018 Unknown Sepsis ([facility] ICD-10-CM: A41.9 ICD-9-CM: 038.9, 995.91  10/14/2018 Unknown MSSA bacteremia ICD-10-CM: R78.81 ICD-9-CM: 790.7, 041.11  10/14/2018 Unknown Septic arthritis ([facility] ICD-10-CM: M00.9 ICD-9-CM: 711.00  10/14/2018 Unknown Prolonged Q-T interval on ECG ICD-10-CM: R94.31 
ICD-9-CM: 794.31  10/14/2018 Unknown Transaminitis ICD-10-CM: R74.0 ICD-9-CM: 790.4  10/14/2018 Unknown Hyponatremia ICD-10-CM: E87.1 ICD-9-CM: 276.1  10/14/2018 Unknown Systolic CHF, acute on chronic (HCC) ICD-10-CM: K05.94 ICD-9-CM: 428.23, 428.0  8/30/2018 Yes Heroin abuse ([facility] ICD-10-CM: F11.10 ICD-9-CM: 305.50  4/10/2018 Yes Plan:  (Medical Decision Making) 1   Thoracentesis if we see window on ultrasound 2   May need chest tube or vats -- 
 
More than 50% of the time documented was spent in face-to-face contact with the patient and in the care of the patient on the floor/unit where the patient is located.  
 
Juan M Arcso MD

## 2018-10-26 NOTE — PROGRESS NOTES
Interventional Radiology Post Thoracentesis Note 
10/26/2018 Procedure(s): Ultrasound guided Diagnostic Thoracentesis Performed with 8 Khmer catheter total volume 229 ml. Samples sent to lab. Preliminary Findings: small brown, clear. Complications: None Plan:  Observation, check labs if drawn. Chest X-Ray:  yes Full dictated report to follow Signed By: Fahad Hooks RN

## 2018-10-26 NOTE — PROGRESS NOTES
Shift assessment complete. A&OX4. Respirations present. Even and unlabored. Lung sounds have crackles bilaterally. Apical HR is regular. Abdomen is soft and non-tender. Bowel sounds active bilaterally. No s/s of distress. Zero c/o pain at this time. Call light within reach. Encouraged patient to call for assistance. Patient verbalizes understanding. Sitter at bedside and boyfriend is at the bedside. See Doc Flowsheet for assessment details. Patient resting in bed. Door open for observation. Will continue to monitor for safety and for any changes.

## 2018-10-26 NOTE — PROGRESS NOTES
Patient received from [facility] post procedure. Patient stable. Reassessment completed. Currently resting in bed with family at bedside.

## 2018-10-27 LAB
ANION GAP SERPL CALC-SCNC: 6 MMOL/L
BASOPHILS # BLD: 0 K/UL (ref 0–0.2)
BASOPHILS NFR BLD: 0 % (ref 0–2)
BUN SERPL-MCNC: 22 MG/DL (ref 6–23)
CALCIUM SERPL-MCNC: 8.6 MG/DL (ref 8.3–10.4)
CHLORIDE SERPL-SCNC: 93 MMOL/L (ref 98–107)
CO2 SERPL-SCNC: 37 MMOL/L (ref 21–32)
CREAT SERPL-MCNC: 1.25 MG/DL (ref 0.6–1)
DIFFERENTIAL METHOD BLD: ABNORMAL
EOSINOPHIL # BLD: 0.1 K/UL (ref 0–0.8)
EOSINOPHIL NFR BLD: 2 % (ref 0.5–7.8)
ERYTHROCYTE [DISTWIDTH] IN BLOOD BY AUTOMATED COUNT: 23.5 %
GLUCOSE SERPL-MCNC: 77 MG/DL (ref 65–100)
HCT VFR BLD AUTO: 34.6 % (ref 35.8–46.3)
HGB BLD-MCNC: 9.9 G/DL (ref 11.7–15.4)
IMM GRANULOCYTES # BLD: 0 K/UL (ref 0–0.5)
IMM GRANULOCYTES NFR BLD AUTO: 1 % (ref 0–5)
LYMPHOCYTES # BLD: 2.4 K/UL (ref 0.5–4.6)
LYMPHOCYTES NFR BLD: 35 % (ref 13–44)
MCH RBC QN AUTO: 26.4 PG (ref 26.1–32.9)
MCHC RBC AUTO-ENTMCNC: 28.6 G/DL (ref 31.4–35)
MCV RBC AUTO: 92.3 FL (ref 79.6–97.8)
MONOCYTES # BLD: 0.7 K/UL (ref 0.1–1.3)
MONOCYTES NFR BLD: 10 % (ref 4–12)
NEUTS SEG # BLD: 3.7 K/UL (ref 1.7–8.2)
NEUTS SEG NFR BLD: 52 % (ref 43–78)
NRBC # BLD: 0.02 K/UL (ref 0–0.2)
PLATELET # BLD AUTO: 395 K/UL (ref 150–450)
PMV BLD AUTO: 9.1 FL (ref 9.4–12.3)
POTASSIUM SERPL-SCNC: 4.6 MMOL/L (ref 3.5–5.1)
RBC # BLD AUTO: 3.75 M/UL (ref 4.05–5.2)
SODIUM SERPL-SCNC: 136 MMOL/L (ref 136–145)
WBC # BLD AUTO: 7.1 K/UL (ref 4.3–11.1)

## 2018-10-27 PROCEDURE — 94640 AIRWAY INHALATION TREATMENT: CPT

## 2018-10-27 PROCEDURE — 36415 COLL VENOUS BLD VENIPUNCTURE: CPT

## 2018-10-27 PROCEDURE — 77010033678 HC OXYGEN DAILY

## 2018-10-27 PROCEDURE — 74011250636 HC RX REV CODE- 250/636: Performed by: INTERNAL MEDICINE

## 2018-10-27 PROCEDURE — 80048 BASIC METABOLIC PNL TOTAL CA: CPT

## 2018-10-27 PROCEDURE — 85025 COMPLETE CBC W/AUTO DIFF WBC: CPT

## 2018-10-27 PROCEDURE — 74011000250 HC RX REV CODE- 250: Performed by: INTERNAL MEDICINE

## 2018-10-27 PROCEDURE — 65270000029 HC RM PRIVATE

## 2018-10-27 PROCEDURE — 74011250637 HC RX REV CODE- 250/637: Performed by: INTERNAL MEDICINE

## 2018-10-27 PROCEDURE — 94760 N-INVAS EAR/PLS OXIMETRY 1: CPT

## 2018-10-27 RX ORDER — SPIRONOLACTONE 25 MG/1
50 TABLET ORAL 2 TIMES DAILY
Status: DISCONTINUED | OUTPATIENT
Start: 2018-10-27 | End: 2018-10-27 | Stop reason: SDUPTHER

## 2018-10-27 RX ORDER — LISINOPRIL 5 MG/1
2.5 TABLET ORAL DAILY
Status: DISCONTINUED | OUTPATIENT
Start: 2018-10-27 | End: 2018-10-27 | Stop reason: SDUPTHER

## 2018-10-27 RX ORDER — SPIRONOLACTONE 25 MG/1
50 TABLET ORAL 2 TIMES DAILY
Status: DISCONTINUED | OUTPATIENT
Start: 2018-10-28 | End: 2018-11-05 | Stop reason: HOSPADM

## 2018-10-27 RX ORDER — LISINOPRIL 5 MG/1
2.5 TABLET ORAL DAILY
Status: DISCONTINUED | OUTPATIENT
Start: 2018-10-28 | End: 2018-11-05 | Stop reason: HOSPADM

## 2018-10-27 RX ADMIN — ONDANSETRON HYDROCHLORIDE 4 MG: 2 INJECTION INTRAMUSCULAR; INTRAVENOUS at 01:31

## 2018-10-27 RX ADMIN — SENNOSIDES AND DOCUSATE SODIUM 2 TABLET: 8.6; 5 TABLET ORAL at 21:30

## 2018-10-27 RX ADMIN — POTASSIUM CHLORIDE 20 MEQ: 20 TABLET, EXTENDED RELEASE ORAL at 08:54

## 2018-10-27 RX ADMIN — METOPROLOL SUCCINATE 25 MG: 25 TABLET, EXTENDED RELEASE ORAL at 18:12

## 2018-10-27 RX ADMIN — ALBUTEROL SULFATE 2.5 MG: 2.5 SOLUTION RESPIRATORY (INHALATION) at 21:38

## 2018-10-27 RX ADMIN — Medication 2 G: at 18:13

## 2018-10-27 RX ADMIN — Medication 10 ML: at 18:13

## 2018-10-27 RX ADMIN — ONDANSETRON HYDROCHLORIDE 4 MG: 2 INJECTION INTRAMUSCULAR; INTRAVENOUS at 18:12

## 2018-10-27 RX ADMIN — Medication 10 ML: at 05:58

## 2018-10-27 RX ADMIN — Medication 2 G: at 01:26

## 2018-10-27 RX ADMIN — Medication 400 MG: at 08:55

## 2018-10-27 RX ADMIN — Medication 2 G: at 08:54

## 2018-10-27 RX ADMIN — HEPARIN SODIUM 5000 UNITS: 5000 INJECTION INTRAVENOUS; SUBCUTANEOUS at 18:12

## 2018-10-27 RX ADMIN — METHADONE HYDROCHLORIDE 30 MG: 10 TABLET ORAL at 18:11

## 2018-10-27 RX ADMIN — Medication 400 MG: at 21:30

## 2018-10-27 RX ADMIN — ALBUTEROL SULFATE 2.5 MG: 2.5 SOLUTION RESPIRATORY (INHALATION) at 12:10

## 2018-10-27 RX ADMIN — HEPARIN SODIUM 5000 UNITS: 5000 INJECTION INTRAVENOUS; SUBCUTANEOUS at 05:57

## 2018-10-27 RX ADMIN — METHADONE HYDROCHLORIDE 30 MG: 10 TABLET ORAL at 08:54

## 2018-10-27 RX ADMIN — ONDANSETRON HYDROCHLORIDE 4 MG: 2 INJECTION INTRAMUSCULAR; INTRAVENOUS at 08:54

## 2018-10-27 RX ADMIN — FERROUS SULFATE TAB 325 MG (65 MG ELEMENTAL FE) 325 MG: 325 (65 FE) TAB at 11:59

## 2018-10-27 RX ADMIN — Medication 400 MG: at 18:12

## 2018-10-27 RX ADMIN — Medication 10 ML: at 21:30

## 2018-10-27 RX ADMIN — PANTOPRAZOLE SODIUM 40 MG: 40 TABLET, DELAYED RELEASE ORAL at 08:55

## 2018-10-27 RX ADMIN — METOPROLOL SUCCINATE 25 MG: 25 TABLET, EXTENDED RELEASE ORAL at 09:02

## 2018-10-27 RX ADMIN — ALBUTEROL SULFATE 2.5 MG: 2.5 SOLUTION RESPIRATORY (INHALATION) at 08:26

## 2018-10-27 RX ADMIN — FERROUS SULFATE TAB 325 MG (65 MG ELEMENTAL FE) 325 MG: 325 (65 FE) TAB at 18:11

## 2018-10-27 RX ADMIN — TORSEMIDE 20 MG: 20 TABLET ORAL at 18:12

## 2018-10-27 RX ADMIN — TORSEMIDE 20 MG: 20 TABLET ORAL at 08:55

## 2018-10-27 RX ADMIN — FERROUS SULFATE TAB 325 MG (65 MG ELEMENTAL FE) 325 MG: 325 (65 FE) TAB at 08:55

## 2018-10-27 NOTE — PROGRESS NOTES
Hospitalist Progress Note Patient: Gaby Ashford MRN: [id number]  SSN: [id number] YOB: 1985  Age: 28 y.o. Sex: female Admit Date: 10/14/2018 LOS: 13 days Subjective:  
 
From previous notes: \"29 year old female with history of CHF EF 10% diagnosed 4/18, IVDU heroin, presented to the ED with worsening shortness of breath pedal edema, with on going heroin use. She had signed out of [facility]  9/28 Where she was being treated for MSSA bacteremia/endocarditis, (with suspicion of heroin use via her picc line) with end date at that time 10/14. Since then she was off her CHF medications. And had not received any antibiotics She presented with pain in hip, worsening shortness of breath, and decompensated CHF. She has been started on cefazolin here by ID with EOT 11/5. We have diuresed her 10L, and currently is on spironolactone and torsemide with on going good diuresis. \" 
 
10/26 - The patient had a thoracentesis this morning. Does not feel any different. Denies CP/SOB. Complains of being anxious about possible chest tube. 10/27 - She feels good this morning. Denies CP/SOB. Still with BLE edema. Had some hypotension overnight but asymptomatic. Review of systems negative except stated above. Objective:  
 
Visit Vitals BP 99/63 Pulse 91 Temp 97.7 °F (36.5 °C) Resp 14 Ht 6' (1.829 m) Wt 79.9 kg (176 lb 3.2 oz) SpO2 98% Breastfeeding? No  
BMI 23.90 kg/m² Oxygen Therapy O2 Sat (%): 98 % (10/27/18 0826) Pulse via Oximetry: 94 beats per minute (10/27/18 0826) O2 Device: Nasal cannula (10/27/18 0826) O2 Flow Rate (L/min): 1 l/min (10/27/18 0826) FIO2 (%): 24 % (10/26/18 2101) Intake and Output:  
 
Intake/Output Summary (Last 24 hours) at 10/27/2018 9708 Last data filed at 10/26/2018 2000 Gross per 24 hour Intake 720 ml Output 1129 ml Net -409 ml Physical Exam:  
GENERAL: alert, cooperative, no distress, appears stated age EYE: conjunctivae/corneas clear. PERRL. THROAT & NECK: normal and no erythema or exudates noted. LUNG: clear to auscultation bilaterally HEART: regular rate and rhythm, S1S2, no murmur, no JVD ABDOMEN: soft, non-tender, non-distended. Bowel sounds normal.  
EXTREMITIES:  2+ BLE edema, 2+ pedal/radial pulses bilaterally SKIN: no rash or abnormalities NEUROLOGIC: A&Ox3. Cranial nerves 2-12 grossly intact. Lab/Data Review: 
Recent Results (from the past 24 hour(s)) CBC WITH AUTOMATED DIFF Collection Time: 10/27/18  6:28 AM  
Result Value Ref Range WBC 7.1 4.3 - 11.1 K/uL  
 RBC 3.75 (L) 4.05 - 5.2 M/uL HGB 9.9 (L) 11.7 - 15.4 g/dL HCT 34.6 (L) 35.8 - 46.3 % MCV 92.3 79.6 - 97.8 FL  
 MCH 26.4 26.1 - 32.9 PG  
 MCHC 28.6 (L) 31.4 - 35.0 g/dL RDW 23.5 % PLATELET 912 832 - 596 K/uL MPV 9.1 (L) 9.4 - 12.3 FL ABSOLUTE NRBC 0.02 0.0 - 0.2 K/uL  
 DF AUTOMATED NEUTROPHILS 52 43 - 78 % LYMPHOCYTES 35 13 - 44 % MONOCYTES 10 4.0 - 12.0 % EOSINOPHILS 2 0.5 - 7.8 % BASOPHILS 0 0.0 - 2.0 % IMMATURE GRANULOCYTES 1 0.0 - 5.0 %  
 ABS. NEUTROPHILS 3.7 1.7 - 8.2 K/UL  
 ABS. LYMPHOCYTES 2.4 0.5 - 4.6 K/UL  
 ABS. MONOCYTES 0.7 0.1 - 1.3 K/UL  
 ABS. EOSINOPHILS 0.1 0.0 - 0.8 K/UL  
 ABS. BASOPHILS 0.0 0.0 - 0.2 K/UL  
 ABS. IMM. GRANS. 0.0 0.0 - 0.5 K/UL METABOLIC PANEL, BASIC Collection Time: 10/27/18  6:28 AM  
Result Value Ref Range Sodium 136 136 - 145 mmol/L Potassium 4.6 3.5 - 5.1 mmol/L Chloride 93 (L) 98 - 107 mmol/L  
 CO2 37 (H) 21 - 32 mmol/L Anion gap 6 mmol/L Glucose 77 65 - 100 mg/dL BUN 22 6 - 23 MG/DL Creatinine 1.25 (H) 0.6 - 1.0 MG/DL  
 GFR est AA >60 >60 ml/min/1.73m2 GFR est non-AA 52 ml/min/1.73m2 Calcium 8.6 8.3 - 10.4 MG/DL Imaging: Xr Chest Sngl V Result Date: 10/26/2018 IMPRESSION: No pneumothorax seen. Decrease in the fluid on the right, please see above comments. Xr Chest Pa Lat Result Date: 10/26/2018 Impression: Stable two-view chest.  
 
Ir Thoracentesis/insert Chest Tube Result Date: 10/26/2018 IMPRESSION: 3 separate loculations were aspirated and fluid from the first was sent for cytology. PA chest x-ray obtained afterwards demonstrates smaller lateral loculation of fluid. In the deep inferior thorax that may be residual fluid possibly amenable to thoracentesis at another time. Cytology pending. No results found for this visit on 10/14/18. Cultures: All Micro Results Procedure Component Value Units Date/Time CULTURE, BODY FLUID Kandice Chauhan [[id number]] Collected:  10/26/18 0830 Order Status:  Completed Specimen:  Pleural Fluid Updated:  10/26/18 1335 Special Requests: NO SPECIAL REQUESTS     
  GRAM STAIN NO WBCS SEEN     
   NO DEFINITE ORGANISM SEEN Culture result: PENDING  
 CULTURE, BLOOD [[id number]] Collected:  10/17/18 1321 Order Status:  Completed Specimen:  Blood Updated:  10/22/18 0815 Special Requests: --     
  RIGHT 
HAND Culture result: NO GROWTH 5 DAYS     
 CULTURE, BLOOD [[id number]] Collected:  10/17/18 1319 Order Status:  Completed Specimen:  Blood Updated:  10/22/18 0815 Special Requests: --     
  RIGHT 
HAND Culture result: NO GROWTH 5 DAYS     
 CULTURE, BLOOD [[id number]] Collected:  10/15/18 2233 Order Status:  Completed Specimen:  Blood Updated:  10/20/18 1150 Special Requests: RIGHT ANTECUBITAL Culture result: NO GROWTH 5 DAYS     
 CULTURE, BLOOD [[id number]] Collected:  10/15/18 2235 Order Status:  Completed Specimen:  Blood Updated:  10/20/18 1150 Special Requests: RIGHT ARM Culture result: NO GROWTH 5 DAYS FUNGUS CULTURE AND SMEAR [[id number]] Collected:  10/16/18 8804 Order Status:  Completed Specimen:  Other from Miscellaneous sample Updated:  10/19/18 1138 Source THORACENTESIS Comment: RIGHT Corrected on 10/16 AT 0951: This is a correction to the medical record of the test results previously reported as THORACENTESIS Fungus stain Direct Inoculation Fungus (Mycology) Culture Other source received Comment: (NOTE) Performed At: [address] [id number] Aline Dumont MD [id number] CULTURE, BLOOD [[id number]] Collected:  10/14/18 1250 Order Status:  Completed Specimen:  Whole Blood Updated:  10/19/18 0809 Special Requests: --     
  RIGHT 
HAND Culture result: NO GROWTH 5 DAYS     
 CULTURE, BLOOD [[id number]] Collected:  10/14/18 1251 Order Status:  Completed Specimen:  Whole Blood Updated:  10/19/18 0809 Special Requests: --     
  HAND 
LEFT Culture result: NO GROWTH 5 DAYS     
 CULTURE, BODY FLUID W Joana Essex [[id number]] Collected:  10/16/18 9558 Order Status:  Completed Specimen:  Thoracentesis Updated:  10/18/18 7090 Special Requests: NO SPECIAL REQUESTS     
  GRAM STAIN 0 TO 10 WBC'S/OIF  
   NO DEFINITE ORGANISM SEEN Culture result: NO GROWTH 2 DAYS     
 AFB CULTURE + SMEAR W/RFLX ID FROM CULTURE [[id number]] Collected:  10/16/18 0951 Order Status:  Completed Specimen:  Miscellaneous sample Updated:  10/17/18 1736 Source THORACENTESIS Comment: RIGHT Corrected on 10/16 AT 0951: This is a correction to the medical record of the test results previously reported as THORACENTESIS 
  
  
  AFB Specimen processing Concentration Acid Fast Smear NEGATIVE Comment: (NOTE) Performed At: [address] [id number] Aline Dumont MD WY:[id number] Acid Fast Culture PENDING  
 CULTURE, BLOOD [[id number]] Collected:  10/14/18 1300 Order Status:  Canceled Specimen:  Whole Blood Assessment/Plan:  
 
Principal Problem: 
  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 
- Continue Cefazolin - EOT 11/5/18 Active Problems: MSSA bacteremia (10/14/2018) - Due to IVDU 
- Continue Cefazolin - EOT 11/5/18 Sepsis ([address] (10/14/2018) - Due to #1 
- Resolved with abx Septic arthritis ([address] (10/14/2018) - Improving 
- Continue Cefazolin Systolic CHF, acute on chronic ([address] (8/30/2018) - EF 10% 
- Continue Demadex 20mg BID 
- Continue Spironolactone 50mg BID (holding for low BP this AM) - Continue Toprol BID 
- Continue Lisinopril (holding for low BP this AM) Acute respiratory failure ([address] (10/23/2018) - Improving with diuresis - S/P thoracentesis by pulm and IR on 10/27/18 
- Continue oxygen - Pulmonary following Prolonged Q-T interval on ECG (10/14/2018) Hyponatremia (10/14/2018) - Resolved Pleural effusion (10/16/2018) - S/P thoracentesis by pulm and IR on 10/27/18 - Pulmonary following Hypomagnesemia (10/16/2018) - Mag 2.2 
- Continue to follow Hypokalemia (10/16/2018) - Resolved Heroin abuse ([address] (4/10/2018) - Continue Methadone 
- CANNOT leave with PICC line Transaminitis (10/14/2018) - Resolved Hypoproteinemia ([address] (10/17/2018) - Due to drug use and lack of appetite Dispo - Continue Cefazolin EOT 11/5/18. Had thoracentesis by IR on 10/26/18. DIET NUTRITIONAL SUPPLEMENTS All Meals; Ensure [employer] DIET REGULAR 2 GM NA (House Low NA) DVT Prophylaxis: Heparin Signed By: Leah Mcclelland DO October 27, 2018

## 2018-10-27 NOTE — PROGRESS NOTES
Shift assessment complete. A&O x 4. Denies any needs at this time. No c/o pain. No distress noted. Respirations even and unlabored. Sitter at bedside. Call light within reach.

## 2018-10-27 NOTE — PROGRESS NOTES
Problem: Falls - Risk of 
Goal: *Absence of Falls Document Howard Norton Fall Risk and appropriate interventions in the flowsheet. Outcome: Progressing Towards Goal 
Fall Risk Interventions: 
Mobility Interventions: Patient to call before getting OOB Medication Interventions: Teach patient to arise slowly Elimination Interventions: Patient to call for help with toileting needs

## 2018-10-27 NOTE — PROGRESS NOTES
A.M assessment complete; pt in bed playing a game on her phone. Alert & oriented. Resp even & unlabored on 2L NC; lungs diminished. HR regular. Abdomen soft with active bowel sounds. Bilat LE with 2+ pitting edema. No needs voiced; bed low & locked; call light in reach; will continue to monitor.

## 2018-10-28 LAB
BACTERIA SPEC CULT: NORMAL
GRAM STN SPEC: NORMAL
GRAM STN SPEC: NORMAL
SERVICE CMNT-IMP: NORMAL

## 2018-10-28 PROCEDURE — 74011250637 HC RX REV CODE- 250/637: Performed by: INTERNAL MEDICINE

## 2018-10-28 PROCEDURE — 65270000029 HC RM PRIVATE

## 2018-10-28 PROCEDURE — 94640 AIRWAY INHALATION TREATMENT: CPT

## 2018-10-28 PROCEDURE — 74011250636 HC RX REV CODE- 250/636: Performed by: INTERNAL MEDICINE

## 2018-10-28 PROCEDURE — 94760 N-INVAS EAR/PLS OXIMETRY 1: CPT

## 2018-10-28 PROCEDURE — 77010033678 HC OXYGEN DAILY

## 2018-10-28 PROCEDURE — 74011000250 HC RX REV CODE- 250: Performed by: INTERNAL MEDICINE

## 2018-10-28 RX ORDER — METOPROLOL SUCCINATE 25 MG/1
25 TABLET, EXTENDED RELEASE ORAL EVERY EVENING
Status: DISCONTINUED | OUTPATIENT
Start: 2018-10-29 | End: 2018-11-05 | Stop reason: HOSPADM

## 2018-10-28 RX ADMIN — ALBUTEROL SULFATE 2.5 MG: 2.5 SOLUTION RESPIRATORY (INHALATION) at 16:19

## 2018-10-28 RX ADMIN — ACETAMINOPHEN 650 MG: 325 TABLET, FILM COATED ORAL at 13:33

## 2018-10-28 RX ADMIN — ALBUTEROL SULFATE 2.5 MG: 2.5 SOLUTION RESPIRATORY (INHALATION) at 21:07

## 2018-10-28 RX ADMIN — Medication 10 ML: at 22:30

## 2018-10-28 RX ADMIN — FERROUS SULFATE TAB 325 MG (65 MG ELEMENTAL FE) 325 MG: 325 (65 FE) TAB at 12:09

## 2018-10-28 RX ADMIN — METHADONE HYDROCHLORIDE 30 MG: 10 TABLET ORAL at 17:13

## 2018-10-28 RX ADMIN — Medication 5 ML: at 17:13

## 2018-10-28 RX ADMIN — ONDANSETRON HYDROCHLORIDE 4 MG: 2 INJECTION INTRAMUSCULAR; INTRAVENOUS at 01:00

## 2018-10-28 RX ADMIN — Medication 10 ML: at 05:22

## 2018-10-28 RX ADMIN — Medication 400 MG: at 17:13

## 2018-10-28 RX ADMIN — Medication 2 G: at 01:01

## 2018-10-28 RX ADMIN — PANTOPRAZOLE SODIUM 40 MG: 40 TABLET, DELAYED RELEASE ORAL at 08:25

## 2018-10-28 RX ADMIN — POTASSIUM CHLORIDE 20 MEQ: 20 TABLET, EXTENDED RELEASE ORAL at 08:26

## 2018-10-28 RX ADMIN — ONDANSETRON HYDROCHLORIDE 4 MG: 2 INJECTION INTRAMUSCULAR; INTRAVENOUS at 08:25

## 2018-10-28 RX ADMIN — TORSEMIDE 20 MG: 20 TABLET ORAL at 08:26

## 2018-10-28 RX ADMIN — TORSEMIDE 20 MG: 20 TABLET ORAL at 17:13

## 2018-10-28 RX ADMIN — METHADONE HYDROCHLORIDE 30 MG: 10 TABLET ORAL at 08:25

## 2018-10-28 RX ADMIN — SENNOSIDES AND DOCUSATE SODIUM 2 TABLET: 8.6; 5 TABLET ORAL at 22:30

## 2018-10-28 RX ADMIN — HEPARIN SODIUM 5000 UNITS: 5000 INJECTION INTRAVENOUS; SUBCUTANEOUS at 05:25

## 2018-10-28 RX ADMIN — ONDANSETRON HYDROCHLORIDE 4 MG: 2 INJECTION INTRAMUSCULAR; INTRAVENOUS at 17:13

## 2018-10-28 RX ADMIN — FERROUS SULFATE TAB 325 MG (65 MG ELEMENTAL FE) 325 MG: 325 (65 FE) TAB at 17:13

## 2018-10-28 RX ADMIN — Medication 2 G: at 08:25

## 2018-10-28 RX ADMIN — Medication 400 MG: at 08:26

## 2018-10-28 RX ADMIN — FERROUS SULFATE TAB 325 MG (65 MG ELEMENTAL FE) 325 MG: 325 (65 FE) TAB at 08:25

## 2018-10-28 RX ADMIN — Medication 400 MG: at 22:29

## 2018-10-28 RX ADMIN — METOPROLOL SUCCINATE 25 MG: 25 TABLET, EXTENDED RELEASE ORAL at 08:49

## 2018-10-28 RX ADMIN — HEPARIN SODIUM 5000 UNITS: 5000 INJECTION INTRAVENOUS; SUBCUTANEOUS at 17:12

## 2018-10-28 RX ADMIN — ALBUTEROL SULFATE 2.5 MG: 2.5 SOLUTION RESPIRATORY (INHALATION) at 07:49

## 2018-10-28 RX ADMIN — Medication 2 G: at 17:12

## 2018-10-28 NOTE — PROGRESS NOTES
A.M assessment complete; pt in the chair at bedside. Alert & oriented. Resp even & unlabored on 2L NC; lungs diminished. HR regular. Abdomen soft with active bowel sounds. Bilat LE with 2+ pitting edema. No c/o pain. Call light in reach; will continue to monitor.

## 2018-10-28 NOTE — PROGRESS NOTES
Shift assessment complete. A&O x 4. Denies any needs at this time. No c/o pain. No distress noted. Respirations even and unlabored. Edema to bilateral lower extremities. Sitter at bedside. Call light within reach.

## 2018-10-28 NOTE — PROGRESS NOTES
Hospitalist Progress Note Patient: Dre Camp MRN: [id number]  SSN: [id number] YOB: 1985  Age: 28 y.o. Sex: female Admit Date: 10/14/2018 LOS: 14 days Subjective:  
 
From previous notes: \"29 year old female with history of CHF EF 10% diagnosed 4/18, IVDU heroin, presented to the ED with worsening shortness of breath pedal edema, with on going heroin use. She had signed out of [facility]  9/28 Where she was being treated for MSSA bacteremia/endocarditis, (with suspicion of heroin use via her picc line) with end date at that time 10/14. Since then she was off her CHF medications. And had not received any antibiotics She presented with pain in hip, worsening shortness of breath, and decompensated CHF. She has been started on cefazolin here by ID with EOT 11/5. We have diuresed her 10L, and currently is on spironolactone and torsemide with on going good diuresis. \" 
 
10/27 - She feels good this morning. Denies CP/SOB. Still with BLE edema. Had some hypotension overnight but asymptomatic. 
10/28 - She states \"my feet look like a normal human's today\". Denies CP/SOB. Starla F/C/N/V. Review of systems negative except stated above. Objective:  
 
Visit Vitals /75 Pulse 95 Temp 97.3 °F (36.3 °C) Resp 18 Ht 6' (1.829 m) Wt 80.6 kg (177 lb 9.6 oz) SpO2 96% Breastfeeding? No  
BMI 24.09 kg/m² Oxygen Therapy O2 Sat (%): 96 % (10/28/18 0822) Pulse via Oximetry: 80 beats per minute (10/28/18 0750) O2 Device: Nasal cannula (10/28/18 0750) O2 Flow Rate (L/min): 1 l/min (10/28/18 0750) FIO2 (%): 24 % (10/27/18 1930) Intake and Output:  
 
Intake/Output Summary (Last 24 hours) at 10/28/2018 0282 Last data filed at 10/28/2018 0500 Gross per 24 hour Intake  Output 8650 ml Net -8650 ml Physical Exam:  
GENERAL: alert, cooperative, no distress, appears stated age EYE: conjunctivae/corneas clear. PERRL. THROAT & NECK: normal and no erythema or exudates noted. LUNG: clear to auscultation bilaterally HEART: regular rate and rhythm, S1S2, no murmur, no JVD ABDOMEN: soft, non-tender, non-distended. Bowel sounds normal.  
EXTREMITIES:  1+ BLE edema, 2+ pedal/radial pulses bilaterally SKIN: no rash or abnormalities NEUROLOGIC: A&Ox3. Cranial nerves 2-12 grossly intact. Lab/Data Review: No results found for this or any previous visit (from the past 24 hour(s)). Imaging: Xr Chest Sngl V Result Date: 10/26/2018 IMPRESSION: No pneumothorax seen. Decrease in the fluid on the right, please see above comments. Xr Chest Pa Lat Result Date: 10/26/2018 Impression: Stable two-view chest.  
 
Ir Thoracentesis/insert Chest Tube Result Date: 10/26/2018 IMPRESSION: 3 separate loculations were aspirated and fluid from the first was sent for cytology. PA chest x-ray obtained afterwards demonstrates smaller lateral loculation of fluid. In the deep inferior thorax that may be residual fluid possibly amenable to thoracentesis at another time. Cytology pending. No results found for this visit on 10/14/18. Cultures: All Micro Results Procedure Component Value Units Date/Time CULTURE, BODY FLUID Taylor Sol [[id number]] Collected:  10/26/18 0830 Order Status:  Completed Specimen:  Pleural Fluid Updated:  10/28/18 9944 Special Requests: NO SPECIAL REQUESTS     
  GRAM STAIN NO WBCS SEEN     
   NO DEFINITE ORGANISM SEEN Culture result: NO GROWTH 2 DAYS     
 CULTURE, BLOOD [[id number]] Collected:  10/17/18 1321 Order Status:  Completed Specimen:  Blood Updated:  10/22/18 0815 Special Requests: --     
  RIGHT 
HAND Culture result: NO GROWTH 5 DAYS     
 CULTURE, BLOOD [[id number]] Collected:  10/17/18 1319 Order Status:  Completed Specimen:  Blood Updated:  10/22/18 0815 Special Requests: --     
  RIGHT 
HAND Culture result: NO GROWTH 5 DAYS CULTURE, BLOOD [[id number]] Collected:  10/15/18 2233 Order Status:  Completed Specimen:  Blood Updated:  10/20/18 1150 Special Requests: RIGHT ANTECUBITAL Culture result: NO GROWTH 5 DAYS     
 CULTURE, BLOOD [[id number]] Collected:  10/15/18 2235 Order Status:  Completed Specimen:  Blood Updated:  10/20/18 1150 Special Requests: RIGHT ARM Culture result: NO GROWTH 5 DAYS FUNGUS CULTURE AND SMEAR [[id number]] Collected:  10/16/18 8672 Order Status:  Completed Specimen:  Other from Miscellaneous sample Updated:  10/19/18 1138 Source THORACENTESIS Comment: RIGHT Corrected on 10/16 AT 0951: This is a correction to the medical record of the test results previously reported as THORACENTESIS Fungus stain Direct Inoculation Fungus (Mycology) Culture Other source received Comment: (NOTE) Performed At: [address] [id number] Patria Rubalcava MD [id number] CULTURE, BLOOD [[id number]] Collected:  10/14/18 1250 Order Status:  Completed Specimen:  Whole Blood Updated:  10/19/18 0809 Special Requests: --     
  RIGHT 
HAND Culture result: NO GROWTH 5 DAYS     
 CULTURE, BLOOD [[id number]] Collected:  10/14/18 1251 Order Status:  Completed Specimen:  Whole Blood Updated:  10/19/18 0809 Special Requests: --     
  HAND 
LEFT Culture result: NO GROWTH 5 DAYS     
 CULTURE, BODY FLUID W Zoe Handing [[id number]] Collected:  10/16/18 2470 Order Status:  Completed Specimen:  Thoracentesis Updated:  10/18/18 1956 Special Requests: NO SPECIAL REQUESTS     
  GRAM STAIN 0 TO 10 WBC'S/OIF  
   NO DEFINITE ORGANISM SEEN Culture result: NO GROWTH 2 DAYS     
 AFB CULTURE + SMEAR W/RFLX ID FROM CULTURE [[id number]] Collected:  10/16/18 0951 Order Status:  Completed Specimen:  Miscellaneous sample Updated:  10/17/18 1736 Source THORACENTESIS Comment: RIGHT Corrected on 10/16 AT 0951: This is a correction to the medical record of the test results previously reported as THORACENTESIS 
  
  
  AFB Specimen processing Concentration Acid Fast Smear NEGATIVE Comment: (NOTE) Performed At: [address] [id number] Odalys Garcia MD [id number] Acid Fast Culture PENDING  
 CULTURE, BLOOD [[id number]] Collected:  10/14/18 1300 Order Status:  Canceled Specimen:  Whole Blood Assessment/Plan:  
 
Principal Problem: 
  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 
- Continue Cefazolin - EOT 11/5/18 Active Problems: MSSA bacteremia (10/14/2018) - Due to IVDU 
- Continue Cefazolin - EOT 11/5/18 Sepsis ([address] (10/14/2018) - Due to #1 
- Resolved with abx Septic arthritis ([address] (10/14/2018) - Improving 
- Continue Cefazolin Systolic CHF, acute on chronic ([address] (8/30/2018) - EF 10% 
- Continue Demadex 20mg BID 
- Continue Spironolactone 50mg BID 
- Change Toprol BID to QHS 
- Continue Lisinopril 2.5mg 
 
  Acute respiratory failure ([address] (10/23/2018) - Improving with diuresis - S/P thoracentesis by pulm and IR on 10/27/18 
- Continue oxygen - Pulmonary following Prolonged Q-T interval on ECG (10/14/2018) Hyponatremia (10/14/2018) - Resolved Pleural effusion (10/16/2018) - S/P thoracentesis by pulm and IR on 10/27/18 - Pulmonary following Hypomagnesemia (10/16/2018) - Mag 2.2 
- Continue to follow Hypokalemia (10/16/2018) - Resolved Heroin abuse ([address] (4/10/2018) - Continue Methadone 
- CANNOT leave with PICC line Transaminitis (10/14/2018) - Resolved Hypoproteinemia ([address] (10/17/2018) - Due to drug use and lack of appetite Dispo - Continue Cefazolin EOT 11/5/18. Had thoracentesis by IR on 10/26/18. DIET NUTRITIONAL SUPPLEMENTS All Meals; Ensure [employer] DIET REGULAR 2 GM NA (House Low NA) DVT Prophylaxis: Heparin Signed By: Selestine Cooks, DO October 28, 2018

## 2018-10-28 NOTE — PROGRESS NOTES
Problem: Falls - Risk of 
Goal: *Absence of Falls Document Nick Foster Fall Risk and appropriate interventions in the flowsheet. Outcome: Progressing Towards Goal 
Fall Risk Interventions: 
Mobility Interventions: Patient to call before getting OOB Medication Interventions: Teach patient to arise slowly Elimination Interventions: Patient to call for help with toileting needs

## 2018-10-28 NOTE — PROGRESS NOTES
Ambulated pt without O2. Pt's O2 saturation after walking was 88%. 1L NC placed back on pt. O2 back up to 93%

## 2018-10-29 ENCOUNTER — APPOINTMENT (OUTPATIENT)
Dept: GENERAL RADIOLOGY | Age: 33
DRG: 871 | End: 2018-10-29
Attending: INTERNAL MEDICINE
Payer: SUBSIDIZED

## 2018-10-29 LAB
ANION GAP SERPL CALC-SCNC: 8 MMOL/L
BASOPHILS # BLD: 0.1 K/UL (ref 0–0.2)
BASOPHILS NFR BLD: 1 % (ref 0–2)
BUN SERPL-MCNC: 22 MG/DL (ref 6–23)
CALCIUM SERPL-MCNC: 8.9 MG/DL (ref 8.3–10.4)
CHLORIDE SERPL-SCNC: 89 MMOL/L (ref 98–107)
CO2 SERPL-SCNC: 37 MMOL/L (ref 21–32)
CREAT SERPL-MCNC: 1.29 MG/DL (ref 0.6–1)
DIFFERENTIAL METHOD BLD: ABNORMAL
EOSINOPHIL # BLD: 0.1 K/UL (ref 0–0.8)
EOSINOPHIL NFR BLD: 1 % (ref 0.5–7.8)
ERYTHROCYTE [DISTWIDTH] IN BLOOD BY AUTOMATED COUNT: 22.7 %
GLUCOSE SERPL-MCNC: 124 MG/DL (ref 65–100)
HCT VFR BLD AUTO: 31.6 % (ref 35.8–46.3)
HGB BLD-MCNC: 9 G/DL (ref 11.7–15.4)
IMM GRANULOCYTES # BLD: 0 K/UL (ref 0–0.5)
IMM GRANULOCYTES NFR BLD AUTO: 1 % (ref 0–5)
LYMPHOCYTES # BLD: 1.7 K/UL (ref 0.5–4.6)
LYMPHOCYTES NFR BLD: 29 % (ref 13–44)
MCH RBC QN AUTO: 26.3 PG (ref 26.1–32.9)
MCHC RBC AUTO-ENTMCNC: 28.5 G/DL (ref 31.4–35)
MCV RBC AUTO: 92.4 FL (ref 79.6–97.8)
MONOCYTES # BLD: 0.7 K/UL (ref 0.1–1.3)
MONOCYTES NFR BLD: 12 % (ref 4–12)
NEUTS SEG # BLD: 3.4 K/UL (ref 1.7–8.2)
NEUTS SEG NFR BLD: 57 % (ref 43–78)
NRBC # BLD: 0 K/UL (ref 0–0.2)
PLATELET # BLD AUTO: 340 K/UL (ref 150–450)
PMV BLD AUTO: 8.7 FL (ref 9.4–12.3)
POTASSIUM SERPL-SCNC: 3.6 MMOL/L (ref 3.5–5.1)
RBC # BLD AUTO: 3.42 M/UL (ref 4.05–5.2)
SODIUM SERPL-SCNC: 134 MMOL/L (ref 136–145)
WBC # BLD AUTO: 6 K/UL (ref 4.3–11.1)

## 2018-10-29 PROCEDURE — 71045 X-RAY EXAM CHEST 1 VIEW: CPT

## 2018-10-29 PROCEDURE — 77010033678 HC OXYGEN DAILY

## 2018-10-29 PROCEDURE — 94640 AIRWAY INHALATION TREATMENT: CPT

## 2018-10-29 PROCEDURE — 80048 BASIC METABOLIC PNL TOTAL CA: CPT

## 2018-10-29 PROCEDURE — 65270000029 HC RM PRIVATE

## 2018-10-29 PROCEDURE — 99232 SBSQ HOSP IP/OBS MODERATE 35: CPT | Performed by: INTERNAL MEDICINE

## 2018-10-29 PROCEDURE — 85025 COMPLETE CBC W/AUTO DIFF WBC: CPT

## 2018-10-29 PROCEDURE — 74011000250 HC RX REV CODE- 250: Performed by: INTERNAL MEDICINE

## 2018-10-29 PROCEDURE — 36415 COLL VENOUS BLD VENIPUNCTURE: CPT

## 2018-10-29 PROCEDURE — 74011250637 HC RX REV CODE- 250/637: Performed by: INTERNAL MEDICINE

## 2018-10-29 PROCEDURE — 94760 N-INVAS EAR/PLS OXIMETRY 1: CPT

## 2018-10-29 PROCEDURE — 74011250636 HC RX REV CODE- 250/636: Performed by: INTERNAL MEDICINE

## 2018-10-29 RX ORDER — ALBUTEROL SULFATE 0.83 MG/ML
2.5 SOLUTION RESPIRATORY (INHALATION)
Status: DISCONTINUED | OUTPATIENT
Start: 2018-10-29 | End: 2018-10-30

## 2018-10-29 RX ADMIN — Medication 10 ML: at 05:02

## 2018-10-29 RX ADMIN — TORSEMIDE 20 MG: 20 TABLET ORAL at 10:05

## 2018-10-29 RX ADMIN — Medication 400 MG: at 17:13

## 2018-10-29 RX ADMIN — Medication 10 ML: at 21:20

## 2018-10-29 RX ADMIN — Medication 400 MG: at 10:04

## 2018-10-29 RX ADMIN — Medication 2 G: at 17:12

## 2018-10-29 RX ADMIN — SPIRONOLACTONE 50 MG: 25 TABLET ORAL at 17:13

## 2018-10-29 RX ADMIN — TORSEMIDE 20 MG: 20 TABLET ORAL at 17:13

## 2018-10-29 RX ADMIN — FERROUS SULFATE TAB 325 MG (65 MG ELEMENTAL FE) 325 MG: 325 (65 FE) TAB at 17:13

## 2018-10-29 RX ADMIN — Medication 2 G: at 01:26

## 2018-10-29 RX ADMIN — FERROUS SULFATE TAB 325 MG (65 MG ELEMENTAL FE) 325 MG: 325 (65 FE) TAB at 12:04

## 2018-10-29 RX ADMIN — PANTOPRAZOLE SODIUM 40 MG: 40 TABLET, DELAYED RELEASE ORAL at 10:05

## 2018-10-29 RX ADMIN — Medication 400 MG: at 21:17

## 2018-10-29 RX ADMIN — ONDANSETRON HYDROCHLORIDE 4 MG: 2 INJECTION INTRAMUSCULAR; INTRAVENOUS at 01:26

## 2018-10-29 RX ADMIN — Medication 10 ML: at 14:00

## 2018-10-29 RX ADMIN — HEPARIN SODIUM 5000 UNITS: 5000 INJECTION INTRAVENOUS; SUBCUTANEOUS at 04:58

## 2018-10-29 RX ADMIN — ONDANSETRON HYDROCHLORIDE 4 MG: 2 INJECTION INTRAMUSCULAR; INTRAVENOUS at 17:12

## 2018-10-29 RX ADMIN — POTASSIUM CHLORIDE 20 MEQ: 20 TABLET, EXTENDED RELEASE ORAL at 10:04

## 2018-10-29 RX ADMIN — METOPROLOL SUCCINATE 25 MG: 25 TABLET, EXTENDED RELEASE ORAL at 17:13

## 2018-10-29 RX ADMIN — ALBUTEROL SULFATE 2.5 MG: 2.5 SOLUTION RESPIRATORY (INHALATION) at 07:51

## 2018-10-29 RX ADMIN — METHADONE HYDROCHLORIDE 30 MG: 10 TABLET ORAL at 10:04

## 2018-10-29 RX ADMIN — METHADONE HYDROCHLORIDE 30 MG: 10 TABLET ORAL at 17:13

## 2018-10-29 RX ADMIN — ALBUTEROL SULFATE 2.5 MG: 2.5 SOLUTION RESPIRATORY (INHALATION) at 11:22

## 2018-10-29 RX ADMIN — SENNOSIDES AND DOCUSATE SODIUM 2 TABLET: 8.6; 5 TABLET ORAL at 21:17

## 2018-10-29 RX ADMIN — SPIRONOLACTONE 50 MG: 25 TABLET ORAL at 10:05

## 2018-10-29 RX ADMIN — Medication 2 G: at 10:20

## 2018-10-29 RX ADMIN — ONDANSETRON HYDROCHLORIDE 4 MG: 2 INJECTION INTRAMUSCULAR; INTRAVENOUS at 10:16

## 2018-10-29 RX ADMIN — ALBUTEROL SULFATE 2.5 MG: 2.5 SOLUTION RESPIRATORY (INHALATION) at 20:47

## 2018-10-29 RX ADMIN — HEPARIN SODIUM 5000 UNITS: 5000 INJECTION INTRAVENOUS; SUBCUTANEOUS at 17:12

## 2018-10-29 RX ADMIN — FERROUS SULFATE TAB 325 MG (65 MG ELEMENTAL FE) 325 MG: 325 (65 FE) TAB at 10:05

## 2018-10-29 NOTE — PROGRESS NOTES
Problem: Nutrition Deficit Goal: *Optimize nutritional status Nutrition Follow Up: 
Reason for initial assessment: BPA - Malnutrition Screening Tool positive for unintended weight loss > 33# and eating poorly r/t decreased appetite Consult received 10/16 for General Nutrition Management and Supplement per Dr. Jonas Ascencio.  
  
Assessment:  
Admitted with sepsis, heroin abuse, systolic CHF, MSSA bacteremia. PMH: CHF, IV drug abuse, R sacroiliac arthritis, MSSA/endocarditis. Thoracentesis 10/26. Pt recalls eating majority of am meals, today she had an omelet and orange yogurt. Reports intake of lunch and dinner 25-50% of meals. Recalls \"drinking most of the ensures. \"  Continues to order from [facility]  per preference and indicates portion of compromised intake is related to restricted diet. Current Diet:  Cardiac with Ensure Enlive TID (frequeny increased by 10/26) Pertinent Labs:  Na 134, [id number]; Pt on electrolyte replacement protocols  
  
Anthropometrics:Height: 6' (182.9 cm),  Weight: 88.5 kg (195 lb), unspecified, Body mass index is 26.45 kg/(m^2). BMI class of overweight. Weight 10/16:  89.4 kg-weight source not specified.  Pt with 2+ LE edema bilaterally.   Limited validity of BMI secondary significant edema. Weight 10/18:  86.5 kg-bed source Weight 10/19:  65.8 kg-bed source  Pt with pedal edema 2+ Weight 10/28:  77.5 kg-bed source, Edema 1+ bilateral LE 
NFPE: Mild clavicle muscle loss.   
Malnutrition Criteria: ASPEN Malnutrition Criteria Acute Illness, Chronic Illness, or Social/Enviornmental: Social/environmental illness Weight Loss: 7.5% x 3 month Muscle Mass: Mild Fluid Accumulation: Severe ASPEN Malnutrition Score - Social/Environmental Illness: 8 Social/Environmental Illness - Malnutrition Diagnosis: Moderate malnutrition  
  
Macronutrient needs: Wt used: 73kg EER:  6125-5051 kcal /day (25-30 kcal/kg ideal BW) EPR:  73-88 grams protein/day (1-1.2 grams/kg ideal BW) 
  
 Intake/Comparative Standards:  No recorded intake since 10/16. Based on verbalized intake + 2-3 Ensure Enlive/day, patient potentially meets 100% estimated kcal and protein needs. .  
  
Intervention: 
Meals and snacks: Continue current diet. Reinforced ability to order \"breakfast\" type foods for evening meal if she prefers that type of food. Nutrition supplement therapy:  Continue Ensure Enlive TID Coordination of nutrition care: IDT rounds. Discharge Nutrition Plan: Too soon to determine. [place], [address], [place] [address]

## 2018-10-29 NOTE — PROGRESS NOTES
10/29/18 1124 Oxygen Therapy O2 Sat (%) 98 % Pulse via Oximetry 88 beats per minute O2 Device Nasal cannula O2 Flow Rate (L/min) 1 l/min 
(pt. placed on RA)

## 2018-10-29 NOTE — PROGRESS NOTES
Jean Cody Admission Date: 10/14/2018 Daily Progress Note: 10/29/2018 The patient's chart is reviewed and the patient is discussed with the staff. 
 
33 y.o.  female seen and evaluated at the request of Dr. Alfredo Tyson. She was admitted 10/14 with cc of SOB and LE edema. 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 Patient originally treated for bacteremia/endocarditis at [facility] with nafcillin ending treatment 10/22/18. Patient used IV heroin via her PICC line while at [facility] and left AMA once they discontinued her PICC line. Once discharged, patient had no follow up and has not been taking her CHF medications. Came in with increased SOB and LE edema. Admits to using IV heroin PTA. She was in icu rxed for sepsis.  Cards and ID have seen. Diuresed and on ancef for mssa endocarditis Had thoracentesis for 1000 ml ss fluid on 10/16/18-ldh on fluid was 732. Again on 10/26 with 300cc removed, another 250cc drained by IR same day. Subjective:  
 
Patient sitting up in bed. On 1L O2. Breathing better after recent thoracentesis. No new issues. Current Facility-Administered Medications Medication Dose Route Frequency  metoprolol succinate (TOPROL-XL) XL tablet 25 mg  25 mg Oral QPM  
 spironolactone (ALDACTONE) tablet 50 mg  50 mg Oral BID  lisinopril (PRINIVIL, ZESTRIL) tablet 2.5 mg  2.5 mg Oral DAILY  ceFAZolin (ANCEF) 2 g/20 mL in sterile water IV syringe  2 g IntraVENous Q8H  
 ferrous sulfate tablet 325 mg  1 Tab Oral TID WITH MEALS  
 heparin (porcine) injection 5,000 Units  5,000 Units SubCUTAneous Q12H  
 magnesium oxide (MAG-OX) tablet 400 mg  400 mg Oral TID  methadone (DOLOPHINE) tablet 30 mg  30 mg Oral BID  pantoprazole (PROTONIX) tablet 40 mg  40 mg Oral ACB  polyethylene glycol (MIRALAX) packet 17 g  17 g Oral DAILY  potassium chloride (K-DUR, KLOR-CON) SR tablet 20 mEq  20 mEq Oral DAILY  senna-docusate (PERICOLACE) 8.6-50 mg per tablet 2 Tab  2 Tab Oral QHS  sodium chloride (NS) flush 10 mL  10 mL InterCATHeter Q8H  
 sodium chloride (NS) flush 5-10 mL  5-10 mL IntraVENous Q8H  
 torsemide (DEMADEX) tablet 20 mg  20 mg Oral BID  acetaminophen (TYLENOL) tablet 650 mg  650 mg Oral Q6H PRN  
 lip protectant (BLISTEX) ointment   Topical PRN  
 magnesium hydroxide (MILK OF MAGNESIA) 400 mg/5 mL oral suspension 30 mL  30 mL Oral DAILY PRN  
 ondansetron (ZOFRAN) injection 4 mg  4 mg IntraVENous Q4H PRN  
 sodium chloride (NS) flush 10 mL  10 mL InterCATHeter PRN  
 sodium chloride (NS) flush 5-10 mL  5-10 mL IntraVENous PRN  
 albuterol (PROVENTIL VENTOLIN) nebulizer solution 2.5 mg  2.5 mg Nebulization QID RT Review of Systems Constitutional: negative for fever, chills, sweats Cardiovascular: negative for chest pain, palpitations, syncope, edema Gastrointestinal: negative for dysphagia, reflux, vomiting, diarrhea, abdominal pain, or melena Neurologic: negative for focal weakness, numbness, headache Objective:  
 
Vitals:  
 10/28/18 1919 10/28/18 2339 10/29/18 8458 10/29/18 4548 BP: 112/77 98/60 103/65 111/62 Pulse: 92 84 96 86 Resp: 20 20 20 20 Temp: 97.8 °F (36.6 °C) 97.8 °F (36.6 °C) 98.4 °F (36.9 °C) 97.2 °F (36.2 °C) SpO2: 96% 90% 98% 98% Weight:      
Height:      
 
Intake and Output:  
10/27 1901 - 10/29 0700 In: -  
Out: Shaylee Ocampo No intake/output data recorded. Physical Exam:  
Constitution:  the patient is well developed and in no acute distress EENMT:  Sclera clear, pupils equal, oral mucosa moist 
Respiratory: Crackles on left. Cardiovascular:  RRR without M,G,R 
Gastrointestinal: soft and non-tender; with positive bowel sounds. Musculoskeletal: warm without cyanosis. There is no lower leg edema. Skin:  no jaundice or rashes, no wounds Neurologic: no gross neuro deficits Psychiatric:  alert and oriented x ppt CHEST XRAY:  
No new imaging LAB No lab exists for component: [place] Recent Labs 10/29/18 
6292 10/27/18 
7506 WBC 6.0 7.1 HGB 9.0* 9.9*  
HCT 31.6* 34.6*  
 395 Recent Labs 10/29/18 
4327 10/27/18 
1458 * 136  
K 3.6 4.6 CL 89* 93* CO2 37* 37* * 77 BUN 22 22 CREA 1.29* 1.25* CA 8.9 8.6 No results for input(s): PH, PCO2, PO2, HCO3 in the last 72 hours. Assessment:  (Medical Decision Making) Hospital Problems  Never Reviewed Codes Class Noted POA * (Principal) Endocarditis due to methicillin susceptible Staphylococcus aureus (MSSA) ICD-10-CM: I33.0, B95.61 ICD-9-CM: 421.0, 041.11  10/23/2018 Yes Acute respiratory failure ([facility] ICD-10-CM: J96.00 
ICD-9-CM: 518.81  10/23/2018 Yes Hypoproteinemia ([facility] ICD-10-CM: E77.8 ICD-9-CM: 273.8  10/17/2018 Unknown Pleural effusion ICD-10-CM: J90 ICD-9-CM: 511.9  10/16/2018 Unknown Overview Signed 10/24/2018  9:03 AM by Trinity Chan MD  
  Loculated on the right Hypomagnesemia ICD-10-CM: U39.20 
ICD-9-CM: 275.2  10/16/2018 Unknown Hypokalemia ICD-10-CM: E87.6 ICD-9-CM: 276.8  10/16/2018 Unknown Sepsis ([facility] ICD-10-CM: A41.9 ICD-9-CM: 038.9, 995.91  10/14/2018 Unknown MSSA bacteremia ICD-10-CM: R78.81 ICD-9-CM: 790.7, 041.11  10/14/2018 Unknown Septic arthritis ([facility] ICD-10-CM: M00.9 ICD-9-CM: 711.00  10/14/2018 Unknown Prolonged Q-T interval on ECG ICD-10-CM: R94.31 
ICD-9-CM: 794.31  10/14/2018 Unknown Transaminitis ICD-10-CM: R74.0 ICD-9-CM: 790.4  10/14/2018 Unknown Hyponatremia ICD-10-CM: E87.1 ICD-9-CM: 276.1  10/14/2018 Unknown Systolic CHF, acute on chronic (HCC) ICD-10-CM: I68.29 ICD-9-CM: 428.23, 428.0  8/30/2018 Yes Heroin abuse ([facility] ICD-10-CM: F11.10 ICD-9-CM: 305.50  4/10/2018 Yes Patient with drug use, endocarditis and resultant pleural effusion. S/p multiple thoracentesis. Plan:  (Medical Decision Making) -repeat CXR today to assess joey re-accumulation of pleural fluid.  
-if no significiant fluid pulmonary may be able to sign off.  
-satting well on 1L O2. Wean off as able.  
-abx as per ID. Mickie Henderson MD

## 2018-10-29 NOTE — PROGRESS NOTES
Infectious Disease Progress Note Today's Date: 10/29/2018 Admit Date: 10/14/2018 Impression: · Pleural effusions R>L, s/p thoracentesis 10/16/18 with 1000 ml serosanguinous fluid removed, exudative, culture negative; repeat CXR pending today · MSSA bacteremia and presumed R-sided endocarditis with septic pulmonary emboli, s/p incomplete treatment (left AMA after 2 1/2 weeks of treatment) · R sacroiliac septic arthritis · IVDU: heroin, also uses cocaine; on methadone Plan:  
· Continue cefazolin. Her initial EOT was 10/22/18, but she missed two full weeks of treatment, leaving [address] AMA  and being admitted here 10/14/18. She will need an additional two weeks, of treatment, with new EOT 18. Would probably also extend treatment further with oral antibiotics on discharge. All blood cultures done here are negative to date. · Mild increase in CRT today but other labs seem ok. Has been getting diuresis, 10L off so far per IM. Repeat BMP in AM.  
· Pt needs to see her  10/25; she is planning to move to [place] to be with her mom and get help with her addiction. Anti-infectives: · cefazolin Subjective:  
Lying comfortably in bed; no complaints beyond nausea with IV antibiotic controlled with nausea meds. Review of Systems:  Pertinent items are noted in the History of Present Illness. Objective:  
 
Visit Vitals /62 (BP 1 Location: Left arm, BP Patient Position: At rest;Head of bed elevated (Comment degrees)) Pulse 86 Temp 97.2 °F (36.2 °C) Resp 20 Ht 6' (1.829 m) Wt 77.5 kg (170 lb 13.7 oz) SpO2 98% Breastfeeding? No  
BMI 23.17 kg/m² Temp (24hrs), Av.7 °F (36.5 °C), Min:97.2 °F (36.2 °C), Max:98.4 °F (36.9 °C) Physical Exam:  
General:  Alert, cooperative, well nourished, well developed, appears stated age Eyes:  Sclera anicteric. Pupils equally round and reactive to light. Mouth/Throat: Mucous membranes normal, oral pharynx clear; no thrush Neck: Supple Lungs:   Clear to auscultation bilaterally, good effort CV:  Regular rate and rhythm, 2/6 systolic murmur at LLSB, click, rub or gallop Abdomen:   Soft, non-tender. bowel sounds normal. non-distended Extremities: No cyanosis, 2+ edema Skin: Skin color, texture, turgor normal. no acute rash or lesions Lymph nodes: Cervical and supraclavicular normal  
Musculoskeletal: No swelling or deformity Lines/Devices:  Intact, no erythema, drainage or tenderness Psych: Alert and oriented, normal mood affect given the setting Data Review: CBC:  
Recent Labs 10/29/18 
5391 10/27/18 
4126 WBC 6.0 7.1 RBC 3.42* 3.75* HGB 9.0* 9.9*  
HCT 31.6* 34.6*  
 395 GRANS 57 52 LYMPH 29 35 EOS 1 2 CMP:  
Recent Labs 10/29/18 
7414 10/27/18 
5493 * 77 * 136  
K 3.6 4.6 CL 89* 93* CO2 37* 37* BUN 22 22 CREA 1.29* 1.25* CA 8.9 8.6 AGAP 8 6 Microbiology: No new findings. Imaging:  
Result Comparison XR CHEST PA LAT (Order [id number]) Newer Version Older Version Final result 10/23/2018 10:00 AM   
[place], Rad Results In   
    
This is the newest version No older versions exist  
Narrative PA LATERAL CHEST X-RAY HISTORY: Pleural effusion COMPARISON: October 18, 2018 FINDINGS: EKG leads are present. The cardiac silhouette appears enlarged. A  
moderate to large pleural effusion is present on the right side along with a  
probable small left pleural effusion. There is bilateral lower lobe atelectasis  
or consolidation. Impression IMPRESSION: Bilateral pleural effusions with lower lobe atelectasis or  
consolidation. These may be complex effusions. Signed By: Tim Kent MD   
 October 29, 2018

## 2018-10-29 NOTE — PROGRESS NOTES
Problem: Interdisciplinary Rounds Goal: Interdisciplinary Rounds Interdisciplinary team rounds were held 10/29/2018 with the following team members:Care Management, Nursing, Nutrition, Pharmacy, Physical Therapy and Physician and the patient. Plan of care discussed. See clinical pathway and/or care plan for interventions and desired outcomes.

## 2018-10-29 NOTE — PROGRESS NOTES
Assessment complete via flow sheet. Pt  A&O./  Respirations unlabored. 02 at 1L/min via NC. S1 S2 auscultated. Pt reports pain level of 0 out of 10. Bowel sounds active. Denies other needs. Bed in lowest position, side rails up X3; call bell in reach. Instructed to call for assistance. Pt verbalized understanding.

## 2018-10-29 NOTE — PROGRESS NOTES
Hospitalist Progress Note Patient: Low Parada MRN: [id number]  SSN: [id number] YOB: 1985  Age: [place] y.o. Sex: female Admit Date: 10/14/2018 LOS: 15 days Subjective:  
 
From previous notes: \"29 year old female with history of CHF EF 10% diagnosed 4/18, IVDU heroin, presented to the ED with worsening shortness of breath pedal edema, with on going heroin use. She had signed out of [facility]  9/28 Where she was being treated for MSSA bacteremia/endocarditis, (with suspicion of heroin use via her picc line) with end date at that time 10/14. Since then she was off her CHF medications. And had not received any antibiotics She presented with pain in hip, worsening shortness of breath, and decompensated CHF. She has been started on cefazolin here by ID with EOT 11/5. We have diuresed her 10L, and currently is on spironolactone and torsemide with on going good diuresis. \" 
 
10/27 - She feels good this morning. Denies CP/SOB. Still with BLE edema. Had some hypotension overnight but asymptomatic. 
10/28 - She states \"my feet look like a normal human's today\". Denies CP/SOB. Dora Josué F/C/N/V. 
10/29 - States that she feels good this morning. Denies CP/SOB. Still with mild LE edema. Review of systems negative except stated above. Objective:  
 
Visit Vitals /65 Pulse 96 Temp 98.4 °F (36.9 °C) Resp 20 Ht 6' (1.829 m) Wt 77.5 kg (170 lb 13.7 oz) SpO2 98% Breastfeeding? No  
BMI 23.17 kg/m² Oxygen Therapy O2 Sat (%): 98 % (10/29/18 0356) Pulse via Oximetry: 96 beats per minute (10/28/18 1619) O2 Device: Nasal cannula (10/28/18 2107) O2 Flow Rate (L/min): 1 l/min (10/28/18 2107) FIO2 (%): 24 % (10/28/18 2107) Intake and Output:  
 
Intake/Output Summary (Last 24 hours) at 10/29/2018 1163 Last data filed at 10/29/2018 4659 Gross per 24 hour Intake  Output 4300 ml Net -4300 ml Physical Exam: GENERAL: alert, cooperative, no distress, appears stated age EYE: conjunctivae/corneas clear. PERRL. THROAT & NECK: normal and no erythema or exudates noted. LUNG: clear to auscultation bilaterally HEART: regular rate and rhythm, S1S2, no murmur, no JVD ABDOMEN: soft, non-tender, non-distended. Bowel sounds normal.  
EXTREMITIES:  1+ BLE edema, 2+ pedal/radial pulses bilaterally SKIN: no rash or abnormalities NEUROLOGIC: A&Ox3. Cranial nerves 2-12 grossly intact. Lab/Data Review: 
Recent Results (from the past 24 hour(s)) CBC WITH AUTOMATED DIFF Collection Time: 10/29/18  4:56 AM  
Result Value Ref Range WBC 6.0 4.3 - 11.1 K/uL  
 RBC 3.42 (L) 4.05 - 5.2 M/uL HGB 9.0 (L) 11.7 - 15.4 g/dL HCT 31.6 (L) 35.8 - 46.3 % MCV 92.4 79.6 - 97.8 FL  
 MCH 26.3 26.1 - 32.9 PG  
 MCHC 28.5 (L) 31.4 - 35.0 g/dL RDW 22.7 % PLATELET 929 774 - 165 K/uL MPV 8.7 (L) 9.4 - 12.3 FL ABSOLUTE NRBC 0.00 0.0 - 0.2 K/uL  
 DF AUTOMATED NEUTROPHILS 57 43 - 78 % LYMPHOCYTES 29 13 - 44 % MONOCYTES 12 4.0 - 12.0 % EOSINOPHILS 1 0.5 - 7.8 % BASOPHILS 1 0.0 - 2.0 % IMMATURE GRANULOCYTES 1 0.0 - 5.0 %  
 ABS. NEUTROPHILS 3.4 1.7 - 8.2 K/UL  
 ABS. LYMPHOCYTES 1.7 0.5 - 4.6 K/UL  
 ABS. MONOCYTES 0.7 0.1 - 1.3 K/UL  
 ABS. EOSINOPHILS 0.1 0.0 - 0.8 K/UL  
 ABS. BASOPHILS 0.1 0.0 - 0.2 K/UL  
 ABS. IMM. GRANS. 0.0 0.0 - 0.5 K/UL METABOLIC PANEL, BASIC Collection Time: 10/29/18  4:56 AM  
Result Value Ref Range Sodium 134 (L) 136 - 145 mmol/L Potassium 3.6 3.5 - 5.1 mmol/L Chloride 89 (L) 98 - 107 mmol/L  
 CO2 37 (H) 21 - 32 mmol/L Anion gap 8 mmol/L Glucose 124 (H) 65 - 100 mg/dL BUN 22 6 - 23 MG/DL Creatinine 1.29 (H) 0.6 - 1.0 MG/DL  
 GFR est AA >60 >60 ml/min/1.73m2 GFR est non-AA 50 ml/min/1.73m2 Calcium 8.9 8.3 - 10.4 MG/DL Imaging: Xr Chest Sngl V Result Date: 10/26/2018 IMPRESSION: No pneumothorax seen.  Decrease in the fluid on the right, please see above comments. Xr Chest Pa Lat Result Date: 10/26/2018 Impression: Stable two-view chest.  
 
Ir Thoracentesis/insert Chest Tube Result Date: 10/26/2018 IMPRESSION: 3 separate loculations were aspirated and fluid from the first was sent for cytology. PA chest x-ray obtained afterwards demonstrates smaller lateral loculation of fluid. In the deep inferior thorax that may be residual fluid possibly amenable to thoracentesis at another time. Cytology pending. No results found for this visit on 10/14/18. Cultures: All Micro Results Procedure Component Value Units Date/Time CULTURE, BODY FLUID Diego Rod [[id number]] Collected:  10/26/18 0830 Order Status:  Completed Specimen:  Pleural Fluid Updated:  10/28/18 8282 Special Requests: NO SPECIAL REQUESTS     
  GRAM STAIN NO WBCS SEEN     
   NO DEFINITE ORGANISM SEEN Culture result: NO GROWTH 2 DAYS     
 CULTURE, BLOOD [[id number]] Collected:  10/17/18 1321 Order Status:  Completed Specimen:  Blood Updated:  10/22/18 0815 Special Requests: --     
  RIGHT 
HAND Culture result: NO GROWTH 5 DAYS     
 CULTURE, BLOOD [[id number]] Collected:  10/17/18 1319 Order Status:  Completed Specimen:  Blood Updated:  10/22/18 0815 Special Requests: --     
  RIGHT 
HAND Culture result: NO GROWTH 5 DAYS     
 CULTURE, BLOOD [[id number]] Collected:  10/15/18 2233 Order Status:  Completed Specimen:  Blood Updated:  10/20/18 1150 Special Requests: RIGHT ANTECUBITAL Culture result: NO GROWTH 5 DAYS     
 CULTURE, BLOOD [[id number]] Collected:  10/15/18 2235 Order Status:  Completed Specimen:  Blood Updated:  10/20/18 1150 Special Requests: RIGHT ARM Culture result: NO GROWTH 5 DAYS FUNGUS CULTURE AND SMEAR [[id number]] Collected:  10/16/18 0323 Order Status:  Completed Specimen:  Other from Miscellaneous sample Updated:  10/19/18 1138 Source THORACENTESIS Comment: RIGHT Corrected on 10/16 AT 0951: This is a correction to the medical record of the test results previously reported as THORACENTESIS Fungus stain Direct Inoculation Fungus (Mycology) Culture Other source received Comment: (NOTE) Performed At: [address] [id number] Sameera Wild MD [id number] CULTURE, BLOOD [[id number]] Collected:  10/14/18 1250 Order Status:  Completed Specimen:  Whole Blood Updated:  10/19/18 0809 Special Requests: --     
  RIGHT 
HAND Culture result: NO GROWTH 5 DAYS     
 CULTURE, BLOOD [[id number]] Collected:  10/14/18 1251 Order Status:  Completed Specimen:  Whole Blood Updated:  10/19/18 0809 Special Requests: --     
  HAND 
LEFT Culture result: NO GROWTH 5 DAYS     
 CULTURE, BODY FLUID [address] [[id number]] Collected:  10/16/18 9732 Order Status:  Completed Specimen:  Thoracentesis Updated:  10/18/18 5090 Special Requests: NO SPECIAL REQUESTS     
  GRAM STAIN 0 TO 10 WBC'S/OIF  
   NO DEFINITE ORGANISM SEEN Culture result: NO GROWTH 2 DAYS     
 AFB CULTURE + SMEAR W/RFLX ID FROM CULTURE [[id number]] Collected:  10/16/18 0951 Order Status:  Completed Specimen:  Miscellaneous sample Updated:  10/17/18 1736 Source THORACENTESIS Comment: RIGHT Corrected on 10/16 AT 0951: This is a correction to the medical record of the test results previously reported as THORACENTESIS 
  
  
  AFB Specimen processing Concentration Acid Fast Smear NEGATIVE Comment: (NOTE) Performed At: [address] [id number] Sameera Wild MD OP:[id number] Acid Fast Culture PENDING  
 CULTURE, BLOOD [[id number]] Collected:  10/14/18 1300 Order Status:  Canceled Specimen:  Whole Blood Assessment/Plan:  
 
Principal Problem: 
  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 
- Continue Cefazolin - EOT 11/5/18 Active Problems: MSSA bacteremia (10/14/2018) - Due to IVDU 
- Continue Cefazolin - EOT 11/5/18 Sepsis ([address] (10/14/2018) - Due to #1 
- Resolved with abx Septic arthritis ([address] (10/14/2018) - Improving 
- Continue Cefazolin Systolic CHF, acute on chronic ([address] (8/30/2018) - EF 10% 
- Continue Demadex 20mg BID 
- Continue Spironolactone 50mg BID 
- Change Toprol BID to QHS 
- Continue Lisinopril 2.5mg 
 
  Acute respiratory failure ([address] (10/23/2018) - Improving with diuresis - S/P thoracentesis by pulm and IR on 10/27/18 
- Continue oxygen - Pulmonary following Prolonged Q-T interval on ECG (10/14/2018) Hyponatremia (10/14/2018) - Resolved Pleural effusion (10/16/2018) - S/P thoracentesis by pulm and IR on 10/27/18 - Pulmonary following Hypomagnesemia (10/16/2018) - Mag 2.2 
- Continue to follow Hypokalemia (10/16/2018) - Resolved Heroin abuse ([address] (4/10/2018) - Continue Methadone 
- CANNOT leave with PICC line Transaminitis (10/14/2018) - Resolved Hypoproteinemia ([address] (10/17/2018) - Due to drug use and lack of appetite Dispo - Continue Cefazolin EOT 11/5/18. Had thoracentesis by IR on 10/26/18. DIET NUTRITIONAL SUPPLEMENTS All Meals; Ensure [employer] DIET REGULAR 2 GM NA (House Low NA) DVT Prophylaxis: Heparin Signed By: Em Fletcher DO October 29, 2018

## 2018-10-29 NOTE — PROGRESS NOTES
Respiratory Care Services Policy Number: [id number] Title: Patient Care Assessment Program 
 
Effective Date: 01/1999 Revised Date: 05/2014 Reviewed Date: 06/2013/ 03/2015, 03/2016, 06/2017 Overview In an effort to improve quality and reduce costs of respiratory care at [facility], the Respiratory Department has developed a number of Patient Care Protocols. These protocols have been developed according to Paul 3 and are utilized for those patients who are ordered respiratory therapy using therapeutic indications and standardized approaches for accomplishing objectives. Patient Care Protocols are intended to improve care by: ? Defining the indications and standards of care agreed upon by the Pulmonary Medicine and [address] [place] [address] of [facility]. ? Training respiratory care practitioners to apply those criteria to individual patients and modify therapy as indicated by the protocols. ? Documenting the indication and care plan as part of the initial ordering process. ? Tapering or discontinuing treatments once the indication for therapy changes. The Patient Care Protocols shall be universally applied throughout the hospital as determined by  
the Pulmonary Medicine and [address] [place] [address]. Rationale for Patient Care Assessment Protocols: 
? Continuous Quality Improvement ? Cost containment ? Standardization of care 
? Enhanced continuity of care ? Utilization review ? Timely intervention The following patient care assessment protocols have been developed: ? Aerosolized Medication ? Bronchial Hygiene ? Oxygen Weaning Protocol ? Volume Expansion/Secretion Clearance Non-Vented ? Ventilator Weaning Protocol ? CVRU Fast Track Weaning Protocol ? Asthma Treatment Protocol ER ? Pediatric Asthma Treatment Protocol ER ? Alpha-1 Antitrypsin Deficiency Protocol ? Prone Positioning Protocol The Director of [address] [place] [address] oversees the Patient Care Assessment Program. The Clinical/ is responsible for protocol development and training. The Supervisor is responsible for implementation and  activities. Each patient with an order for respiratory treatments will receive an evaluation. All Registered Respiratory Care Practitioners (RCPs) will perform the evaluations. The same evaluation tool will be utilized for all initial and follow-up assessments. If the patient does not meet criteria for ordered therapy, the therapy will be discontinued. If the patient demonstrates an adverse response to initially ordered therapy, the therapy will be discontinued and the physician will be contacted. Specific physician's orders that deviate from protocols and are deemed \"inappropriate\" or \"unsafe\" will be addressed with ordering physician and/or medical director as required. Patients of [facility] and [facility] [address] will not be assessed for the first 24 hours as the Medical Director of [address] [place] [address] has requested that changes in therapy should not be made during that time frame. Respiratory Patient Care Assessment Protocols I.  Policy: In an effort to provide quality patient care and effective utilization of services, physicians who order respiratory therapy will have their patients treated via the protocols established (see attached). All Registered [address] (RCPs) will complete the initial assessment. This assessment will indicate patient needs, and the care plan developed for the patient and will performed within 24 hours of admission. Frequency of the therapy will be set according to the results of the respiratory therapy evaluation and frequency guidelines policy.  Reassessment will be continued done every 48 hours and more frequently as needed for the individual patient. II. Purpose: A. To provide a process that will allow for ongoing assessment and care plan modification for patients receiving respiratory services based on both objective and or subjective patient responses to interventions. This process of protocol utilization will assist in patient care progression while eliminating the need for the physician to continually update respiratory therapy orders. B. To assure continuity of respiratory care that meets [facility] Clinical Practice Guidelines. III. Initiation:  Implement Respiratory Care Protocols for patients who are ordered by physician  
       to receive respiratory therapy procedures or for ventilator management. IV. Protocol: A. Upon receiving an order for therapy the RCP will review the patients EMR (electronic medical record) for all pertinent information includin. Physicians order for therapy 2. Patient history and physical examination 3. Physician progress notes 4. Diagnostic. X-rays, PFTs, arterial blood gases etc. 
 
 
B. The RCP will perform a respiratory assessment in the following manner: 1. Perform hand hygiene per hospital policy utilizing Standard Precautions for all patients and following transmission-based isolation as indicated per policy. 2. Identify patient via ID bracelet verifying patient name and birth date. 3. General observations: color, pattern and effort of breathing, chest expansion, (symmetrical and bilateral), level of consciousness and the ability to ambulate. 4. The RCP will assess patients cough ability and determine if Nasotracheal suctioning is needed. If patient is unable to produce sputum, at that time, the RCP should question the patient with regard to their sputum: production, color consistency, frequency and amount.  
5. Auscultation: Using a stethoscope, the RCP will listen and note quality of breath sounds and presence or absence of adventitious breath sounds in all lung fields, both anteriorly and posteriorly. 6. Upon completing the EMR review and physical assessment, the RCP will document findings in the RT Assessment section of the EMR. The score level will be provided and will be used to determine the frequency of therapy. V. Indications: A. Indications for Bronchial Hygiene Protocol will include: 1. Potential for or presence of atelectasis. 2. Need for hydration and removal of retained secretions. 3. Need for improvement of cough effectiveness. 4. Presence of conditions associated with disorder of pulmonary clearance: 
a. Cystic fibrosis b. Bronchiectasis B. Indications for Aerosolized Medication(s) Protocol should include: 1. Treatment of bronchospasm/wheezing 2. Improvement of mucociliary clearance 3. Treatment of stridor 4. History of Asthma or COPD 
           C.  Indications for Oxygen Therapy Protocol should include: 1. Documented hypoxemia 2. Severe trauma 3. Acute myocardial infarction 4. Short-term therapy (e.g. post anesthesia recovery) VI. Maintenance: A. Timely patient assessment is an integral part of this protocol therefore the following will be applied: 1. All non- critical care patients will be evaluated upon receiving initial respiratory care orders within 24 hours and re-evaluated within 48 hours (or more as needed). 2. Orders requesting a Respiratory Consult will be responded to in the following manner: 
a. In patient emergency situations, the RCP assigned to the floor will respond immediately to the patient, provide an initial respiratory assessment, and contact the patients physician as necessary for appropriate orders. b. In non-emergent situations, the RCP assigned to the floor will respond to the patient within 90 minutes and provide an initial respiratory assessment and contact patients physician as necessary for appropriate orders. c. An RCP will provide a comprehensive assessment as soon as possible. 3. Upon completion of an evaluation, the RCP will complete documentation in the patients EMR in the RT Assessment section. 4. The RCP who completes the assessment will document orders for therapy in the orders section of the patients EMR selecting new order. Next, per protocol should be selected indicating it is a protocol order and sign orders should be selected to complete the process. 5. The Pharmacy and Therapeutics (P&T) Committee has mandated that the medication Xopenex may be changed to unit dose albuterol without an order, except for those patients receiving Xopenex due to cardiac arrhythmias. The dosage for these patients should be 0.63 mg. and may be changed from 1.25 mg. to 0.63 mg per P & T Committee by the RCP completing the assessment. 6. Patients who are not experiencing cardiac arrhythmias, and are ordered Xopenex and Atrovent may be changed to Duoneb. VII. Safety: A. The following safety issues shall be monitored: 1. The RCP will perform hand hygiene per hospital policy utilizing Standard Precautions for all patients and following transmission-based isolation as indicated per hospital policy. 2. The RCP must exercise professional judgment in classifying the patient for frequency of therapy. 3. Appropriate classification of the patient will require an evaluation utilizing the Therapy Assessment Protocol Guidelines. 4. The RCP will confer with the physician concerning the care of the patient at any time questions or problems arise. 5. If during therapy, the patient exhibits no improvement or deterioration in clinical status the RCP will notify the physician and the patients nurse. VIII. Interventions: A. The patients nurse is responsible concerning all items related to his/her care. Ongoing communication with nursing is essential to successful protocol management. B. The RCP recognizes the value of the team approach in meeting the patients needs. Nursing input regarding the patients pulmonary condition will be sought as needed. IX. Reportable conditions: The RCP will inform the physician if: 1. There are acute changes in patients respiratory status. 2. The therapist is unable to determine appropriate care plan upon assessment. 3. The patient fails to reach therapeutic objective. 4. A change or additional medication is needed. X.  Patient Education: A. Patient will receive instruction on the followin. The treatment modality, including objectives and proper technique of therapy 2. Respiratory medications B. Documentation shall occur in the patient education section of the patients EMR. XI. Documentation: Record all findings as described above in the patients EMR. Related Protocols: A. Aerosolized Medication Protocol B. Bronchial Hygiene C. Oxygen Protocol D. Volume Expansion/Secretion Clearance E. Ventilator Weaning Protocols References: 
[address] Standard L    Respiratory Care Department Policy, Procedure and Protocol Guideline Manual, , AKHIL Wall. L  Therapist Driven Respiratory Care Protocols  A Practitioners Guide for Criteria-Based Respiratory Care by Wilton Sagastume M.D., and AKHIL Jerome RRT. L  The rationale for therapist-driven protocols: an update. Respiratory Care 1998; 53:928-784 N   [facility] Clinical Practice Guidelines. Respiratory Care Services Policy Number: -[id number] Title: Aerosolized Medication Protocol Effective Date: 10/1998 Revised Date: 2013, 2016 Reviewed Date: 2014/ 2015 , 2017 I. Policy: The Aerosolized Medication Protocol shall by implemented by Respiratory Care              Practitioners (RCP) for patients with orders to receive aerosol therapy with medication. II. Purpose: To open and maintain obstructed airways, the RCP, will utilize the following  
protocol to select the indicated aerosolized medication(s) and determine the most effective method of delivery to the patient. III. Patient Type: All patients who are determined to meet aerosolized medication criteria as  
       outlined in this protocol. IV. Responsibility: Director, [address], registered Respiratory Care                                                     Practitioners (RCPs) with documented competency in the performance of                                     respiratory therapeutic techniques. V. Equipment needed: A. Stethoscope B. Pulse oximeter C. IPPB machine and circuit D. Aerosol nebulizer E. MDI Inhaler VI. Protocol: A. The following conditions are accepted indications for aerosolized medication therapy. 1. Bronchospasm/wheezing 2. Impaired mucociliary clearance 3. Tracheobronchial mucosal congestion/and laryngeal stridor 4. Diseases which commonly require aerosolized medication therapy include, but are not limited to: 
a. Asthma/reactive airway disease 
b. Bronchitis/emphysema (COPD) c. Cystic fibrosis 
d. Severe laryngitis/tracheitis 
e. Bronchiectasis 
f. Smoke inhalation or chemical trauma to the lung or upper airway 
g. Physical trauma to the upper airway 
h. Laryngotracheobronchitis 
i. Bronchiolitis 
j. Non-specific wheezing B. Indications for bronchodilator medications will include: 
a. Bronchospasm/ wheezing 
b. Asthma/reactive airway disease 
c. Chronic obstructive pulmonary disease 
d. Obstructive defect on pulmonary function testing C. Administration of medications 1. If a bronchodilator or any other type of respiratory medication is needed, a physician order must be indicated in the medication section in the patients EMR.   
2. When the physician specifies the medication and dosage at the time of request, the ordered medication will be used as part of the care plan. D. The following guidelines will be utilized in the evaluation and selection of the         appropriate delivery device for indicated medication(s): 
1. IPPB 
a. Ventilation is inadequate (rapid shallow breathing) b. Refractory atelectasis has developed, other forms of therapy are unsuccessful 
c. Inability to clear secretions 
d. Need to improve lung expansion 2. Unassisted aerosol (UA) is the preferred method of aerosol delivery and indicated if 
a. Ventilation is inadequate 
b. Patient demonstrates wheezing  
c. patient is unable to perform MDI effectively  
d. Patient preference 3. Metered Dose Inhaler (MDI)  
a. Patient is alert/cooperative 
b. Medication(s) available in this delivery method. c. Able to perform 3 second breath hold. d. Patient has demonstrated ability to use MDI effectively 
e. Patient has used MDI therapy previously, either at home or in the hospital. 
f. Note: The only approved inhalers on formulary are albuterol and Spiriva. VII. Guidelines:  
Monitor patients vital signs and evaluate patients clinical status. The need to change medication and/or modality may be indicated by: 1. A pulse greater than 120 bpm, or if a pulse increase of 20 bpm occurs with bronchodilator medications. 2. Significant worsening of dyspnea or wheezing occurring during or within 30 minutes of discontinuing therapy. 3. Worsening of patients sensorium (e.g. patient becomes confused or obtunded, and unable to follow directions). 4. Worsening of patients chest x-ray. 5. Change in sputum (e.g. increased pulmonary infiltrate, which might indicate need for volume expansion therapy). 6. Patient has difficulty coughing up secretions, which might indicate need for acetylcysteine and/or bronchial hygiene therapy. 7. Call physician immediately if dyspnea worsens and is not responsive to modifications allowed by protocol. VIII. Clinical Responsibility: A. The therapy assessment guidelines will be used to evaluate all patients receiving aerosolized medications with the exception of critical care areas. 1. RCPs will perform changes in therapy per protocol. 2. It will be the responsibility of RCP to provide instruction regarding respiratory medications, aerosol therapy and proper MDI technique, as well as, spacer usage to patients ordered MDI therapy. 3. Current therapy that is part of a patients home regimen will not be discontinued. IX. Documentation A. Document assessment findings in the respiratory assessment section of the patients EMR. B. Document changes in therapy per protocol in the respiratory orders section and in the care plan section of the patients EMR. C. Document patient education in the patient education section of the patients EMR. X. Outcome Criteria: A. Relief of wheezes and obstruction B. Improved cough and sputum color and consistency C. Improved chest x-ray D. Improved arterial oxygen tension and or SaO2 
E. Improved Peak Flow on asthmatic patients XI. Related Protocols: A. Respiratory Patient Care Protocols B. Bronchial Hygiene Therapy C. Oxygen Protocol Reference:

## 2018-10-30 PROCEDURE — 77010033678 HC OXYGEN DAILY

## 2018-10-30 PROCEDURE — 94640 AIRWAY INHALATION TREATMENT: CPT

## 2018-10-30 PROCEDURE — 99232 SBSQ HOSP IP/OBS MODERATE 35: CPT | Performed by: INTERNAL MEDICINE

## 2018-10-30 PROCEDURE — 74011250637 HC RX REV CODE- 250/637: Performed by: INTERNAL MEDICINE

## 2018-10-30 PROCEDURE — 65270000029 HC RM PRIVATE

## 2018-10-30 PROCEDURE — 94760 N-INVAS EAR/PLS OXIMETRY 1: CPT

## 2018-10-30 PROCEDURE — 74011000250 HC RX REV CODE- 250: Performed by: FAMILY MEDICINE

## 2018-10-30 PROCEDURE — 74011250636 HC RX REV CODE- 250/636: Performed by: INTERNAL MEDICINE

## 2018-10-30 PROCEDURE — 74011000250 HC RX REV CODE- 250: Performed by: INTERNAL MEDICINE

## 2018-10-30 RX ORDER — ALBUTEROL SULFATE 0.83 MG/ML
2.5 SOLUTION RESPIRATORY (INHALATION)
Status: DISCONTINUED | OUTPATIENT
Start: 2018-10-30 | End: 2018-11-05 | Stop reason: HOSPADM

## 2018-10-30 RX ADMIN — FERROUS SULFATE TAB 325 MG (65 MG ELEMENTAL FE) 325 MG: 325 (65 FE) TAB at 11:04

## 2018-10-30 RX ADMIN — ACETAMINOPHEN 650 MG: 325 TABLET, FILM COATED ORAL at 15:15

## 2018-10-30 RX ADMIN — Medication 400 MG: at 21:35

## 2018-10-30 RX ADMIN — Medication 10 ML: at 05:50

## 2018-10-30 RX ADMIN — ONDANSETRON HYDROCHLORIDE 4 MG: 2 INJECTION INTRAMUSCULAR; INTRAVENOUS at 09:24

## 2018-10-30 RX ADMIN — ALBUTEROL SULFATE 2.5 MG: 2.5 SOLUTION RESPIRATORY (INHALATION) at 20:36

## 2018-10-30 RX ADMIN — HEPARIN SODIUM 5000 UNITS: 5000 INJECTION INTRAVENOUS; SUBCUTANEOUS at 05:49

## 2018-10-30 RX ADMIN — SPIRONOLACTONE 50 MG: 25 TABLET ORAL at 17:21

## 2018-10-30 RX ADMIN — Medication 10 ML: at 15:15

## 2018-10-30 RX ADMIN — Medication 10 ML: at 21:36

## 2018-10-30 RX ADMIN — Medication 400 MG: at 15:15

## 2018-10-30 RX ADMIN — SENNOSIDES AND DOCUSATE SODIUM 2 TABLET: 8.6; 5 TABLET ORAL at 21:35

## 2018-10-30 RX ADMIN — METHADONE HYDROCHLORIDE 30 MG: 10 TABLET ORAL at 17:20

## 2018-10-30 RX ADMIN — METOPROLOL SUCCINATE 25 MG: 25 TABLET, EXTENDED RELEASE ORAL at 17:20

## 2018-10-30 RX ADMIN — METHADONE HYDROCHLORIDE 30 MG: 10 TABLET ORAL at 09:13

## 2018-10-30 RX ADMIN — TORSEMIDE 20 MG: 20 TABLET ORAL at 17:21

## 2018-10-30 RX ADMIN — Medication 2 G: at 17:20

## 2018-10-30 RX ADMIN — FERROUS SULFATE TAB 325 MG (65 MG ELEMENTAL FE) 325 MG: 325 (65 FE) TAB at 17:21

## 2018-10-30 RX ADMIN — PANTOPRAZOLE SODIUM 40 MG: 40 TABLET, DELAYED RELEASE ORAL at 09:14

## 2018-10-30 RX ADMIN — Medication 2 G: at 09:11

## 2018-10-30 RX ADMIN — FERROUS SULFATE TAB 325 MG (65 MG ELEMENTAL FE) 325 MG: 325 (65 FE) TAB at 09:14

## 2018-10-30 RX ADMIN — Medication 400 MG: at 09:14

## 2018-10-30 RX ADMIN — HEPARIN SODIUM 5000 UNITS: 5000 INJECTION INTRAVENOUS; SUBCUTANEOUS at 17:20

## 2018-10-30 RX ADMIN — POTASSIUM CHLORIDE 20 MEQ: 20 TABLET, EXTENDED RELEASE ORAL at 09:15

## 2018-10-30 RX ADMIN — ONDANSETRON HYDROCHLORIDE 4 MG: 2 INJECTION INTRAMUSCULAR; INTRAVENOUS at 17:26

## 2018-10-30 RX ADMIN — Medication 2 G: at 00:28

## 2018-10-30 RX ADMIN — ALBUTEROL SULFATE 2.5 MG: 2.5 SOLUTION RESPIRATORY (INHALATION) at 07:44

## 2018-10-30 RX ADMIN — SPIRONOLACTONE 50 MG: 25 TABLET ORAL at 09:12

## 2018-10-30 RX ADMIN — ONDANSETRON HYDROCHLORIDE 4 MG: 2 INJECTION INTRAMUSCULAR; INTRAVENOUS at 00:29

## 2018-10-30 NOTE — PROGRESS NOTES
Jean Cody Admission Date: 10/14/2018 Daily Progress Note: 10/30/2018 The patient's chart is reviewed and the patient is discussed with the staff. 
 
33 y.o.  female seen and evaluated at the request of Dr. Alfredo Tyson. She was admitted 10/14 with cc of SOB and LE edema. 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 Patient originally treated for bacteremia/endocarditis at [facility] with nafcillin ending treatment 10/22/18. Patient used IV heroin via her PICC line while at [facility] and left AMA once they discontinued her PICC line. Once discharged, patient had no follow up and has not been taking her CHF medications. Came in with increased SOB and LE edema. Admits to using IV heroin PTA. She was in icu rxed for sepsis.  Cards and ID have seen. Diuresed and on ancef for mssa endocarditis Had thoracentesis for 1000 ml ss fluid on 10/16/18-ldh on fluid was 732. Again on 10/26 with 300cc removed, another 250cc drained by IR same day. Subjective:  
 
Patient sitting up in chair. On 1L O2. No new complaints. CXR from yesterday reviewed with patient. Current Facility-Administered Medications Medication Dose Route Frequency  albuterol (PROVENTIL VENTOLIN) nebulizer solution 2.5 mg  2.5 mg Nebulization Q6HWA RT  
 metoprolol succinate (TOPROL-XL) XL tablet 25 mg  25 mg Oral QPM  
 spironolactone (ALDACTONE) tablet 50 mg  50 mg Oral BID  lisinopril (PRINIVIL, ZESTRIL) tablet 2.5 mg  2.5 mg Oral DAILY  ceFAZolin (ANCEF) 2 g/20 mL in sterile water IV syringe  2 g IntraVENous Q8H  
 ferrous sulfate tablet 325 mg  1 Tab Oral TID WITH MEALS  
 heparin (porcine) injection 5,000 Units  5,000 Units SubCUTAneous Q12H  
 magnesium oxide (MAG-OX) tablet 400 mg  400 mg Oral TID  methadone (DOLOPHINE) tablet 30 mg  30 mg Oral BID  pantoprazole (PROTONIX) tablet 40 mg  40 mg Oral ACB  polyethylene glycol (MIRALAX) packet 17 g  17 g Oral DAILY  potassium chloride (K-DUR, KLOR-CON) SR tablet 20 mEq  20 mEq Oral DAILY  senna-docusate (PERICOLACE) 8.6-50 mg per tablet 2 Tab  2 Tab Oral QHS  sodium chloride (NS) flush 10 mL  10 mL InterCATHeter Q8H  
 sodium chloride (NS) flush 5-10 mL  5-10 mL IntraVENous Q8H  
 torsemide (DEMADEX) tablet 20 mg  20 mg Oral BID  acetaminophen (TYLENOL) tablet 650 mg  650 mg Oral Q6H PRN  
 lip protectant (BLISTEX) ointment   Topical PRN  
 magnesium hydroxide (MILK OF MAGNESIA) 400 mg/5 mL oral suspension 30 mL  30 mL Oral DAILY PRN  
 ondansetron (ZOFRAN) injection 4 mg  4 mg IntraVENous Q4H PRN  
 sodium chloride (NS) flush 10 mL  10 mL InterCATHeter PRN  
 sodium chloride (NS) flush 5-10 mL  5-10 mL IntraVENous PRN Review of Systems Constitutional: negative for fever, chills, sweats Cardiovascular: negative for chest pain, palpitations, syncope, edema Gastrointestinal: negative for dysphagia, reflux, vomiting, diarrhea, abdominal pain, or melena Neurologic: negative for focal weakness, numbness, headache Objective:  
 
Vitals:  
 10/29/18 2124 10/30/18 0033 10/30/18 5851 10/30/18 0745 BP:  93/65 Pulse: (!) 107 86 Resp:  20 Temp:  98.2 °F (36.8 °C) SpO2:  97%  94% Weight:   162 lb (73.5 kg) Height:      
 
Intake and Output:  
10/28 1901 - 10/30 0700 In: -  
Out: Neha Tabares [[id number]] No intake/output data recorded. Physical Exam:  
Constitution:  the patient is well developed and in no acute distress EENMT:  Sclera clear, pupils equal, oral mucosa moist 
Respiratory: Crackles on left. Cardiovascular:  RRR without M,G,R 
Gastrointestinal: soft and non-tender; with positive bowel sounds. Musculoskeletal: warm without cyanosis. There is no lower leg edema. Skin:  no jaundice or rashes, no wounds Neurologic: no gross neuro deficits Psychiatric:  alert and oriented x ppt CHEST XRAY:  
10/29/18 1. Stable small right pleural effusion with slight decrease in the airspace 
opacity at the right lung base. LAB No lab exists for component: [place] Recent Labs 10/29/18 
3550 WBC 6.0 HGB 9.0*  
HCT 31.6*  
 Recent Labs 10/29/18 
2639 * K 3.6 CL 89* CO2 37* * BUN 22  
CREA 1.29* CA 8.9 No results for input(s): PH, PCO2, PO2, HCO3 in the last 72 hours. Assessment:  (Medical Decision Making) Hospital Problems  Never Reviewed Codes Class Noted POA * (Principal) Endocarditis due to methicillin susceptible Staphylococcus aureus (MSSA) ICD-10-CM: I33.0, B95.61 ICD-9-CM: 421.0, 041.11  10/23/2018 Yes [facility] respiratory [facility] ICD-10-CM: J96.00 
ICD-9-CM: 518.81  10/23/2018 Yes Hypoproteinemia ([facility] ICD-10-CM: E77.8 ICD-9-CM: 273.8  10/17/2018 Unknown Pleural effusion ICD-10-CM: J90 ICD-9-CM: 511.9  10/16/2018 Unknown Overview Signed 10/24/2018  9:03 AM by Nata Pugh MD  
  Loculated on the right Hypomagnesemia ICD-10-CM: Z02.69 
ICD-9-CM: 275.2  10/16/2018 Unknown Hypokalemia ICD-10-CM: E87.6 ICD-9-CM: 276.8  10/16/2018 Unknown Sepsis ([facility] ICD-10-CM: A41.9 ICD-9-CM: 038.9, 995.91  10/14/2018 Unknown MSSA bacteremia ICD-10-CM: R78.81 ICD-9-CM: 790.7, 041.11  10/14/2018 Unknown Septic arthritis ([facility] ICD-10-CM: M00.9 ICD-9-CM: 711.00  10/14/2018 Unknown Prolonged Q-T interval on ECG ICD-10-CM: R94.31 
ICD-9-CM: 794.31  10/14/2018 Unknown Transaminitis ICD-10-CM: R74.0 ICD-9-CM: 790.4  10/14/2018 Unknown Hyponatremia ICD-10-CM: E87.1 ICD-9-CM: 276.1  10/14/2018 Unknown Systolic CHF, acute on chronic (HCC) ICD-10-CM: N37.83 ICD-9-CM: 428.23, 428.0  8/30/2018 Yes Heroin abuse ([facility] ICD-10-CM: F11.10 ICD-9-CM: 305.50  4/10/2018 Yes Patient with drug use, endocarditis and resultant pleural effusion. S/p multiple thoracentesis. CXR yesterday with ongoing small R pleural effusion. Plan:  (Medical Decision Making)  
-no additional plans for thoracentesis for this small effusion.  
-satting well on 1L O2. Wean off as able.  
-abx as per ID.  
-No additional pulmonary input at this time. We will sign off but please let us know if new issues arise.   
 
 
 
Sachin Hannah MD

## 2018-10-30 NOTE — PROGRESS NOTES
Shift assessment complete. Respirations present. Even and unlabored. No s/s of distress. Zero c/o pain at this time. Call light within reach. Encouraged patient to call for assistance. Patient verbalizes understanding. See Doc Flowsheet for assessment details. Patient resting in bed. Midline catheter in place. ambulalting to bathroom at intervals. Voiding. Room air. Sitter at bedside. Male friend at bedside. +1 edema to bilateral ankles.

## 2018-10-30 NOTE — PROGRESS NOTES
PM assessment complete. No s/s pain or distress noted. Denies needs or pain. Sitter at bedside. Aware to call should needs arise.

## 2018-10-30 NOTE — PROGRESS NOTES
Hospitalist Progress Note Admit Date:  10/14/2018 12:05 PM  
Name:  Nany Jeffery Age:  28 y.o. 
:  1985 MRN:  [id number] PCP:  None Treatment Team: Attending Provider: Brittaney Quan DO; Care Manager: Millie Means Consulting Provider: Keny Rocha MD; Utilization Review: Joel Harper RN; Care Manager: Tha Clark Consulting Provider: Shabbir Mathis; Utilization Review: Staci Cook RN; Utilization Review: Edwina Rivera Subjective:  
From previous notes: \"29 year old female with history of CHF EF 10% diagnosed , IVDU heroin, presented to the ED with worsening shortness of breath pedal edema, with on going heroin use. She had signed out of [facility]   Where she was being treated for MSSA bacteremia/endocarditis, (with suspicion of heroin use via her picc line) with end date at that time 10/14. Since then she was off her CHF medications. And had not received any antibiotics She presented with pain in hip, worsening shortness of breath, and decompensated CHF. She has been started on cefazolin here by ID with EOT . We have diuresed her 10L, and currently is on spironolactone and torsemide with on going good diuresis. \" 
  
10/27 - She feels good this morning. Denies CP/SOB. Still with BLE edema. Had some hypotension overnight but asymptomatic. 
10/28 - She states \"my feet look like a normal human's today\". Denies CP/SOB. Jourdan Nap F/C/N/V. 
10/29 - States that she feels good this morning. Denies CP/SOB. Still with mild LE edema. 10/30/18 Sitting in chair on 1 lit/min nasal cannula Chronic tingling sensation left thigh Says doing ok Objective:  
 
Patient Vitals for the past 24 hrs: 
 Temp Pulse Resp BP SpO2  
10/30/18 0823 97.8 °F (36.6 °C) 86 20 99/59 99 % 10/30/18 0745     94 % 10/30/18 0033 98.2 °F (36.8 °C) 86 20 93/65 97 % 10/29/18 2124  (!) 107     
10/29/18 2101 99.3 °F (37.4 °C) (!) 128 20 109/72 97 % 10/29/18 2048     94 % 10/29/18 2047  (!) 118     
10/29/18 1603 99 °F (37.2 °C) 100 18 132/69 97 % Oxygen Therapy O2 Sat (%): 99 % (10/30/18 0823) Pulse via Oximetry: 86 beats per minute (10/30/18 0745) O2 Device: Nasal cannula (10/30/18 0745) O2 Flow Rate (L/min): 1 l/min (10/30/18 0745) FIO2 (%): 24 % (10/30/18 0745) Intake/Output Summary (Last 24 hours) at 10/30/2018 1214 Last data filed at 10/29/2018 1727 Gross per 24 hour Intake  Output 1050 ml Net -1050 ml General:    Well nourished. Alert.   
heent- normal 
CV:   RRR. No murmur, rub, or gallop. Lungs:   Minimal basilar crepitations Abdomen:   Soft, nontender, nondistended. Cns- no focal neurological deficits Extremities: Warm and dry. No cyanosis . mild pedal edema. Skin:     No rashes or jaundice. Data Review: 
I have reviewed all labs, meds, telemetry events, and studies from the last 24 hours. No results found for this or any previous visit (from the past 24 hour(s)). All Micro Results Procedure Component Value Units Date/Time CULTURE, BODY FLUID Kait Corley [[id number]] Collected:  10/26/18 0830 Order Status:  Completed Specimen:  Pleural Fluid Updated:  10/28/18 7777 Special Requests: NO SPECIAL REQUESTS     
  GRAM STAIN NO WBCS SEEN     
   NO DEFINITE ORGANISM SEEN Culture result: NO GROWTH 2 DAYS     
 CULTURE, BLOOD [[id number]] Collected:  10/17/18 1321 Order Status:  Completed Specimen:  Blood Updated:  10/22/18 0815 Special Requests: --     
  RIGHT 
HAND Culture result: NO GROWTH 5 DAYS     
 CULTURE, BLOOD [[id number]] Collected:  10/17/18 1319 Order Status:  Completed Specimen:  Blood Updated:  10/22/18 0815 Special Requests: --     
  RIGHT 
HAND Culture result: NO GROWTH 5 DAYS     
 CULTURE, BLOOD [[id number]] Collected:  10/15/18 2233 Order Status:  Completed Specimen:  Blood Updated:  10/20/18 1150 Special Requests: RIGHT ANTECUBITAL Culture result: NO GROWTH 5 DAYS     
 CULTURE, BLOOD [[id number]] Collected:  10/15/18 2235 Order Status:  Completed Specimen:  Blood Updated:  10/20/18 1150 Special Requests: RIGHT ARM Culture result: NO GROWTH 5 DAYS FUNGUS CULTURE AND SMEAR [[id number]] Collected:  10/16/18 1119 Order Status:  Completed Specimen:  Other from Miscellaneous sample Updated:  10/19/18 1138 Source THORACENTESIS Comment: RIGHT Corrected on 10/16 AT 0951: This is a correction to the medical record of the test results previously reported as THORACENTESIS Fungus stain Direct Inoculation Fungus (Mycology) Culture Other source received Comment: (NOTE) Performed At: [address] [id number] Corona Bruce MD OU:[id number] CULTURE, BLOOD [[id number]] Collected:  10/14/18 1250 Order Status:  Completed Specimen:  Whole Blood Updated:  10/19/18 0809 Special Requests: --     
  RIGHT 
HAND Culture result: NO GROWTH 5 DAYS     
 CULTURE, BLOOD [[id number]] Collected:  10/14/18 1251 Order Status:  Completed Specimen:  Whole Blood Updated:  10/19/18 0809 Special Requests: --     
  HAND 
LEFT Culture result: NO GROWTH 5 DAYS     
 CULTURE, BODY FLUID W Joao Courtney [[id number]] Collected:  10/16/18 4135 Order Status:  Completed Specimen:  Thoracentesis Updated:  10/18/18 0234 Special Requests: NO SPECIAL REQUESTS     
  GRAM STAIN 0 TO 10 WBC'S/OIF  
   NO DEFINITE ORGANISM SEEN Culture result: NO GROWTH 2 DAYS     
 AFB CULTURE + SMEAR W/RFLX ID FROM CULTURE [[id number]] Collected:  10/16/18 0951 Order Status:  Completed Specimen:  Miscellaneous sample Updated:  10/17/18 1736 Source THORACENTESIS Comment: RIGHT Corrected on 10/16 AT 0951:  This is a correction to the medical record of the test results previously reported as THORACENTESIS 
  
  
 AFB Specimen processing Concentration Acid Fast Smear NEGATIVE Comment: (NOTE) Performed At: [address] [id number] Issa Bullock MD IL:[id number] Acid Fast Culture PENDING  
 CULTURE, BLOOD [[id number]] Collected:  10/14/18 1300 Order Status:  Canceled Specimen:  Whole Blood Current Meds: 
Current Facility-Administered Medications Medication Dose Route Frequency  albuterol (PROVENTIL VENTOLIN) nebulizer solution 2.5 mg  2.5 mg Nebulization Q6HWA RT  
 metoprolol succinate (TOPROL-XL) XL tablet 25 mg  25 mg Oral QPM  
 spironolactone (ALDACTONE) tablet 50 mg  50 mg Oral BID  lisinopril (PRINIVIL, ZESTRIL) tablet 2.5 mg  2.5 mg Oral DAILY  ceFAZolin (ANCEF) 2 g/20 mL in sterile water IV syringe  2 g IntraVENous Q8H  
 ferrous sulfate tablet 325 mg  1 Tab Oral TID WITH MEALS  
 heparin (porcine) injection 5,000 Units  5,000 Units SubCUTAneous Q12H  
 magnesium oxide (MAG-OX) tablet 400 mg  400 mg Oral TID  methadone (DOLOPHINE) tablet 30 mg  30 mg Oral BID  pantoprazole (PROTONIX) tablet 40 mg  40 mg Oral ACB  polyethylene glycol (MIRALAX) packet 17 g  17 g Oral DAILY  potassium chloride (K-DUR, KLOR-CON) SR tablet 20 mEq  20 mEq Oral DAILY  senna-docusate (PERICOLACE) 8.6-50 mg per tablet 2 Tab  2 Tab Oral QHS  sodium chloride (NS) flush 10 mL  10 mL InterCATHeter Q8H  
 sodium chloride (NS) flush 5-10 mL  5-10 mL IntraVENous Q8H  
 torsemide (DEMADEX) tablet 20 mg  20 mg Oral BID  acetaminophen (TYLENOL) tablet 650 mg  650 mg Oral Q6H PRN  
 lip protectant (BLISTEX) ointment   Topical PRN  
 magnesium hydroxide (MILK OF MAGNESIA) 400 mg/5 mL oral suspension 30 mL  30 mL Oral DAILY PRN  
 ondansetron (ZOFRAN) injection 4 mg  4 mg IntraVENous Q4H PRN  
 sodium chloride (NS) flush 10 mL  10 mL InterCATHeter PRN  
 sodium chloride (NS) flush 5-10 mL  5-10 mL IntraVENous PRN  
 
 
 Other Studies (last 24 hours): Xr Chest [facility] Result Date: 10/29/2018 Portable chest x-ray CLINICAL INDICATION: Pleural effusion, follow-up exam FINDINGS: Single AP view of the chest compared to a similar exam dated 10/26/2018 shows a stable right pleural effusion. There is slight decrease in the airspace opacity at the right lung base. There is a persistent nodular density over the peripheral left midlung with pleural thickening that is unchanged. The cardiac silhouette and mediastinum are stable. IMPRESSION: 1. Stable small right pleural effusion with slight decrease in the airspace opacity at the right lung base. Assessment and Plan:  
 
Hospital Problems as of 10/30/2018 Never Reviewed Codes Class Noted - Resolved POA * (Principal) Endocarditis due to methicillin susceptible Staphylococcus aureus (MSSA) ICD-10-CM: I33.0, B95.61 ICD-9-CM: 421.0, 041.11  10/23/2018 - Present Yes Acute respiratory failure ([facility] ICD-10-CM: J96.00 
ICD-9-CM: 518.81  10/23/2018 - Present Yes Hypoproteinemia ([facility] ICD-10-CM: E77.8 ICD-9-CM: 273.8  10/17/2018 - Present Unknown Pleural effusion ICD-10-CM: J90 ICD-9-CM: 511.9  10/16/2018 - Present Unknown Overview Signed 10/24/2018  9:03 AM by Barbara Peterson MD  
  Loculated on the right Hypomagnesemia ICD-10-CM: S53.36 
ICD-9-CM: 275.2  10/16/2018 - Present Unknown Hypokalemia ICD-10-CM: E87.6 ICD-9-CM: 276.8  10/16/2018 - Present Unknown Sepsis ([facility] ICD-10-CM: A41.9 ICD-9-CM: 038.9, 995.91  10/14/2018 - Present Unknown MSSA bacteremia ICD-10-CM: R78.81 ICD-9-CM: 790.7, 041.11  10/14/2018 - Present Unknown Septic arthritis ([facility] ICD-10-CM: M00.9 ICD-9-CM: 711.00  10/14/2018 - Present Unknown Prolonged Q-T interval on ECG ICD-10-CM: R94.31 
ICD-9-CM: 794.31  10/14/2018 - Present Unknown Transaminitis ICD-10-CM: R74.0 ICD-9-CM: 790.4  10/14/2018 - Present Unknown Hyponatremia ICD-10-CM: E87.1 ICD-9-CM: 276.1  10/14/2018 - Present Unknown Systolic CHF, acute on chronic (HCC) ICD-10-CM: J74.31 ICD-9-CM: 428.23, 428.0  8/30/2018 - Present Yes Heroin abuse ([facility] ICD-10-CM: F11.10 ICD-9-CM: 305.50  4/10/2018 - Present Yes PLAN:    
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 
- Continue Cefazolin - EOT 11/5/18 
  
Active Problems: MSSA bacteremia (10/14/2018) - Due to IVDU 
- Continue Cefazolin - EOT 11/5/18 
  
  Sepsis ([facility] (10/14/2018) - Due to #1 
- Resolved with abx 
  
  Septic arthritis ([facility] (10/14/2018) - Improving 
- Continue Cefazolin 
  
  Systolic CHF, acute on chronic ([facility] (8/30/2018) - EF 10% 
- Continue Demadex 20mg BID 
- Continue Spironolactone 50mg BID 
- Change Toprol BID to QHS 
- Continue Lisinopril 2.5mg 
  
  Acute respiratory failure ([address] (10/23/2018) - Improving with diuresis - S/P thoracentesis by pulm and IR on 10/27/18 
- Continue oxygen- 1 lit/min- try to wean 
 
  
  Prolonged Q-T interval on ECG (10/14/2018) 
  
  Hyponatremia (10/14/2018) - Resolved 
  
  Pleural effusion (10/16/2018) - S/P thoracentesis by pulm and IR on 10/27/18 - Pulmonary signed off 10/30/18 
  
  Hypomagnesemia (10/16/2018) 
resolved 
  
  Hypokalemia (10/16/2018) - Resolved 
  
  Heroin abuse ([address] (4/10/2018) - Continue Methadone 
- CANNOT leave with PICC line 
  
  Transaminitis (10/14/2018) - Resolved 
  
  Hypoproteinemia ([address] (10/17/2018) - Due to drug use and lack of appetite 
  
Dispo - Continue Cefazolin EOT 11/5/18. Had thoracentesis by IR on 10/26/18. 
  
DIET NUTRITIONAL SUPPLEMENTS All Meals; Ensure [employer] DIET REGULAR 2 GM NA (House Low NA) DVT Prophylaxis: Heparin Signed: 
Fuad Barger MD

## 2018-10-30 NOTE — PROGRESS NOTES
Shift assessment complete. A&OX4. Respirations present. Even and unlabored. Lung sounds diminished bilaterally. Apical HR is regular. No s/s of distress. Zero c/o pain at this time. Call light within reach and sitter at bedside. Encouraged patient to call for assistance. Patient verbalizes understanding. See Doc Flowsheet for assessment details. Patient sitting in chair. Door open for observation. Will continue to monitor for any changes.

## 2018-10-30 NOTE — PROGRESS NOTES
Problem: Interdisciplinary Rounds Goal: Interdisciplinary Rounds Interdisciplinary team rounds were held 10/30/2018 with the following team members:Care Management, Nursing, Nutrition, Pharmacy, Physical Therapy and Physician. Plan of care discussed. See clinical pathway and/or care plan for interventions and desired outcomes.

## 2018-10-31 LAB
ANION GAP SERPL CALC-SCNC: 6 MMOL/L
BUN SERPL-MCNC: 19 MG/DL (ref 6–23)
CALCIUM SERPL-MCNC: 8.9 MG/DL (ref 8.3–10.4)
CHLORIDE SERPL-SCNC: 92 MMOL/L (ref 98–107)
CO2 SERPL-SCNC: 36 MMOL/L (ref 21–32)
CREAT SERPL-MCNC: 1.07 MG/DL (ref 0.6–1)
GLUCOSE SERPL-MCNC: 100 MG/DL (ref 65–100)
POTASSIUM SERPL-SCNC: 4.3 MMOL/L (ref 3.5–5.1)
SODIUM SERPL-SCNC: 134 MMOL/L (ref 136–145)

## 2018-10-31 PROCEDURE — 74011250637 HC RX REV CODE- 250/637: Performed by: INTERNAL MEDICINE

## 2018-10-31 PROCEDURE — 94760 N-INVAS EAR/PLS OXIMETRY 1: CPT

## 2018-10-31 PROCEDURE — 74011000250 HC RX REV CODE- 250: Performed by: FAMILY MEDICINE

## 2018-10-31 PROCEDURE — 65270000029 HC RM PRIVATE

## 2018-10-31 PROCEDURE — 94640 AIRWAY INHALATION TREATMENT: CPT

## 2018-10-31 PROCEDURE — 74011250636 HC RX REV CODE- 250/636: Performed by: INTERNAL MEDICINE

## 2018-10-31 PROCEDURE — 80048 BASIC METABOLIC PNL TOTAL CA: CPT

## 2018-10-31 RX ADMIN — POTASSIUM CHLORIDE 20 MEQ: 20 TABLET, EXTENDED RELEASE ORAL at 09:02

## 2018-10-31 RX ADMIN — ALBUTEROL SULFATE 2.5 MG: 2.5 SOLUTION RESPIRATORY (INHALATION) at 20:08

## 2018-10-31 RX ADMIN — METHADONE HYDROCHLORIDE 30 MG: 10 TABLET ORAL at 09:02

## 2018-10-31 RX ADMIN — Medication 10 ML: at 21:55

## 2018-10-31 RX ADMIN — FERROUS SULFATE TAB 325 MG (65 MG ELEMENTAL FE) 325 MG: 325 (65 FE) TAB at 18:01

## 2018-10-31 RX ADMIN — FERROUS SULFATE TAB 325 MG (65 MG ELEMENTAL FE) 325 MG: 325 (65 FE) TAB at 09:02

## 2018-10-31 RX ADMIN — Medication 10 ML: at 17:59

## 2018-10-31 RX ADMIN — Medication 400 MG: at 09:02

## 2018-10-31 RX ADMIN — ONDANSETRON HYDROCHLORIDE 4 MG: 2 INJECTION INTRAMUSCULAR; INTRAVENOUS at 01:33

## 2018-10-31 RX ADMIN — Medication 2 G: at 09:01

## 2018-10-31 RX ADMIN — ONDANSETRON HYDROCHLORIDE 4 MG: 2 INJECTION INTRAMUSCULAR; INTRAVENOUS at 17:52

## 2018-10-31 RX ADMIN — ONDANSETRON HYDROCHLORIDE 4 MG: 2 INJECTION INTRAMUSCULAR; INTRAVENOUS at 09:01

## 2018-10-31 RX ADMIN — Medication 2 G: at 01:33

## 2018-10-31 RX ADMIN — Medication 400 MG: at 18:00

## 2018-10-31 RX ADMIN — HEPARIN SODIUM 5000 UNITS: 5000 INJECTION INTRAVENOUS; SUBCUTANEOUS at 05:19

## 2018-10-31 RX ADMIN — SPIRONOLACTONE 50 MG: 25 TABLET ORAL at 18:01

## 2018-10-31 RX ADMIN — METOPROLOL SUCCINATE 25 MG: 25 TABLET, EXTENDED RELEASE ORAL at 17:59

## 2018-10-31 RX ADMIN — METHADONE HYDROCHLORIDE 30 MG: 10 TABLET ORAL at 17:59

## 2018-10-31 RX ADMIN — FERROUS SULFATE TAB 325 MG (65 MG ELEMENTAL FE) 325 MG: 325 (65 FE) TAB at 13:06

## 2018-10-31 RX ADMIN — PANTOPRAZOLE SODIUM 40 MG: 40 TABLET, DELAYED RELEASE ORAL at 09:01

## 2018-10-31 RX ADMIN — Medication 10 ML: at 05:19

## 2018-10-31 RX ADMIN — TORSEMIDE 20 MG: 20 TABLET ORAL at 18:01

## 2018-10-31 RX ADMIN — TORSEMIDE 20 MG: 20 TABLET ORAL at 09:15

## 2018-10-31 RX ADMIN — SENNOSIDES AND DOCUSATE SODIUM 2 TABLET: 8.6; 5 TABLET ORAL at 21:55

## 2018-10-31 RX ADMIN — SPIRONOLACTONE 50 MG: 25 TABLET ORAL at 09:14

## 2018-10-31 RX ADMIN — Medication 10 ML: at 05:20

## 2018-10-31 RX ADMIN — Medication 400 MG: at 21:55

## 2018-10-31 RX ADMIN — Medication 2 G: at 17:51

## 2018-10-31 RX ADMIN — HEPARIN SODIUM 5000 UNITS: 5000 INJECTION INTRAVENOUS; SUBCUTANEOUS at 17:52

## 2018-10-31 RX ADMIN — ALBUTEROL SULFATE 2.5 MG: 2.5 SOLUTION RESPIRATORY (INHALATION) at 07:39

## 2018-10-31 NOTE — PROGRESS NOTES
Hospitalist Progress Note Admit Date:  10/14/2018 12:05 PM  
Name:  Joseph Liu Age:  28 y.o. 
:  1985 MRN:  [id number] PCP:  None Treatment Team: Attending Provider: Lester Wheat DO; Care Manager: Nicola Pearson Consulting Provider: Best Toussaint MD; Utilization Review: Ofelia England RN; Care Manager: Queen Hope Consulting Provider: Sandhya Reyes; Utilization Review: Bk Contreras, DION; Utilization Review: Iglesia Dudley Subjective:  
From previous notes: \"29 year old female with history of CHF EF 10% diagnosed , IVDU heroin, presented to the ED with worsening shortness of breath pedal edema, with on going heroin use. She had signed out of [facility]   Where she was being treated for MSSA bacteremia/endocarditis, (with suspicion of heroin use via her picc line) with end date at that time 10/14. Since then she was off her CHF medications. And had not received any antibiotics She presented with pain in hip, worsening shortness of breath, and decompensated CHF. She has been started on cefazolin here by ID with EOT . We have diuresed her 10L, and currently is on spironolactone and torsemide with on going good diuresis. \" 
  
10/27 - She feels good this morning. Denies CP/SOB. Still with BLE edema. Had some hypotension overnight but asymptomatic. 
10/28 - She states \"my feet look like a normal human's today\". Denies CP/SOB. Qasim F/C/N/V. 
10/29 - States that she feels good this morning. Denies CP/SOB. Still with mild LE edema. 10/30/18 Sitting in chair on 1 lit/min nasal cannula Chronic tingling sensation left thigh Says doing ok 10/31/18 No complaints Objective:  
 
Patient Vitals for the past 24 hrs: 
 Temp Pulse Resp BP SpO2  
10/31/18 0914  95  112/70   
10/31/18 0740     97 % 10/31/18 0722 98.2 °F (36.8 °C) 88 18 98/64 95 % 10/31/18 0310 97.7 °F (36.5 °C) 91 18 106/67 91 % 10/30/18 2347 97.9 °F (36.6 °C) 86 18 105/64 95 % 10/30/18 2037     100 % 10/30/18 2000 98.2 °F (36.8 °C) 97 18 108/70 93 % 10/30/18 1654 96.1 °F (35.6 °C) (!) 117 16 108/63 97 % 10/30/18 1212 96.4 °F (35.8 °C) 100 20 108/76 95 % Oxygen Therapy O2 Sat (%): 97 % (10/31/18 0740) Pulse via Oximetry: 97 beats per minute (10/31/18 0740) O2 Device: Room air (10/31/18 0740) O2 Flow Rate (L/min): 1 l/min (10/30/18 2037) FIO2 (%): 21 % (10/31/18 0740) Intake/Output Summary (Last 24 hours) at 10/31/2018 1050 Last data filed at 10/31/2018 8756 Gross per 24 hour Intake 340 ml Output 2850 ml Net -2510 ml General:    Well nourished. Alert.   
heent- normal 
CV:   RRR. No murmur, rub, or gallop. Lungs:   Minimal basilar crepitations Abdomen:   Soft, nontender, nondistended. Cns- no focal neurological deficits Extremities: Warm and dry. No cyanosis . mild pedal edema. Skin:     No rashes or jaundice. Data Review: 
I have reviewed all labs, meds, telemetry events, and studies from the last 24 hours. Recent Results (from the past 24 hour(s)) METABOLIC PANEL, BASIC Collection Time: 10/31/18  5:20 AM  
Result Value Ref Range Sodium 134 (L) 136 - 145 mmol/L Potassium 4.3 3.5 - 5.1 mmol/L Chloride 92 (L) 98 - 107 mmol/L  
 CO2 36 (H) 21 - 32 mmol/L Anion gap 6 mmol/L Glucose 100 65 - 100 mg/dL BUN 19 6 - 23 MG/DL Creatinine 1.07 (H) 0.6 - 1.0 MG/DL  
 GFR est AA >60 >60 ml/min/1.73m2 GFR est non-AA >60 ml/min/1.73m2 Calcium 8.9 8.3 - 10.4 MG/DL All Micro Results Procedure Component Value Units Date/Time CULTURE, BODY FLUID Andrea Norton [[id number]] Collected:  10/26/18 0874 Order Status:  Completed Specimen:  Pleural Fluid Updated:  10/28/18 1110 Special Requests: NO SPECIAL REQUESTS     
  GRAM STAIN NO WBCS SEEN     
   NO DEFINITE ORGANISM SEEN Culture result: NO GROWTH 2 DAYS CULTURE, BLOOD [[id number]] Collected:  10/17/18 1321 Order Status:  Completed Specimen:  Blood Updated:  10/22/18 0815 Special Requests: --     
  RIGHT 
HAND Culture result: NO GROWTH 5 DAYS     
 CULTURE, BLOOD [[id number]] Collected:  10/17/18 1319 Order Status:  Completed Specimen:  Blood Updated:  10/22/18 0815 Special Requests: --     
  RIGHT 
HAND Culture result: NO GROWTH 5 DAYS     
 CULTURE, BLOOD [[id number]] Collected:  10/15/18 2233 Order Status:  Completed Specimen:  Blood Updated:  10/20/18 1150 Special Requests: RIGHT ANTECUBITAL Culture result: NO GROWTH 5 DAYS     
 CULTURE, BLOOD [[id number]] Collected:  10/15/18 2235 Order Status:  Completed Specimen:  Blood Updated:  10/20/18 1150 Special Requests: RIGHT ARM Culture result: NO GROWTH 5 DAYS FUNGUS CULTURE AND SMEAR [[id number]] Collected:  10/16/18 4680 Order Status:  Completed Specimen:  Other from Miscellaneous sample Updated:  10/19/18 1138 Source THORACENTESIS Comment: RIGHT Corrected on 10/16 AT 0951: This is a correction to the medical record of the test results previously reported as THORACENTESIS Fungus stain Direct Inoculation Fungus (Mycology) Culture Other source received Comment: (NOTE) Performed At: [address] [id number] Livan Julian MD T CULTURE, BLOOD [[id number]] Collected:  10/14/18 1250 Order Status:  Completed Specimen:  Whole Blood Updated:  10/19/18 0809 Special Requests: --     
  RIGHT 
HAND Culture result: NO GROWTH 5 DAYS     
 CULTURE, BLOOD [[id number]] Collected:  10/14/18 1251 Order Status:  Completed Specimen:  Whole Blood Updated:  10/19/18 0809 Special Requests: --     
  HAND 
LEFT Culture result: NO GROWTH 5 DAYS     
 CULTURE, BODY FLUID W Rafael Or [[id number]] Collected:  10/16/18 9602 Order Status:  Completed Specimen:  Thoracentesis Updated:  10/18/18 7137 Special Requests: NO SPECIAL REQUESTS     
  GRAM STAIN 0 TO 10 WBC'S/OIF  
   NO DEFINITE ORGANISM SEEN Culture result: NO GROWTH 2 DAYS     
 AFB CULTURE + SMEAR W/RFLX ID FROM CULTURE [[id number]] Collected:  10/16/18 0951 Order Status:  Completed Specimen:  Miscellaneous sample Updated:  10/17/18 1736 Source THORACENTESIS Comment: RIGHT Corrected on 10/16 AT 0951: This is a correction to the medical record of the test results previously reported as THORACENTESIS 
  
  
  AFB Specimen processing Concentration Acid Fast Smear NEGATIVE Comment: (NOTE) Performed At: [address] [id number] Hai Cruz MD [id number] Acid Fast Culture PENDING  
 CULTURE, BLOOD [[id number]] Collected:  10/14/18 1300 Order Status:  Canceled Specimen:  Whole Blood Current Meds: 
Current Facility-Administered Medications Medication Dose Route Frequency  albuterol (PROVENTIL VENTOLIN) nebulizer solution 2.5 mg  2.5 mg Nebulization BID RT  
 metoprolol succinate (TOPROL-XL) XL tablet 25 mg  25 mg Oral QPM  
 spironolactone (ALDACTONE) tablet 50 mg  50 mg Oral BID  lisinopril (PRINIVIL, ZESTRIL) tablet 2.5 mg  2.5 mg Oral DAILY  ceFAZolin (ANCEF) 2 g/20 mL in sterile water IV syringe  2 g IntraVENous Q8H  
 ferrous sulfate tablet 325 mg  1 Tab Oral TID WITH MEALS  
 heparin (porcine) injection 5,000 Units  5,000 Units SubCUTAneous Q12H  
 magnesium oxide (MAG-OX) tablet 400 mg  400 mg Oral TID  methadone (DOLOPHINE) tablet 30 mg  30 mg Oral BID  pantoprazole (PROTONIX) tablet 40 mg  40 mg Oral ACB  polyethylene glycol (MIRALAX) packet 17 g  17 g Oral DAILY  potassium chloride (K-DUR, KLOR-CON) SR tablet 20 mEq  20 mEq Oral DAILY  senna-docusate (PERICOLACE) 8.6-50 mg per tablet 2 Tab  2 Tab Oral QHS  sodium chloride (NS) flush 10 mL  10 mL InterCATHeter Q8H  
 sodium chloride (NS) flush 5-10 mL  5-10 mL IntraVENous Q8H  
 torsemide (DEMADEX) tablet 20 mg  20 mg Oral BID  acetaminophen (TYLENOL) tablet 650 mg  650 mg Oral Q6H PRN  
 lip protectant (BLISTEX) ointment   Topical PRN  
 magnesium hydroxide (MILK OF MAGNESIA) 400 mg/5 mL oral suspension 30 mL  30 mL Oral DAILY PRN  
 ondansetron (ZOFRAN) injection 4 mg  4 mg IntraVENous Q4H PRN  
 sodium chloride (NS) flush 10 mL  10 mL InterCATHeter PRN  
 sodium chloride (NS) flush 5-10 mL  5-10 mL IntraVENous PRN Other Studies (last 24 hours): No results found. Assessment and Plan:  
 
Hospital Problems as of 10/31/2018 Never Reviewed Codes Class Noted - Resolved POA * (Principal) Endocarditis due to methicillin susceptible Staphylococcus aureus (MSSA) ICD-10-CM: I33.0, B95.61 ICD-9-CM: 421.0, 041.11  10/23/2018 - Present Yes Acute respiratory failure ([facility] ICD-10-CM: J96.00 
ICD-9-CM: 518.81  10/23/2018 - Present Yes Hypoproteinemia ([facility] ICD-10-CM: E77.8 ICD-9-CM: 273.8  10/17/2018 - Present Unknown Pleural effusion ICD-10-CM: J90 ICD-9-CM: 511.9  10/16/2018 - Present Unknown Overview Signed 10/24/2018  9:03 AM by Leon Lehman MD  
  Loculated on the right Hypomagnesemia ICD-10-CM: J22.97 
ICD-9-CM: 275.2  10/16/2018 - Present Unknown Hypokalemia ICD-10-CM: E87.6 ICD-9-CM: 276.8  10/16/2018 - Present Unknown Sepsis ([facility] ICD-10-CM: A41.9 ICD-9-CM: 038.9, 995.91  10/14/2018 - Present Unknown MSSA bacteremia ICD-10-CM: R78.81 ICD-9-CM: 790.7, 041.11  10/14/2018 - Present Unknown Septic arthritis ([facility] ICD-10-CM: M00.9 ICD-9-CM: 711.00  10/14/2018 - Present Unknown Prolonged Q-T interval on ECG ICD-10-CM: R94.31 
ICD-9-CM: 794.31  10/14/2018 - Present Unknown Transaminitis ICD-10-CM: R74.0 ICD-9-CM: 790.4  10/14/2018 - Present Unknown Hyponatremia ICD-10-CM: E87.1 ICD-9-CM: 276.1  10/14/2018 - Present Unknown Systolic CHF, acute on chronic (HCC) ICD-10-CM: H98.18 ICD-9-CM: 428.23, 428.0  8/30/2018 - Present Yes Heroin abuse ([facility] ICD-10-CM: F11.10 ICD-9-CM: 305.50  4/10/2018 - Present Yes PLAN:    
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 
- Continue Cefazolin - EOT 11/5/18 
  
Active Problems: MSSA bacteremia (10/14/2018) - Due to IVDU 
- Continue Cefazolin - EOT 11/5/18 
  
  Sepsis ([facility] (10/14/2018) - Due to #1 
- Resolved with abx 
  
  Septic arthritis ([facility] (10/14/2018) - Improving 
- Continue Cefazolin 
  
  Systolic CHF, acute on chronic ([facility] (8/30/2018) - EF 10% 
- Continue Demadex 20mg BID 
- Continue Spironolactone 50mg BID 
- Change Toprol BID to QHS 
- Continue Lisinopril 2.5mg 
  
  Acute respiratory failure ([address] (10/23/2018) - Improving with diuresis - S/P thoracentesis by pulm and IR on 10/27/18 
- Continue oxygen- 1 lit/min- try to wean 
 
  
  Prolonged Q-T interval on ECG (10/14/2018) 
  
  Hyponatremia (10/14/2018) - Resolved 
  
  Pleural effusion (10/16/2018) - S/P thoracentesis by pulm and IR on 10/27/18 - Pulmonary signed off 10/30/18 
  
  Hypomagnesemia (10/16/2018) 
resolved 
  
  Hypokalemia (10/16/2018) - Resolved 
  
  Heroin abuse ([address] (4/10/2018) - Continue Methadone 
- CANNOT leave with PICC line 
  
  Transaminitis (10/14/2018) - Resolved 
  
  Hypoproteinemia ([address] (10/17/2018) - Due to drug use and lack of appetite 
  
Dispo - Continue Cefazolin EOT 11/5/18. Had thoracentesis by IR on 10/26/18. 
  
DIET NUTRITIONAL SUPPLEMENTS All Meals; Ensure [employer] DIET REGULAR 2 GM NA (House Low NA) DVT Prophylaxis: Heparin Signed: 
Apple Ware MD

## 2018-10-31 NOTE — PROGRESS NOTES
PM assessment complete. A/O x4. Respirations present, non-labored. Midline catheter intact. +1 edema to BLE. Voiding at intervals. Aware to call if needs arise.

## 2018-10-31 NOTE — PROGRESS NOTES
Assessment completed and documented. Lung sounds clear. Murmur noted. Abdomen soft. Bowel sounds active. Trace edema to BLE. No complaints of pain or nausea. Currently sitting up in chair with family and sitter at bedside. Will continue to monitor with hourly rounding.

## 2018-10-31 NOTE — PROGRESS NOTES
Problem: Interdisciplinary Rounds Goal: Interdisciplinary Rounds Interdisciplinary team rounds were held 10/31/2018 with the following team members:Care Management, Nursing, Nutrition, Pharmacy, Physical Therapy and Physician. Plan of care discussed. See clinical pathway and/or care plan for interventions and desired outcomes.

## 2018-10-31 NOTE — PROGRESS NOTES
Saw pt in interdisciplinarily rounds, plan of care and discharge date/ location discussed. Per the hospitalitis plan is to d/c at end of iv abx, which is 11/6.

## 2018-11-01 LAB
ANION GAP SERPL CALC-SCNC: 9 MMOL/L
BASOPHILS # BLD: 0.1 K/UL (ref 0–0.2)
BASOPHILS NFR BLD: 1 % (ref 0–2)
BUN SERPL-MCNC: 23 MG/DL (ref 6–23)
CALCIUM SERPL-MCNC: 9.5 MG/DL (ref 8.3–10.4)
CHLORIDE SERPL-SCNC: 92 MMOL/L (ref 98–107)
CO2 SERPL-SCNC: 33 MMOL/L (ref 21–32)
CREAT SERPL-MCNC: 1.07 MG/DL (ref 0.6–1)
DIFFERENTIAL METHOD BLD: ABNORMAL
EOSINOPHIL # BLD: 0.1 K/UL (ref 0–0.8)
EOSINOPHIL NFR BLD: 2 % (ref 0.5–7.8)
ERYTHROCYTE [DISTWIDTH] IN BLOOD BY AUTOMATED COUNT: 22.8 %
GLUCOSE SERPL-MCNC: 91 MG/DL (ref 65–100)
HCT VFR BLD AUTO: 33.3 % (ref 35.8–46.3)
HGB BLD-MCNC: 9.6 G/DL (ref 11.7–15.4)
IMM GRANULOCYTES # BLD: 0 K/UL (ref 0–0.5)
IMM GRANULOCYTES NFR BLD AUTO: 0 % (ref 0–5)
LYMPHOCYTES # BLD: 2 K/UL (ref 0.5–4.6)
LYMPHOCYTES NFR BLD: 30 % (ref 13–44)
MCH RBC QN AUTO: 26.7 PG (ref 26.1–32.9)
MCHC RBC AUTO-ENTMCNC: 28.8 G/DL (ref 31.4–35)
MCV RBC AUTO: 92.8 FL (ref 79.6–97.8)
MONOCYTES # BLD: 1 K/UL (ref 0.1–1.3)
MONOCYTES NFR BLD: 15 % (ref 4–12)
NEUTS SEG # BLD: 3.4 K/UL (ref 1.7–8.2)
NEUTS SEG NFR BLD: 53 % (ref 43–78)
NRBC # BLD: 0 K/UL (ref 0–0.2)
PLATELET # BLD AUTO: 355 K/UL (ref 150–450)
PMV BLD AUTO: 8.8 FL (ref 9.4–12.3)
POTASSIUM SERPL-SCNC: 4.4 MMOL/L (ref 3.5–5.1)
RBC # BLD AUTO: 3.59 M/UL (ref 4.05–5.2)
SODIUM SERPL-SCNC: 134 MMOL/L (ref 136–145)
WBC # BLD AUTO: 6.5 K/UL (ref 4.3–11.1)

## 2018-11-01 PROCEDURE — 80048 BASIC METABOLIC PNL TOTAL CA: CPT

## 2018-11-01 PROCEDURE — 36415 COLL VENOUS BLD VENIPUNCTURE: CPT

## 2018-11-01 PROCEDURE — 74011000250 HC RX REV CODE- 250: Performed by: FAMILY MEDICINE

## 2018-11-01 PROCEDURE — 85025 COMPLETE CBC W/AUTO DIFF WBC: CPT

## 2018-11-01 PROCEDURE — 77010033678 HC OXYGEN DAILY

## 2018-11-01 PROCEDURE — 74011250636 HC RX REV CODE- 250/636: Performed by: INTERNAL MEDICINE

## 2018-11-01 PROCEDURE — 74011250637 HC RX REV CODE- 250/637: Performed by: INTERNAL MEDICINE

## 2018-11-01 PROCEDURE — 94640 AIRWAY INHALATION TREATMENT: CPT

## 2018-11-01 PROCEDURE — 94760 N-INVAS EAR/PLS OXIMETRY 1: CPT

## 2018-11-01 PROCEDURE — 65270000029 HC RM PRIVATE

## 2018-11-01 RX ADMIN — HEPARIN SODIUM 5000 UNITS: 5000 INJECTION INTRAVENOUS; SUBCUTANEOUS at 17:30

## 2018-11-01 RX ADMIN — Medication 2 G: at 17:30

## 2018-11-01 RX ADMIN — FERROUS SULFATE TAB 325 MG (65 MG ELEMENTAL FE) 325 MG: 325 (65 FE) TAB at 08:13

## 2018-11-01 RX ADMIN — METHADONE HYDROCHLORIDE 30 MG: 10 TABLET ORAL at 08:12

## 2018-11-01 RX ADMIN — HEPARIN SODIUM 5000 UNITS: 5000 INJECTION INTRAVENOUS; SUBCUTANEOUS at 05:30

## 2018-11-01 RX ADMIN — FERROUS SULFATE TAB 325 MG (65 MG ELEMENTAL FE) 325 MG: 325 (65 FE) TAB at 12:14

## 2018-11-01 RX ADMIN — ONDANSETRON HYDROCHLORIDE 4 MG: 2 INJECTION INTRAMUSCULAR; INTRAVENOUS at 17:30

## 2018-11-01 RX ADMIN — Medication 400 MG: at 17:30

## 2018-11-01 RX ADMIN — ONDANSETRON HYDROCHLORIDE 4 MG: 2 INJECTION INTRAMUSCULAR; INTRAVENOUS at 08:11

## 2018-11-01 RX ADMIN — Medication 2 G: at 00:45

## 2018-11-01 RX ADMIN — METHADONE HYDROCHLORIDE 30 MG: 10 TABLET ORAL at 17:30

## 2018-11-01 RX ADMIN — ALBUTEROL SULFATE 2.5 MG: 2.5 SOLUTION RESPIRATORY (INHALATION) at 08:00

## 2018-11-01 RX ADMIN — Medication 400 MG: at 21:52

## 2018-11-01 RX ADMIN — Medication 400 MG: at 08:12

## 2018-11-01 RX ADMIN — SENNOSIDES AND DOCUSATE SODIUM 2 TABLET: 8.6; 5 TABLET ORAL at 21:52

## 2018-11-01 RX ADMIN — METOPROLOL SUCCINATE 25 MG: 25 TABLET, EXTENDED RELEASE ORAL at 17:30

## 2018-11-01 RX ADMIN — TORSEMIDE 20 MG: 20 TABLET ORAL at 08:28

## 2018-11-01 RX ADMIN — FERROUS SULFATE TAB 325 MG (65 MG ELEMENTAL FE) 325 MG: 325 (65 FE) TAB at 17:30

## 2018-11-01 RX ADMIN — PANTOPRAZOLE SODIUM 40 MG: 40 TABLET, DELAYED RELEASE ORAL at 08:13

## 2018-11-01 RX ADMIN — LISINOPRIL 2.5 MG: 5 TABLET ORAL at 08:27

## 2018-11-01 RX ADMIN — Medication 10 ML: at 17:31

## 2018-11-01 RX ADMIN — Medication 10 ML: at 05:31

## 2018-11-01 RX ADMIN — POTASSIUM CHLORIDE 20 MEQ: 20 TABLET, EXTENDED RELEASE ORAL at 08:12

## 2018-11-01 RX ADMIN — ONDANSETRON HYDROCHLORIDE 4 MG: 2 INJECTION INTRAMUSCULAR; INTRAVENOUS at 00:45

## 2018-11-01 RX ADMIN — SPIRONOLACTONE 50 MG: 25 TABLET ORAL at 08:27

## 2018-11-01 RX ADMIN — TORSEMIDE 20 MG: 20 TABLET ORAL at 17:30

## 2018-11-01 RX ADMIN — Medication 10 ML: at 21:52

## 2018-11-01 RX ADMIN — ALBUTEROL SULFATE 2.5 MG: 2.5 SOLUTION RESPIRATORY (INHALATION) at 20:35

## 2018-11-01 RX ADMIN — SPIRONOLACTONE 50 MG: 25 TABLET ORAL at 17:30

## 2018-11-01 RX ADMIN — Medication 2 G: at 08:11

## 2018-11-01 NOTE — PROGRESS NOTES
PM assessment complete. A/O x4. Respirations present, non-labored. Right midline catheter intact. Voiding at intervals. BLE +1 edema. Resting in bed. Aware to call if needs arise.

## 2018-11-01 NOTE — PROGRESS NOTES
Problem: Interdisciplinary Rounds Goal: Interdisciplinary Rounds Interdisciplinary team rounds were held 11/1/2018 with the following team members:Care Management, Nursing, Nutrition, Pharmacy, Physical Therapy and Physician and the patient. Plan of care discussed. See clinical pathway and/or care plan for interventions and desired outcomes.

## 2018-11-01 NOTE — PROGRESS NOTES
Hospitalist Progress Note Admit Date:  10/14/2018 12:05 PM  
Name:  Javy Cruz Age:  28 y.o. 
:  1985 MRN:  [id number] PCP:  None Treatment Team: Attending Provider: Brianne Miranda DO; Care Manager: Mishel Hazel Consulting Provider: Margarita Lombardo MD; Utilization Review: Bhavani Yoo RN; Care Manager: Barbara Mcneill Consulting Provider: Keily Walters; Utilization Review: Modesto Nguyen Subjective:  
From previous notes: \"29 year old female with history of CHF EF 10% diagnosed , IVDU heroin, presented to the ED with worsening shortness of breath pedal edema, with on going heroin use. She had signed out of [facility]   Where she was being treated for MSSA bacteremia/endocarditis, (with suspicion of heroin use via her picc line) with end date at that time 10/14. Since then she was off her CHF medications. And had not received any antibiotics She presented with pain in hip, worsening shortness of breath, and decompensated CHF. She has been started on cefazolin here by ID with EOT . We have diuresed her 10L, and currently is on spironolactone and torsemide with on going good diuresis. \" 
  
10/27 - She feels good this morning. Denies CP/SOB. Still with BLE edema. Had some hypotension overnight but asymptomatic. 
10/28 - She states \"my feet look like a normal human's today\". Denies CP/SOB. Lynne Valverde F/C/N/V. 
10/29 - States that she feels good this morning. Denies CP/SOB. Still with mild LE edema. 10/30/18 Sitting in chair on 1 lit/min nasal cannula Chronic tingling sensation left thigh Says doing ok 10/31/18 No complaints 18 Says doing fine No complaints Objective:  
 
Patient Vitals for the past 24 hrs: 
 Temp Pulse Resp BP SpO2  
18 0800 99 °F (37.2 °C) 94 18 112/72 94 % 18 0347 98.3 °F (36.8 °C) 88 16 97/64 97 % 18 0013 98.1 °F (36.7 °C) 88 14 108/73 98 % 10/31/18 2012 99 °F (37.2 °C) (!) 105 16 105/66 100 % 10/31/18 2010     97 % 10/31/18 1759  100  112/68   
10/31/18 1546 97.8 °F (36.6 °C) (!) 107 18 104/67 98 % 10/31/18 1300 97.3 °F (36.3 °C) (!) 113 18 118/81 97 % Oxygen Therapy O2 Sat (%): 94 % (11/01/18 0800) Pulse via Oximetry: 90 beats per minute (11/01/18 0800) O2 Device: Room air (11/01/18 0800) O2 Flow Rate (L/min): 0 l/min (11/01/18 0800) FIO2 (%): 21 % (11/01/18 0800) Intake/Output Summary (Last 24 hours) at 11/1/2018 1017 Last data filed at 11/1/2018 6192 Gross per 24 hour Intake 1075 ml Output 4400 ml Net -3325 ml General:    Well nourished. Alert.   
heent- normal 
CV:   RRR. No murmur, rub, or gallop. Lungs:   Minimal basilar crepitations Abdomen:   Soft, nontender, nondistended. Cns- no focal neurological deficits Extremities: Warm and dry. No cyanosis . mild pedal edema. Skin:     No rashes or jaundice. Data Review: 
I have reviewed all labs, meds, telemetry events, and studies from the last 24 hours. Recent Results (from the past 24 hour(s)) CBC WITH AUTOMATED DIFF Collection Time: 11/01/18  6:59 AM  
Result Value Ref Range WBC 6.5 4.3 - 11.1 K/uL  
 RBC 3.59 (L) 4.05 - 5.2 M/uL HGB 9.6 (L) 11.7 - 15.4 g/dL HCT 33.3 (L) 35.8 - 46.3 % MCV 92.8 79.6 - 97.8 FL  
 MCH 26.7 26.1 - 32.9 PG  
 MCHC 28.8 (L) 31.4 - 35.0 g/dL RDW 22.8 % PLATELET 039 052 - 272 K/uL MPV 8.8 (L) 9.4 - 12.3 FL ABSOLUTE NRBC 0.00 0.0 - 0.2 K/uL  
 DF AUTOMATED NEUTROPHILS 53 43 - 78 % LYMPHOCYTES 30 13 - 44 % MONOCYTES 15 (H) 4.0 - 12.0 % EOSINOPHILS 2 0.5 - 7.8 % BASOPHILS 1 0.0 - 2.0 % IMMATURE GRANULOCYTES 0 0.0 - 5.0 %  
 ABS. NEUTROPHILS 3.4 1.7 - 8.2 K/UL  
 ABS. LYMPHOCYTES 2.0 0.5 - 4.6 K/UL  
 ABS. MONOCYTES 1.0 0.1 - 1.3 K/UL  
 ABS. EOSINOPHILS 0.1 0.0 - 0.8 K/UL  
 ABS. BASOPHILS 0.1 0.0 - 0.2 K/UL  
 ABS. IMM. GRANS. 0.0 0.0 - 0.5 K/UL METABOLIC PANEL, BASIC Collection Time: 11/01/18  6:59 AM  
Result Value Ref Range Sodium 134 (L) 136 - 145 mmol/L Potassium 4.4 3.5 - 5.1 mmol/L Chloride 92 (L) 98 - 107 mmol/L  
 CO2 33 (H) 21 - 32 mmol/L Anion gap 9 mmol/L Glucose 91 65 - 100 mg/dL BUN 23 6 - 23 MG/DL Creatinine 1.07 (H) 0.6 - 1.0 MG/DL  
 GFR est AA >60 >60 ml/min/1.73m2 GFR est non-AA >60 ml/min/1.73m2 Calcium 9.5 8.3 - 10.4 MG/DL All Micro Results Procedure Component Value Units Date/Time CULTURE, BODY FLUID Татьяна Soriano [[id number]] Collected:  10/26/18 0830 Order Status:  Completed Specimen:  Pleural Fluid Updated:  10/28/18 7852 Special Requests: NO SPECIAL REQUESTS     
  GRAM STAIN NO WBCS SEEN     
   NO DEFINITE ORGANISM SEEN Culture result: NO GROWTH 2 DAYS     
 CULTURE, BLOOD [[id number]] Collected:  10/17/18 1321 Order Status:  Completed Specimen:  Blood Updated:  10/22/18 0815 Special Requests: --     
  RIGHT 
HAND Culture result: NO GROWTH 5 DAYS     
 CULTURE, BLOOD [[id number]] Collected:  10/17/18 1319 Order Status:  Completed Specimen:  Blood Updated:  10/22/18 0815 Special Requests: --     
  RIGHT 
HAND Culture result: NO GROWTH 5 DAYS     
 CULTURE, BLOOD [[id number]] Collected:  10/15/18 2233 Order Status:  Completed Specimen:  Blood Updated:  10/20/18 1150 Special Requests: RIGHT ANTECUBITAL Culture result: NO GROWTH 5 DAYS     
 CULTURE, BLOOD [[id number]] Collected:  10/15/18 2235 Order Status:  Completed Specimen:  Blood Updated:  10/20/18 1150 Special Requests: RIGHT ARM Culture result: NO GROWTH 5 DAYS FUNGUS CULTURE AND SMEAR [[id number]] Collected:  10/16/18 1523 Order Status:  Completed Specimen:  Other from Miscellaneous sample Updated:  10/19/18 1138 Source THORACENTESIS Comment: RIGHT Corrected on 10/16 AT 0951:  This is a correction to the medical record of the test results previously reported as THORACENTESIS Fungus stain Direct Inoculation Fungus (Mycology) Culture Other source received Comment: (NOTE) Performed At: [address] [id number] Moise Portillo MD [id number] CULTURE, BLOOD [[id number]] Collected:  10/14/18 1250 Order Status:  Completed Specimen:  Whole Blood Updated:  10/19/18 0809 Special Requests: --     
  RIGHT 
HAND Culture result: NO GROWTH 5 DAYS     
 CULTURE, BLOOD [[id number]] Collected:  10/14/18 1251 Order Status:  Completed Specimen:  Whole Blood Updated:  10/19/18 0809 Special Requests: --     
  HAND 
LEFT Culture result: NO GROWTH 5 DAYS     
 CULTURE, BODY FLUID W Afshan Beth [[id number]] Collected:  10/16/18 3687 Order Status:  Completed Specimen:  Thoracentesis Updated:  10/18/18 6013 Special Requests: NO SPECIAL REQUESTS     
  GRAM STAIN 0 TO 10 WBC'S/OIF  
   NO DEFINITE ORGANISM SEEN Culture result: NO GROWTH 2 DAYS     
 AFB CULTURE + SMEAR W/RFLX ID FROM CULTURE [[id number]] Collected:  10/16/18 0951 Order Status:  Completed Specimen:  Miscellaneous sample Updated:  10/17/18 1736 Source THORACENTESIS Comment: RIGHT Corrected on 10/16 AT 0951: This is a correction to the medical record of the test results previously reported as THORACENTESIS 
  
  
  AFB Specimen processing Concentration Acid Fast Smear NEGATIVE Comment: (NOTE) Performed At: [address] [id number] Moise Portillo MD [id number] Acid Fast Culture PENDING  
 CULTURE, BLOOD [[id number]] Collected:  10/14/18 1300 Order Status:  Canceled Specimen:  Whole Blood Current Meds: 
Current Facility-Administered Medications Medication Dose Route Frequency  albuterol (PROVENTIL VENTOLIN) nebulizer solution 2.5 mg  2.5 mg Nebulization BID RT  
  metoprolol succinate (TOPROL-XL) XL tablet 25 mg  25 mg Oral QPM  
 spironolactone (ALDACTONE) tablet 50 mg  50 mg Oral BID  lisinopril (PRINIVIL, ZESTRIL) tablet 2.5 mg  2.5 mg Oral DAILY  ceFAZolin (ANCEF) 2 g/20 mL in sterile water IV syringe  2 g IntraVENous Q8H  
 ferrous sulfate tablet 325 mg  1 Tab Oral TID WITH MEALS  
 heparin (porcine) injection 5,000 Units  5,000 Units SubCUTAneous Q12H  
 magnesium oxide (MAG-OX) tablet 400 mg  400 mg Oral TID  methadone (DOLOPHINE) tablet 30 mg  30 mg Oral BID  pantoprazole (PROTONIX) tablet 40 mg  40 mg Oral ACB  polyethylene glycol (MIRALAX) packet 17 g  17 g Oral DAILY  potassium chloride (K-DUR, KLOR-CON) SR tablet 20 mEq  20 mEq Oral DAILY  senna-docusate (PERICOLACE) 8.6-50 mg per tablet 2 Tab  2 Tab Oral QHS  sodium chloride (NS) flush 10 mL  10 mL InterCATHeter Q8H  
 sodium chloride (NS) flush 5-10 mL  5-10 mL IntraVENous Q8H  
 torsemide (DEMADEX) tablet 20 mg  20 mg Oral BID  acetaminophen (TYLENOL) tablet 650 mg  650 mg Oral Q6H PRN  
 lip protectant (BLISTEX) ointment   Topical PRN  
 magnesium hydroxide (MILK OF MAGNESIA) 400 mg/5 mL oral suspension 30 mL  30 mL Oral DAILY PRN  
 ondansetron (ZOFRAN) injection 4 mg  4 mg IntraVENous Q4H PRN  
 sodium chloride (NS) flush 10 mL  10 mL InterCATHeter PRN  
 sodium chloride (NS) flush 5-10 mL  5-10 mL IntraVENous PRN Other Studies (last 24 hours): No results found. Assessment and Plan:  
 
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 
ICD-9-CM: 518.81  10/23/2018 - Present Yes Hypoproteinemia ([facility] ICD-10-CM: E77.8 ICD-9-CM: 273.8  10/17/2018 - Present Unknown Pleural effusion ICD-10-CM: J90 ICD-9-CM: 511.9  10/16/2018 - Present Unknown Overview Signed 10/24/2018  9:03 AM by Rocio Raines MD  
  Loculated on the right Hypomagnesemia ICD-10-CM: N03.57 
ICD-9-CM: 275.2  10/16/2018 - Present Unknown Hypokalemia ICD-10-CM: E87.6 ICD-9-CM: 276.8  10/16/2018 - Present Unknown Sepsis ([facility] ICD-10-CM: A41.9 ICD-9-CM: 038.9, 995.91  10/14/2018 - Present Unknown MSSA bacteremia ICD-10-CM: R78.81 ICD-9-CM: 790.7, 041.11  10/14/2018 - Present Unknown Septic arthritis ([facility] ICD-10-CM: M00.9 ICD-9-CM: 711.00  10/14/2018 - Present Unknown Prolonged Q-T interval on ECG ICD-10-CM: R94.31 
ICD-9-CM: 794.31  10/14/2018 - Present Unknown Transaminitis ICD-10-CM: R74.0 ICD-9-CM: 790.4  10/14/2018 - Present Unknown Hyponatremia ICD-10-CM: E87.1 ICD-9-CM: 276.1  10/14/2018 - Present Unknown Systolic CHF, acute on chronic (HCC) ICD-10-CM: O53.83 ICD-9-CM: 428.23, 428.0  8/30/2018 - Present Yes Heroin abuse ([facility] ICD-10-CM: F11.10 ICD-9-CM: 305.50  4/10/2018 - Present Yes PLAN:    
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 
- Continue Cefazolin - EOT 11/5/18 
  
Active Problems: MSSA bacteremia (10/14/2018) - Due to IVDU 
- Continue Cefazolin - EOT 11/5/18 
  
  Sepsis ([facility] (10/14/2018) - Due to #1 
- Resolved with abx 
  
  Septic arthritis ([facility] (10/14/2018) - Improving 
- Continue Cefazolin 
  
  Systolic CHF, acute on chronic ([facility] (8/30/2018) - EF 10% 
- Continue Demadex 20mg BID 
- Continue Spironolactone 50mg BID 
- Change Toprol BID to QHS 
- Continue Lisinopril 2.5mg 
  
  Acute respiratory failure ([facility] (10/23/2018) - Improving with diuresis - S/P thoracentesis by pulm and IR on 10/27/18 
- Continue oxygen- 1 lit/min- try to wean 
 
  
  Prolonged Q-T interval on ECG (10/14/2018) 
  
  Hyponatremia (10/14/2018) - Resolved 
  
  Pleural effusion (10/16/2018) - S/P thoracentesis by pulm and IR on 10/27/18 - Pulmonary signed off 10/30/18 
  
  Hypomagnesemia (10/16/2018) 
resolved 
  
  Hypokalemia (10/16/2018) - Resolved 
  
  Heroin abuse ([facility] (4/10/2018) - Continue Methadone 
- CANNOT leave with PICC line 
  
  Transaminitis (10/14/2018) - Resolved 
  
  Hypoproteinemia ([facility] (10/17/2018) - Due to drug use and lack of appetite 
  
Dispo - Continue Cefazolin EOT 11/5/18. Had thoracentesis by IR on 10/26/18. 
  
DIET NUTRITIONAL SUPPLEMENTS All Meals; Ensure [employer] DIET REGULAR 2 GM NA (House Low NA) DVT Prophylaxis: Heparin Signed: 
Zoraida Montaño MD

## 2018-11-01 NOTE — PROGRESS NOTES
Assessment completed and documented. Lung sounds clear. Lower right lung sounds diminished. Murmur noted. Abdomen soft. Bowel sounds active. No complaints of pain. Currently up in chair. Will continue to monitor with hourly rounding.

## 2018-11-02 PROCEDURE — 94640 AIRWAY INHALATION TREATMENT: CPT

## 2018-11-02 PROCEDURE — 74011250637 HC RX REV CODE- 250/637: Performed by: INTERNAL MEDICINE

## 2018-11-02 PROCEDURE — 65270000029 HC RM PRIVATE

## 2018-11-02 PROCEDURE — 74011000250 HC RX REV CODE- 250: Performed by: FAMILY MEDICINE

## 2018-11-02 PROCEDURE — 94760 N-INVAS EAR/PLS OXIMETRY 1: CPT

## 2018-11-02 PROCEDURE — 74011250636 HC RX REV CODE- 250/636: Performed by: INTERNAL MEDICINE

## 2018-11-02 RX ADMIN — POTASSIUM CHLORIDE 20 MEQ: 20 TABLET, EXTENDED RELEASE ORAL at 08:39

## 2018-11-02 RX ADMIN — Medication 400 MG: at 21:14

## 2018-11-02 RX ADMIN — Medication 10 ML: at 21:14

## 2018-11-02 RX ADMIN — Medication 10 ML: at 22:00

## 2018-11-02 RX ADMIN — Medication 400 MG: at 08:39

## 2018-11-02 RX ADMIN — Medication 10 ML: at 01:35

## 2018-11-02 RX ADMIN — SENNOSIDES AND DOCUSATE SODIUM 2 TABLET: 8.6; 5 TABLET ORAL at 21:14

## 2018-11-02 RX ADMIN — Medication 10 ML: at 17:45

## 2018-11-02 RX ADMIN — METHADONE HYDROCHLORIDE 30 MG: 10 TABLET ORAL at 17:50

## 2018-11-02 RX ADMIN — ONDANSETRON HYDROCHLORIDE 4 MG: 2 INJECTION INTRAMUSCULAR; INTRAVENOUS at 17:44

## 2018-11-02 RX ADMIN — Medication 400 MG: at 17:51

## 2018-11-02 RX ADMIN — SPIRONOLACTONE 50 MG: 25 TABLET ORAL at 08:38

## 2018-11-02 RX ADMIN — Medication 2 G: at 17:44

## 2018-11-02 RX ADMIN — Medication 10 ML: at 06:18

## 2018-11-02 RX ADMIN — ONDANSETRON HYDROCHLORIDE 4 MG: 2 INJECTION INTRAMUSCULAR; INTRAVENOUS at 08:32

## 2018-11-02 RX ADMIN — FERROUS SULFATE TAB 325 MG (65 MG ELEMENTAL FE) 325 MG: 325 (65 FE) TAB at 08:42

## 2018-11-02 RX ADMIN — TORSEMIDE 20 MG: 20 TABLET ORAL at 17:51

## 2018-11-02 RX ADMIN — LISINOPRIL 2.5 MG: 5 TABLET ORAL at 08:39

## 2018-11-02 RX ADMIN — FERROUS SULFATE TAB 325 MG (65 MG ELEMENTAL FE) 325 MG: 325 (65 FE) TAB at 14:06

## 2018-11-02 RX ADMIN — ONDANSETRON HYDROCHLORIDE 4 MG: 2 INJECTION INTRAMUSCULAR; INTRAVENOUS at 01:39

## 2018-11-02 RX ADMIN — Medication 2 G: at 08:33

## 2018-11-02 RX ADMIN — ALBUTEROL SULFATE 2.5 MG: 2.5 SOLUTION RESPIRATORY (INHALATION) at 07:39

## 2018-11-02 RX ADMIN — METOPROLOL SUCCINATE 25 MG: 25 TABLET, EXTENDED RELEASE ORAL at 17:50

## 2018-11-02 RX ADMIN — METHADONE HYDROCHLORIDE 30 MG: 10 TABLET ORAL at 08:37

## 2018-11-02 RX ADMIN — Medication 10 ML: at 08:33

## 2018-11-02 RX ADMIN — Medication 2 G: at 01:35

## 2018-11-02 RX ADMIN — TORSEMIDE 20 MG: 20 TABLET ORAL at 08:39

## 2018-11-02 RX ADMIN — PANTOPRAZOLE SODIUM 40 MG: 40 TABLET, DELAYED RELEASE ORAL at 08:42

## 2018-11-02 RX ADMIN — HEPARIN SODIUM 5000 UNITS: 5000 INJECTION INTRAVENOUS; SUBCUTANEOUS at 17:49

## 2018-11-02 RX ADMIN — ALBUTEROL SULFATE 2.5 MG: 2.5 SOLUTION RESPIRATORY (INHALATION) at 20:04

## 2018-11-02 RX ADMIN — FERROUS SULFATE TAB 325 MG (65 MG ELEMENTAL FE) 325 MG: 325 (65 FE) TAB at 17:51

## 2018-11-02 RX ADMIN — SPIRONOLACTONE 50 MG: 25 TABLET ORAL at 17:43

## 2018-11-02 RX ADMIN — HEPARIN SODIUM 5000 UNITS: 5000 INJECTION INTRAVENOUS; SUBCUTANEOUS at 05:00

## 2018-11-02 NOTE — PROGRESS NOTES
Hospitalist Progress Note Admit Date:  10/14/2018 12:05 PM  
Name:  Buzz Groves Age:  28 y.o. 
:  1985 MRN:  [id number] PCP:  None Treatment Team: Attending Provider: Gabbie Meyer DO; Care Manager: Mary Phillips Consulting Provider: Guzman Marin MD; Utilization Review: Aline Brunson RN; Care Manager: Mabel Barthel Consulting Provider: Courtney Martin; Utilization Review: Austin Roche Subjective:  
From previous notes: \"29 year old female with history of CHF EF 10% diagnosed , IVDU heroin, presented to the ED with worsening shortness of breath pedal edema, with on going heroin use. She had signed out of [facility] [address]   Where she was being treated for MSSA bacteremia/endocarditis, (with suspicion of heroin use via her picc line) with end date at that time 10/14. Since then she was off her CHF medications. And had not received any antibiotics She presented with pain in hip, worsening shortness of breath, and decompensated CHF. She has been started on cefazolin here by ID with EOT . We have diuresed her 10L, and currently is on spironolactone and torsemide with on going good diuresis. \" 
  
10/27 - She feels good this morning. Denies CP/SOB. Still with BLE edema. Had some hypotension overnight but asymptomatic. 
10/28 - She states \"my feet look like a normal human's today\". Denies CP/SOB. Lupe Rodriguez F/C/N/V. 
10/29 - States that she feels good this morning. Denies CP/SOB. Still with mild LE edema. 10/30/18 Sitting in chair on 1 lit/min nasal cannula Chronic tingling sensation left thigh Says doing ok 10/31/18 No complaints 18 Says doing fine No complaints 18 Says doing ok Objective:  
 
Patient Vitals for the past 24 hrs: 
 Temp Pulse Resp BP SpO2  
18 1743  (!) 114  116/81   
18 1602 98.3 °F (36.8 °C) 99 18 106/68 99 % 18 1150 98.1 °F (36.7 °C) 94 18 109/73 99 % 11/02/18 0818 97.6 °F (36.4 °C) 90 18 104/68 98 % 11/02/18 0739     97 % 11/02/18 0354 98 °F (36.7 °C) 80 20 97/61 97 % 11/01/18 2352 98.1 °F (36.7 °C) 84 20 101/64 99 % 11/01/18 2055 98.2 °F (36.8 °C) 99 20 112/73 99 % 11/01/18 2037     100 % Oxygen Therapy O2 Sat (%): 99 % (11/02/18 1602) Pulse via Oximetry: 88 beats per minute (11/02/18 0739) O2 Device: Room air (11/02/18 0739) O2 Flow Rate (L/min): 1 l/min (11/01/18 2037) FIO2 (%): 21 % (11/01/18 0800) Intake/Output Summary (Last 24 hours) at 11/2/2018 1818 Last data filed at 11/2/2018 1753 Gross per 24 hour Intake 1075 ml Output 3950 ml Net -2875 ml General:    Well nourished. Alert.   
heent- normal 
CV:   RRR. No murmur, rub, or gallop. Lungs:   Minimal basilar crepitations Abdomen:   Soft, nontender, nondistended. Cns- no focal neurological deficits Extremities: Warm and dry. No cyanosis . mild pedal edema. Skin:     No rashes or jaundice. Data Review: 
I have reviewed all labs, meds, telemetry events, and studies from the last 24 hours. No results found for this or any previous visit (from the past 24 hour(s)). All Micro Results Procedure Component Value Units Date/Time CULTURE, BODY FLUID Colleen Purpura [[id number]] Collected:  10/26/18 0830 Order Status:  Completed Specimen:  Pleural Fluid Updated:  10/28/18 2782 Special Requests: NO SPECIAL REQUESTS     
  GRAM STAIN NO WBCS SEEN     
   NO DEFINITE ORGANISM SEEN Culture result: NO GROWTH 2 DAYS     
 CULTURE, BLOOD [[id number]] Collected:  10/17/18 1321 Order Status:  Completed Specimen:  Blood Updated:  10/22/18 0815 Special Requests: --     
  RIGHT 
HAND Culture result: NO GROWTH 5 DAYS     
 CULTURE, BLOOD [[id number]] Collected:  10/17/18 1319 Order Status:  Completed Specimen:  Blood Updated:  10/22/18 8015 Special Requests: --     
  RIGHT 
HAND Culture result: NO GROWTH 5 DAYS CULTURE, BLOOD [[id number]] Collected:  10/15/18 2233 Order Status:  Completed Specimen:  Blood Updated:  10/20/18 1150 Special Requests: RIGHT ANTECUBITAL Culture result: NO GROWTH 5 DAYS     
 CULTURE, BLOOD [[id number]] Collected:  10/15/18 2235 Order Status:  Completed Specimen:  Blood Updated:  10/20/18 1150 Special Requests: RIGHT ARM Culture result: NO GROWTH 5 DAYS FUNGUS CULTURE AND SMEAR [[id number]] Collected:  10/16/18 9080 Order Status:  Completed Specimen:  Other from Miscellaneous sample Updated:  10/19/18 1138 Source THORACENTESIS Comment: RIGHT Corrected on 10/16 AT 0951: This is a correction to the medical record of the test results previously reported as THORACENTESIS Fungus stain Direct Inoculation Fungus (Mycology) Culture Other source received Comment: (NOTE) Performed At: [address] [id number] Tavo Sharma MD [id number] CULTURE, BLOOD [[id number]] Collected:  10/14/18 1250 Order Status:  Completed Specimen:  Whole Blood Updated:  10/19/18 0809 Special Requests: --     
  RIGHT 
HAND Culture result: NO GROWTH 5 DAYS     
 CULTURE, BLOOD [[id number]] Collected:  10/14/18 1251 Order Status:  Completed Specimen:  Whole Blood Updated:  10/19/18 0809 Special Requests: --     
  HAND 
LEFT Culture result: NO GROWTH 5 DAYS     
 CULTURE, BODY FLUID W Nancylee Bushy [[id number]] Collected:  10/16/18 5899 Order Status:  Completed Specimen:  Thoracentesis Updated:  10/18/18 0074 Special Requests: NO SPECIAL REQUESTS     
  GRAM STAIN 0 TO 10 WBC'S/OIF  
   NO DEFINITE ORGANISM SEEN Culture result: NO GROWTH 2 DAYS     
 AFB CULTURE + SMEAR W/RFLX ID FROM CULTURE [[id number]] Collected:  10/16/18 0951 Order Status:  Completed Specimen:  Miscellaneous sample Updated:  10/17/18 1736 Source THORACENTESIS Comment: RIGHT Corrected on 10/16 AT 0951: This is a correction to the medical record of the test results previously reported as THORACENTESIS 
  
  
  AFB Specimen processing Concentration Acid Fast Smear NEGATIVE Comment: (NOTE) Performed At: [address] [id number] Marisela Sanchez MD [id number] Acid Fast Culture PENDING  
 CULTURE, BLOOD [[id number]] Collected:  10/14/18 1300 Order Status:  Canceled Specimen:  Whole Blood Current Meds: 
Current Facility-Administered Medications Medication Dose Route Frequency  albuterol (PROVENTIL VENTOLIN) nebulizer solution 2.5 mg  2.5 mg Nebulization BID RT  
 metoprolol succinate (TOPROL-XL) XL tablet 25 mg  25 mg Oral QPM  
 spironolactone (ALDACTONE) tablet 50 mg  50 mg Oral BID  lisinopril (PRINIVIL, ZESTRIL) tablet 2.5 mg  2.5 mg Oral DAILY  ceFAZolin (ANCEF) 2 g/20 mL in sterile water IV syringe  2 g IntraVENous Q8H  
 ferrous sulfate tablet 325 mg  1 Tab Oral TID WITH MEALS  
 heparin (porcine) injection 5,000 Units  5,000 Units SubCUTAneous Q12H  
 magnesium oxide (MAG-OX) tablet 400 mg  400 mg Oral TID  methadone (DOLOPHINE) tablet 30 mg  30 mg Oral BID  pantoprazole (PROTONIX) tablet 40 mg  40 mg Oral ACB  polyethylene glycol (MIRALAX) packet 17 g  17 g Oral DAILY  potassium chloride (K-DUR, KLOR-CON) SR tablet 20 mEq  20 mEq Oral DAILY  senna-docusate (PERICOLACE) 8.6-50 mg per tablet 2 Tab  2 Tab Oral QHS  sodium chloride (NS) flush 10 mL  10 mL InterCATHeter Q8H  
 sodium chloride (NS) flush 5-10 mL  5-10 mL IntraVENous Q8H  
 torsemide (DEMADEX) tablet 20 mg  20 mg Oral BID  acetaminophen (TYLENOL) tablet 650 mg  650 mg Oral Q6H PRN  
 lip protectant (BLISTEX) ointment   Topical PRN  
 magnesium hydroxide (MILK OF MAGNESIA) 400 mg/5 mL oral suspension 30 mL  30 mL Oral DAILY PRN  
 ondansetron (ZOFRAN) injection 4 mg  4 mg IntraVENous Q4H PRN  
  sodium chloride (NS) flush 10 mL  10 mL InterCATHeter PRN  
 sodium chloride (NS) flush 5-10 mL  5-10 mL IntraVENous PRN Other Studies (last 24 hours): No results found. Assessment and Plan:  
 
Hospital Problems as of 11/2/2018 Never Reviewed Codes Class Noted - Resolved POA * (Principal) Endocarditis due to methicillin susceptible Staphylococcus aureus (MSSA) ICD-10-CM: I33.0, B95.61 ICD-9-CM: 421.0, 041.11  10/23/2018 - Present Yes Acute respiratory failure ([facility] ICD-10-CM: J96.00 
ICD-9-CM: 518.81  10/23/2018 - Present Yes Hypoproteinemia ([facility] ICD-10-CM: E77.8 ICD-9-CM: 273.8  10/17/2018 - Present Unknown Pleural effusion ICD-10-CM: J90 ICD-9-CM: 511.9  10/16/2018 - Present Unknown Overview Signed 10/24/2018  9:03 AM by Dayana Lopez MD  
  Loculated on the right Hypomagnesemia ICD-10-CM: Z46.25 
ICD-9-CM: 275.2  10/16/2018 - Present Unknown Hypokalemia ICD-10-CM: E87.6 ICD-9-CM: 276.8  10/16/2018 - Present Unknown Sepsis ([facility] ICD-10-CM: A41.9 ICD-9-CM: 038.9, 995.91  10/14/2018 - Present Unknown MSSA bacteremia ICD-10-CM: R78.81 ICD-9-CM: 790.7, 041.11  10/14/2018 - Present Unknown Septic arthritis ([facility] ICD-10-CM: M00.9 ICD-9-CM: 711.00  10/14/2018 - Present Unknown Prolonged Q-T interval on ECG ICD-10-CM: R94.31 
ICD-9-CM: 794.31  10/14/2018 - Present Unknown Transaminitis ICD-10-CM: R74.0 ICD-9-CM: 790.4  10/14/2018 - Present Unknown Hyponatremia ICD-10-CM: E87.1 ICD-9-CM: 276.1  10/14/2018 - Present Unknown Systolic CHF, acute on chronic (HCC) ICD-10-CM: R94.27 ICD-9-CM: 428.23, 428.0  8/30/2018 - Present Yes Heroin abuse ([facility] ICD-10-CM: F11.10 ICD-9-CM: 305.50  4/10/2018 - Present Yes PLAN:    
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 
- Continue Cefazolin - EOT 11/5/18 
  
Active Problems: MSSA bacteremia (10/14/2018) - Due to IVDU 
- Continue Cefazolin - EOT 11/5/18 
  
  Sepsis ([address] (10/14/2018) - Due to #1 
- Resolved with abx 
  
  Septic arthritis ([address] (10/14/2018) - Improving 
- Continue Cefazolin 
  
  Systolic CHF, acute on chronic ([address] (8/30/2018) - EF 10% 
- Continue Demadex 20mg BID 
- Continue Spironolactone 50mg BID 
- Change Toprol BID to QHS 
- Continue Lisinopril 2.5mg 
  
  Acute respiratory failure ([address] (10/23/2018) - Improving with diuresis - S/P thoracentesis by pulm and IR on 10/27/18 
- Continue oxygen- 1 lit/min- try to wean 
 
  
  Prolonged Q-T interval on ECG (10/14/2018) 
  
  Hyponatremia (10/14/2018) - Resolved 
  
  Pleural effusion (10/16/2018) - S/P thoracentesis by pulm and IR on 10/27/18 - Pulmonary signed off 10/30/18 
  
  Hypomagnesemia (10/16/2018) 
resolved 
  
  Hypokalemia (10/16/2018) - Resolved 
  
  Heroin abuse ([address] (4/10/2018) - Continue Methadone 
- CANNOT leave with PICC line 
  
  Transaminitis (10/14/2018) - Resolved 
  
  Hypoproteinemia ([address] (10/17/2018) - Due to drug use and lack of appetite 
  
Dispo - Continue Cefazolin EOT 11/5/18. Had thoracentesis by IR on 10/26/18. 
  
DIET NUTRITIONAL SUPPLEMENTS All Meals; Ensure [employer] DIET REGULAR 2 GM NA (House Low NA) DVT Prophylaxis: Heparin Signed: 
Delfino Greer MD

## 2018-11-02 NOTE — PROGRESS NOTES
Assessment completed and documented. Lung sounds clear. Heart sounds regular. Bowel sounds active. Abdomen soft. Up ad delores. Currently up in chair. No complaints of pain. Sitter at bedside. Will continue to monitor with hourly rounding.

## 2018-11-02 NOTE — PROGRESS NOTES
Problem: Falls - Risk of 
Goal: *Absence of Falls Document Bruno Martinez Fall Risk and appropriate interventions in the flowsheet. Outcome: Progressing Towards Goal 
Fall Risk Interventions: 
Mobility Interventions: Patient to call before getting OOB Medication Interventions: Teach patient to arise slowly Elimination Interventions: Call light in reach

## 2018-11-03 PROCEDURE — 74011250636 HC RX REV CODE- 250/636: Performed by: INTERNAL MEDICINE

## 2018-11-03 PROCEDURE — 74011000250 HC RX REV CODE- 250: Performed by: FAMILY MEDICINE

## 2018-11-03 PROCEDURE — 74011250637 HC RX REV CODE- 250/637: Performed by: INTERNAL MEDICINE

## 2018-11-03 PROCEDURE — 94640 AIRWAY INHALATION TREATMENT: CPT

## 2018-11-03 PROCEDURE — 65270000029 HC RM PRIVATE

## 2018-11-03 PROCEDURE — 94760 N-INVAS EAR/PLS OXIMETRY 1: CPT

## 2018-11-03 RX ADMIN — ONDANSETRON HYDROCHLORIDE 4 MG: 2 INJECTION INTRAMUSCULAR; INTRAVENOUS at 17:29

## 2018-11-03 RX ADMIN — POTASSIUM CHLORIDE 20 MEQ: 20 TABLET, EXTENDED RELEASE ORAL at 09:02

## 2018-11-03 RX ADMIN — TORSEMIDE 20 MG: 20 TABLET ORAL at 17:33

## 2018-11-03 RX ADMIN — Medication 2 G: at 10:00

## 2018-11-03 RX ADMIN — FERROUS SULFATE TAB 325 MG (65 MG ELEMENTAL FE) 325 MG: 325 (65 FE) TAB at 09:11

## 2018-11-03 RX ADMIN — SPIRONOLACTONE 50 MG: 25 TABLET ORAL at 09:02

## 2018-11-03 RX ADMIN — Medication 2 G: at 02:31

## 2018-11-03 RX ADMIN — HEPARIN SODIUM 5000 UNITS: 5000 INJECTION INTRAVENOUS; SUBCUTANEOUS at 17:38

## 2018-11-03 RX ADMIN — Medication 10 ML: at 06:00

## 2018-11-03 RX ADMIN — FERROUS SULFATE TAB 325 MG (65 MG ELEMENTAL FE) 325 MG: 325 (65 FE) TAB at 17:35

## 2018-11-03 RX ADMIN — Medication 2 G: at 17:29

## 2018-11-03 RX ADMIN — LISINOPRIL 2.5 MG: 5 TABLET ORAL at 09:03

## 2018-11-03 RX ADMIN — Medication 10 ML: at 21:43

## 2018-11-03 RX ADMIN — Medication 5 ML: at 17:34

## 2018-11-03 RX ADMIN — SENNOSIDES AND DOCUSATE SODIUM 2 TABLET: 8.6; 5 TABLET ORAL at 21:42

## 2018-11-03 RX ADMIN — ALBUTEROL SULFATE 2.5 MG: 2.5 SOLUTION RESPIRATORY (INHALATION) at 20:21

## 2018-11-03 RX ADMIN — Medication 400 MG: at 21:42

## 2018-11-03 RX ADMIN — TORSEMIDE 20 MG: 20 TABLET ORAL at 09:02

## 2018-11-03 RX ADMIN — Medication 400 MG: at 09:02

## 2018-11-03 RX ADMIN — METHADONE HYDROCHLORIDE 30 MG: 10 TABLET ORAL at 09:02

## 2018-11-03 RX ADMIN — METOPROLOL SUCCINATE 25 MG: 25 TABLET, EXTENDED RELEASE ORAL at 17:33

## 2018-11-03 RX ADMIN — PANTOPRAZOLE SODIUM 40 MG: 40 TABLET, DELAYED RELEASE ORAL at 09:11

## 2018-11-03 RX ADMIN — Medication 400 MG: at 17:34

## 2018-11-03 RX ADMIN — Medication 5 ML: at 21:43

## 2018-11-03 RX ADMIN — ONDANSETRON HYDROCHLORIDE 4 MG: 2 INJECTION INTRAMUSCULAR; INTRAVENOUS at 09:56

## 2018-11-03 RX ADMIN — METHADONE HYDROCHLORIDE 30 MG: 10 TABLET ORAL at 17:33

## 2018-11-03 RX ADMIN — ALBUTEROL SULFATE 2.5 MG: 2.5 SOLUTION RESPIRATORY (INHALATION) at 08:35

## 2018-11-03 RX ADMIN — ONDANSETRON HYDROCHLORIDE 4 MG: 2 INJECTION INTRAMUSCULAR; INTRAVENOUS at 02:36

## 2018-11-03 RX ADMIN — SPIRONOLACTONE 50 MG: 25 TABLET ORAL at 17:33

## 2018-11-03 NOTE — PROGRESS NOTES
Hospitalist Progress Note Admit Date:  10/14/2018 12:05 PM  
Name:  Nany Jeffery Age:  28 y.o. 
:  1985 MRN:  [id number] PCP:  None Treatment Team: Attending Provider: Brittaney Quan DO; Care Manager: Millie Means Consulting Provider: Keny Rocha MD; Utilization Review: Joel Harper RN; Care Manager: Tha Clark Consulting Provider: Shabbir Mathis; Utilization Review: Edwina Rivera Subjective:  
From previous notes: \"29 year old female with history of CHF EF 10% diagnosed , IVDU heroin, presented to the ED with worsening shortness of breath pedal edema, with on going heroin use. She had signed out of [address]   Where she was being treated for MSSA bacteremia/endocarditis, (with suspicion of heroin use via her picc line) with end date at that time 10/14. Since then she was off her CHF medications. And had not received any antibiotics She presented with pain in hip, worsening shortness of breath, and decompensated CHF. She has been started on cefazolin here by ID with EOT . We have diuresed her 10L, and currently is on spironolactone and torsemide with on going good diuresis. \" 
  
10/27 - She feels good this morning. Denies CP/SOB. Still with BLE edema. Had some hypotension overnight but asymptomatic. 
10/28 - She states \"my feet look like a normal human's today\". Denies CP/SOB. Jourdan Nap F/C/N/V. 
10/29 - States that she feels good this morning. Denies CP/SOB. Still with mild LE edema. 10/30/18 Sitting in chair on 1 lit/min nasal cannula Chronic tingling sensation left thigh Says doing ok 10/31/18 No complaints 18 Says doing fine No complaints 18 Says doing ok 
 
11/3/18 Says gets her methadone as 60 mg at the clinic(getting 30 mg bid here) No complaints. One episode of low bp yesterday Objective:  
 
Patient Vitals for the past 24 hrs: 
 Temp Pulse Resp BP SpO2 11/03/18 1120 98.1 °F (36.7 °C) 80 18 107/72 96 % 11/03/18 0835     98 % 11/03/18 0755 97 °F (36.1 °C) 82 18 104/63 98 % 11/03/18 0344 96.6 °F (35.9 °C) 80 18 (!) 86/58 95 % 11/02/18 2341 97.9 °F (36.6 °C) 97 18 105/71 99 % 11/02/18 2005     96 % 11/02/18 2000 98.8 °F (37.1 °C) 97 21 99/64 98 % 11/02/18 1743  (!) 114  116/81   
11/02/18 1602 98.3 °F (36.8 °C) 99 18 106/68 99 % Oxygen Therapy O2 Sat (%): 96 % (11/03/18 1120) Pulse via Oximetry: 91 beats per minute (11/03/18 0835) O2 Device: Room air (11/03/18 0835) O2 Flow Rate (L/min): 0 l/min (11/03/18 0835) FIO2 (%): 21 % (11/03/18 0835) Intake/Output Summary (Last 24 hours) at 11/3/2018 1521 Last data filed at 11/3/2018 0240 Gross per 24 hour Intake 360 ml Output 1500 ml Net -1140 ml General:    Well nourished. Alert.   
heent- normal 
CV:   RRR. No murmur, rub, or gallop. Lungs:   Minimal basilar crepitations Abdomen:   Soft, nontender, nondistended. Cns- no focal neurological deficits Extremities: Warm and dry. No cyanosis . mild pedal edema. Skin:     No rashes or jaundice. Data Review: 
I have reviewed all labs, meds, telemetry events, and studies from the last 24 hours. No results found for this or any previous visit (from the past 24 hour(s)). All Micro Results Procedure Component Value Units Date/Time CULTURE, BODY FLUID Kandice Chauhan [[id number]] Collected:  10/26/18 2032 Order Status:  Completed Specimen:  Pleural Fluid Updated:  10/28/18 6011 Special Requests: NO SPECIAL REQUESTS     
  GRAM STAIN NO WBCS SEEN     
   NO DEFINITE ORGANISM SEEN Culture result: NO GROWTH 2 DAYS     
 CULTURE, BLOOD [[id number]] Collected:  10/17/18 1321 Order Status:  Completed Specimen:  Blood Updated:  10/22/18 0815 Special Requests: --     
  RIGHT 
HAND Culture result: NO GROWTH 5 DAYS     
 CULTURE, BLOOD [[id number]] Collected:  10/17/18 131 Order Status:  Completed Specimen:  Blood Updated:  10/22/18 0815 Special Requests: --     
  RIGHT 
HAND Culture result: NO GROWTH 5 DAYS     
 CULTURE, BLOOD [[id number]] Collected:  10/15/18 2233 Order Status:  Completed Specimen:  Blood Updated:  10/20/18 1150 Special Requests: RIGHT ANTECUBITAL Culture result: NO GROWTH 5 DAYS     
 CULTURE, BLOOD [[id number]] Collected:  10/15/18 2235 Order Status:  Completed Specimen:  Blood Updated:  10/20/18 1150 Special Requests: RIGHT ARM Culture result: NO GROWTH 5 DAYS FUNGUS CULTURE AND SMEAR [[id number]] Collected:  10/16/18 9715 Order Status:  Completed Specimen:  Other from Miscellaneous sample Updated:  10/19/18 1138 Source THORACENTESIS Comment: RIGHT Corrected on 10/16 AT 0951: This is a correction to the medical record of the test results previously reported as THORACENTESIS Fungus stain Direct Inoculation Fungus (Mycology) Culture Other source received Comment: (NOTE) Performed At: [address] [id number] Óscar Mansfield MD EA:[id number] CULTURE, BLOOD [[id number]] Collected:  10/14/18 1250 Order Status:  Completed Specimen:  Whole Blood Updated:  10/19/18 0809 Special Requests: --     
  RIGHT 
HAND Culture result: NO GROWTH 5 DAYS     
 CULTURE, BLOOD [[id number]] Collected:  10/14/18 1251 Order Status:  Completed Specimen:  Whole Blood Updated:  10/19/18 0809 Special Requests: --     
  HAND 
LEFT Culture result: NO GROWTH 5 DAYS     
 CULTURE, BODY FLUID W Tarik Blanton [[id number]] Collected:  10/16/18 6924 Order Status:  Completed Specimen:  Thoracentesis Updated:  10/18/18 0363 Special Requests: NO SPECIAL REQUESTS     
  GRAM STAIN 0 TO 10 WBC'S/OIF  
   NO DEFINITE ORGANISM SEEN   Culture result: NO GROWTH 2 DAYS     
 AFB CULTURE + SMEAR W/RFLX ID FROM CULTURE [[id number]] Collected: 10/16/18 4954 Order Status:  Completed Specimen:  Miscellaneous sample Updated:  10/17/18 1736 Source THORACENTESIS Comment: RIGHT Corrected on 10/16 AT 0951: This is a correction to the medical record of the test results previously reported as THORACENTESIS 
  
  
  AFB Specimen processing Concentration Acid Fast Smear NEGATIVE Comment: (NOTE) Performed At: [address] [id number] Lashonda Smith MD OA:[id number] Acid Fast Culture PENDING  
 CULTURE, BLOOD [[id number]] Collected:  10/14/18 1300 Order Status:  Canceled Specimen:  Whole Blood Current Meds: 
Current Facility-Administered Medications Medication Dose Route Frequency  albuterol (PROVENTIL VENTOLIN) nebulizer solution 2.5 mg  2.5 mg Nebulization BID RT  
 metoprolol succinate (TOPROL-XL) XL tablet 25 mg  25 mg Oral QPM  
 spironolactone (ALDACTONE) tablet 50 mg  50 mg Oral BID  lisinopril (PRINIVIL, ZESTRIL) tablet 2.5 mg  2.5 mg Oral DAILY  ceFAZolin (ANCEF) 2 g/20 mL in sterile water IV syringe  2 g IntraVENous Q8H  
 ferrous sulfate tablet 325 mg  1 Tab Oral TID WITH MEALS  
 heparin (porcine) injection 5,000 Units  5,000 Units SubCUTAneous Q12H  
 magnesium oxide (MAG-OX) tablet 400 mg  400 mg Oral TID  methadone (DOLOPHINE) tablet 30 mg  30 mg Oral BID  pantoprazole (PROTONIX) tablet 40 mg  40 mg Oral ACB  polyethylene glycol (MIRALAX) packet 17 g  17 g Oral DAILY  potassium chloride (K-DUR, KLOR-CON) SR tablet 20 mEq  20 mEq Oral DAILY  senna-docusate (PERICOLACE) 8.6-50 mg per tablet 2 Tab  2 Tab Oral QHS  sodium chloride (NS) flush 10 mL  10 mL InterCATHeter Q8H  
 sodium chloride (NS) flush 5-10 mL  5-10 mL IntraVENous Q8H  
 torsemide (DEMADEX) tablet 20 mg  20 mg Oral BID  acetaminophen (TYLENOL) tablet 650 mg  650 mg Oral Q6H PRN  
 lip protectant (BLISTEX) ointment   Topical PRN  
  magnesium hydroxide (MILK OF MAGNESIA) 400 mg/5 mL oral suspension 30 mL  30 mL Oral DAILY PRN  
 ondansetron (ZOFRAN) injection 4 mg  4 mg IntraVENous Q4H PRN  
 sodium chloride (NS) flush 10 mL  10 mL InterCATHeter PRN  
 sodium chloride (NS) flush 5-10 mL  5-10 mL IntraVENous PRN Other Studies (last 24 hours): No results found. Assessment and Plan:  
 
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 
ICD-9-CM: 518.81  10/23/2018 - Present Yes Hypoproteinemia ([facility] ICD-10-CM: E77.8 ICD-9-CM: 273.8  10/17/2018 - Present Unknown Pleural effusion ICD-10-CM: J90 ICD-9-CM: 511.9  10/16/2018 - Present Unknown Overview Signed 10/24/2018  9:03 AM by Wale Kendrick MD  
  Loculated on the right Hypomagnesemia ICD-10-CM: J09.11 
ICD-9-CM: 275.2  10/16/2018 - Present Unknown Hypokalemia ICD-10-CM: E87.6 ICD-9-CM: 276.8  10/16/2018 - Present Unknown Sepsis ([facility] ICD-10-CM: A41.9 ICD-9-CM: 038.9, 995.91  10/14/2018 - Present Unknown MSSA bacteremia ICD-10-CM: R78.81 ICD-9-CM: 790.7, 041.11  10/14/2018 - Present Unknown Septic arthritis ([facility] ICD-10-CM: M00.9 ICD-9-CM: 711.00  10/14/2018 - Present Unknown Prolonged Q-T interval on ECG ICD-10-CM: R94.31 
ICD-9-CM: 794.31  10/14/2018 - Present Unknown Transaminitis ICD-10-CM: R74.0 ICD-9-CM: 790.4  10/14/2018 - Present Unknown Hyponatremia ICD-10-CM: E87.1 ICD-9-CM: 276.1  10/14/2018 - Present Unknown Systolic CHF, acute on chronic (HCC) ICD-10-CM: B58.97 ICD-9-CM: 428.23, 428.0  8/30/2018 - Present Yes Heroin abuse ([facility] ICD-10-CM: F11.10 ICD-9-CM: 305.50  4/10/2018 - Present Yes PLAN:    
 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 
- Continue Cefazolin - EOT 11/5/18 
  
Active Problems: MSSA bacteremia (10/14/2018) - Due to IVDU 
- Continue Cefazolin - EOT 11/5/18 
  
  Sepsis ([address] (10/14/2018) - Due to #1 
- Resolved with abx 
  
  Septic arthritis ([address] (10/14/2018) - Improving 
- Continue Cefazolin 
  
  Systolic CHF, acute on chronic ([address] (8/30/2018) - EF 10% 
- Continue Demadex 20mg BID 
- Continue Spironolactone 50mg BID 
- Change Toprol BID to QHS 
- Continue Lisinopril 2.5mg 
  
  Acute respiratory failure ([address] (10/23/2018) - Improving with diuresis - S/P thoracentesis by pulm and IR on 10/27/18 
- Continue oxygen- 1 lit/min- try to wean 
 
  
  Prolonged Q-T interval on ECG (10/14/2018) 
  
  Hyponatremia (10/14/2018) - Resolved 
  
  Pleural effusion (10/16/2018) - S/P thoracentesis by pulm and IR on 10/27/18 - Pulmonary signed off 10/30/18 
  
  Hypomagnesemia (10/16/2018) 
resolved 
  
  Hypokalemia (10/16/2018) - Resolved 
  
  Heroin abuse ([address] (4/10/2018) - Continue Methadone 
- CANNOT leave with PICC line 
  
  Transaminitis (10/14/2018) - Resolved 
  
  Hypoproteinemia ([address] (10/17/2018) - Due to drug use and lack of appetite 
  
Dispo - Continue Cefazolin EOT 11/5/18. Had thoracentesis by IR on 10/26/18. 
  
DIET NUTRITIONAL SUPPLEMENTS All Meals; Ensure [employer] DIET REGULAR 2 GM NA (House Low NA) DVT Prophylaxis: Heparin Signed: 
Jose Alfredo Hoyos MD 
 dietitian/nutrition services

## 2018-11-03 NOTE — PROGRESS NOTES
PM Shift Assessment - patient is alert and oriented x4. Patient has been calm and cooperative. No signs of distress. Currently resting in a low, locked bed with call light within reach. Sitter present in room.

## 2018-11-03 NOTE — PROGRESS NOTES
OOB in recliner no acute distress, lungs crackles bilateral diminished in the bases, no SOB, neurovascular checks WDL, no complaints of pain, sitter at beside.

## 2018-11-04 PROCEDURE — 74011250636 HC RX REV CODE- 250/636: Performed by: INTERNAL MEDICINE

## 2018-11-04 PROCEDURE — 65270000029 HC RM PRIVATE

## 2018-11-04 PROCEDURE — 74011000250 HC RX REV CODE- 250: Performed by: FAMILY MEDICINE

## 2018-11-04 PROCEDURE — 94640 AIRWAY INHALATION TREATMENT: CPT

## 2018-11-04 PROCEDURE — 74011250637 HC RX REV CODE- 250/637: Performed by: INTERNAL MEDICINE

## 2018-11-04 PROCEDURE — 94760 N-INVAS EAR/PLS OXIMETRY 1: CPT

## 2018-11-04 RX ADMIN — SENNOSIDES AND DOCUSATE SODIUM 2 TABLET: 8.6; 5 TABLET ORAL at 22:01

## 2018-11-04 RX ADMIN — LISINOPRIL 2.5 MG: 5 TABLET ORAL at 08:06

## 2018-11-04 RX ADMIN — ONDANSETRON HYDROCHLORIDE 4 MG: 2 INJECTION INTRAMUSCULAR; INTRAVENOUS at 17:07

## 2018-11-04 RX ADMIN — SPIRONOLACTONE 50 MG: 25 TABLET ORAL at 17:07

## 2018-11-04 RX ADMIN — HEPARIN SODIUM 5000 UNITS: 5000 INJECTION INTRAVENOUS; SUBCUTANEOUS at 05:16

## 2018-11-04 RX ADMIN — Medication 400 MG: at 22:01

## 2018-11-04 RX ADMIN — METHADONE HYDROCHLORIDE 30 MG: 10 TABLET ORAL at 08:06

## 2018-11-04 RX ADMIN — PANTOPRAZOLE SODIUM 40 MG: 40 TABLET, DELAYED RELEASE ORAL at 08:06

## 2018-11-04 RX ADMIN — METOPROLOL SUCCINATE 25 MG: 25 TABLET, EXTENDED RELEASE ORAL at 17:07

## 2018-11-04 RX ADMIN — Medication 400 MG: at 17:07

## 2018-11-04 RX ADMIN — HEPARIN SODIUM 5000 UNITS: 5000 INJECTION INTRAVENOUS; SUBCUTANEOUS at 17:10

## 2018-11-04 RX ADMIN — TORSEMIDE 20 MG: 20 TABLET ORAL at 08:06

## 2018-11-04 RX ADMIN — ALBUTEROL SULFATE 2.5 MG: 2.5 SOLUTION RESPIRATORY (INHALATION) at 20:21

## 2018-11-04 RX ADMIN — POTASSIUM CHLORIDE 20 MEQ: 20 TABLET, EXTENDED RELEASE ORAL at 08:05

## 2018-11-04 RX ADMIN — ONDANSETRON HYDROCHLORIDE 4 MG: 2 INJECTION INTRAMUSCULAR; INTRAVENOUS at 08:11

## 2018-11-04 RX ADMIN — FERROUS SULFATE TAB 325 MG (65 MG ELEMENTAL FE) 325 MG: 325 (65 FE) TAB at 08:05

## 2018-11-04 RX ADMIN — Medication 10 ML: at 22:03

## 2018-11-04 RX ADMIN — Medication 2 G: at 17:06

## 2018-11-04 RX ADMIN — FERROUS SULFATE TAB 325 MG (65 MG ELEMENTAL FE) 325 MG: 325 (65 FE) TAB at 12:41

## 2018-11-04 RX ADMIN — SPIRONOLACTONE 50 MG: 25 TABLET ORAL at 08:05

## 2018-11-04 RX ADMIN — FERROUS SULFATE TAB 325 MG (65 MG ELEMENTAL FE) 325 MG: 325 (65 FE) TAB at 17:07

## 2018-11-04 RX ADMIN — Medication 10 ML: at 05:16

## 2018-11-04 RX ADMIN — Medication 10 ML: at 14:01

## 2018-11-04 RX ADMIN — ALBUTEROL SULFATE 2.5 MG: 2.5 SOLUTION RESPIRATORY (INHALATION) at 08:53

## 2018-11-04 RX ADMIN — Medication 10 ML: at 05:17

## 2018-11-04 RX ADMIN — Medication 10 ML: at 22:02

## 2018-11-04 RX ADMIN — ONDANSETRON HYDROCHLORIDE 4 MG: 2 INJECTION INTRAMUSCULAR; INTRAVENOUS at 01:38

## 2018-11-04 RX ADMIN — METHADONE HYDROCHLORIDE 30 MG: 10 TABLET ORAL at 17:07

## 2018-11-04 RX ADMIN — Medication 400 MG: at 08:05

## 2018-11-04 RX ADMIN — TORSEMIDE 20 MG: 20 TABLET ORAL at 17:07

## 2018-11-04 RX ADMIN — Medication 2 G: at 08:06

## 2018-11-04 RX ADMIN — Medication 2 G: at 01:38

## 2018-11-04 NOTE — PROGRESS NOTES
Assessment complete via flow sheet. Pt sleeping, rouses easily &Ox3. Respirations even and unlabored. S1 S2 auscultated. Pt on room air. Pt reports pain level of 0 out of 10. Bowel sounds active, abdomen soft. Denies other needs. Bed in lowest position, side rails up 3, call bell in reach. Instructed to call for assistance. Pt verbalized understanding. Plan of care reviewed with patient.

## 2018-11-04 NOTE — PROGRESS NOTES
Progress Note Patient: Stephon Garces MRN: [id number]  SSN: [id number] YOB: 1985  Age: 28 y.o. Sex: female Admit Date: 10/14/2018 LOS: 21 days Subjective:  
 
seen and examined at bedside, denies any chest pain or palpitations. Afebrile. Current Facility-Administered Medications Medication Dose Route Frequency  albuterol (PROVENTIL VENTOLIN) nebulizer solution 2.5 mg  2.5 mg Nebulization BID RT  
 metoprolol succinate (TOPROL-XL) XL tablet 25 mg  25 mg Oral QPM  
 spironolactone (ALDACTONE) tablet 50 mg  50 mg Oral BID  lisinopril (PRINIVIL, ZESTRIL) tablet 2.5 mg  2.5 mg Oral DAILY  ceFAZolin (ANCEF) 2 g/20 mL in sterile water IV syringe  2 g IntraVENous Q8H  
 ferrous sulfate tablet 325 mg  1 Tab Oral TID WITH MEALS  
 heparin (porcine) injection 5,000 Units  5,000 Units SubCUTAneous Q12H  
 magnesium oxide (MAG-OX) tablet 400 mg  400 mg Oral TID  methadone (DOLOPHINE) tablet 30 mg  30 mg Oral BID  pantoprazole (PROTONIX) tablet 40 mg  40 mg Oral ACB  polyethylene glycol (MIRALAX) packet 17 g  17 g Oral DAILY  potassium chloride (K-DUR, KLOR-CON) SR tablet 20 mEq  20 mEq Oral DAILY  senna-docusate (PERICOLACE) 8.6-50 mg per tablet 2 Tab  2 Tab Oral QHS  sodium chloride (NS) flush 10 mL  10 mL InterCATHeter Q8H  
 sodium chloride (NS) flush 5-10 mL  5-10 mL IntraVENous Q8H  
 torsemide (DEMADEX) tablet 20 mg  20 mg Oral BID  acetaminophen (TYLENOL) tablet 650 mg  650 mg Oral Q6H PRN  
 lip protectant (BLISTEX) ointment   Topical PRN  
 magnesium hydroxide (MILK OF MAGNESIA) 400 mg/5 mL oral suspension 30 mL  30 mL Oral DAILY PRN  
 ondansetron (ZOFRAN) injection 4 mg  4 mg IntraVENous Q4H PRN  
 sodium chloride (NS) flush 10 mL  10 mL InterCATHeter PRN  
 sodium chloride (NS) flush 5-10 mL  5-10 mL IntraVENous PRN Objective:  
 
Vitals:  
 11/04/18 0552 11/04/18 0749 11/04/18 0853 11/04/18 1143 BP:  92/56  99/62 Pulse:  88  96 Resp:  15  16 Temp:  97.9 °F (36.6 °C)  98.2 °F (36.8 °C) SpO2:  96% 98% 96% Weight: 71.6 kg (157 lb 12.8 oz) Height:      
  
  
Intake and Output: 
Current Shift: No intake/output data recorded. Last three shifts: 11/02 1901 - 11/04 0700 In: -  
Out: 1000 [Urine:1000] Physical Exam:  
General:  Alert, cooperative, no distress, appears stated age. Eyes:  Conjunctivae/corneas clear. PERRL, EOMs intact. Fundi benign Ears:  Normal TMs and external ear canals both ears. Nose: Nares normal. Septum midline. Mucosa normal. No drainage or sinus tenderness. Mouth/Throat: Lips, mucosa, and tongue normal. Teeth and gums normal.  
Neck: Supple, symmetrical, trachea midline, no adenopathy, thyroid: no enlargment/tenderness/nodules, no carotid bruit and no JVD. Back:   Symmetric, no curvature. ROM normal. No CVA tenderness. Lungs:   Clear to auscultation bilaterally. Heart:   murmer audible on auscultation Abdomen:   Soft, non-tender. Bowel sounds normal. No masses,  No organomegaly. Extremities: Extremities normal, atraumatic, no cyanosis or edema. Pulses: 2+ and symmetric all extremities. Skin: Skin color, texture, turgor normal. No rashes or lesions Lymph nodes: Cervical, supraclavicular, and axillary nodes normal.  
Neurologic: CNII-XII intact. Normal strength, sensation and reflexes throughout. Lab/Data Review: No results found for this or any previous visit (from the past 24 hour(s)). Assessment/ Plan:  
 
Principal Problem: 
  Endocarditis due to methicillin susceptible Staphylococcus aureus (MSSA) (10/23/2018) Active Problems: 
  Heroin abuse ([facility] (4/10/2018) Systolic CHF, acute on chronic ([facility] (8/30/2018) Sepsis ([facility] (10/14/2018) MSSA bacteremia (10/14/2018) Septic arthritis ([facility] (10/14/2018) Prolonged Q-T interval on ECG (10/14/2018) Transaminitis (10/14/2018) Hyponatremia (10/14/2018) Pleural effusion (10/16/2018) Overview: Loculated on the right Hypomagnesemia (10/16/2018) Hypokalemia (10/16/2018) Hypoproteinemia ([address] (10/17/2018) Acute respiratory failure ([address] (10/23/2018) 28year old female with history of CHF EF 10% diagnosed 4/18, IVDU heroin, presented to the ED with worsening shortness of breath pedal edema, with on going heroin use. She had signed out of [facility]  9/28 Where she was being treated for MSSA bacteremia/endocarditis. MSSA endocarditis: on abx. last dose tomorrow morning. d/c in am  
 
Septic arthritis: improving, on cefazolin based on c/s Systolic heart failure with EF 10 percent. continue demadex, aldactone, tropol and lisinopril Acute resp failure: resolved with diuretics, continue Pleural effusion s/p throacentesis earlier this admission  
 
heroin abuse: on methadone, d/c in am and to follow with methadone clinic Signed By: Charlene Coronado MD   
 November 4, 2018

## 2018-11-04 NOTE — PROGRESS NOTES
Problem: Falls - Risk of 
Goal: *Absence of Falls Document [place] Fall Risk and appropriate interventions in the flowsheet. Outcome: Progressing Towards Goal 
Fall Risk Interventions: 
Mobility Interventions: Assess mobility with egress test, Bed/chair exit alarm, Communicate number of staff needed for ambulation/transfer, OT consult for ADLs, PT Consult for mobility concerns, Strengthening exercises (ROM-active/passive) Medication Interventions: Assess postural VS orthostatic hypotension, Bed/chair exit alarm, Evaluate medications/consider consulting pharmacy, Patient to call before getting OOB, Teach patient to arise slowly Elimination Interventions: Bed/chair exit alarm, Call light in reach, Elevated toilet seat, Patient to call for help with toileting needs, Toilet paper/wipes in reach, Toileting schedule/hourly rounds History of Falls Interventions: Bed/chair exit alarm, Consult care management for discharge planning, Evaluate medications/consider consulting pharmacy, Room close to nurse's station

## 2018-11-05 VITALS
BODY MASS INDEX: 21.37 KG/M2 | TEMPERATURE: 97.6 F | RESPIRATION RATE: 16 BRPM | HEART RATE: 93 BPM | WEIGHT: 157.8 LBS | HEIGHT: 72 IN | DIASTOLIC BLOOD PRESSURE: 64 MMHG | OXYGEN SATURATION: 98 % | SYSTOLIC BLOOD PRESSURE: 101 MMHG

## 2018-11-05 LAB
ANION GAP SERPL CALC-SCNC: 8 MMOL/L
BASOPHILS # BLD: 0.1 K/UL (ref 0–0.2)
BASOPHILS NFR BLD: 1 % (ref 0–2)
BUN SERPL-MCNC: 42 MG/DL (ref 6–23)
CALCIUM SERPL-MCNC: 9.3 MG/DL (ref 8.3–10.4)
CHLORIDE SERPL-SCNC: 90 MMOL/L (ref 98–107)
CO2 SERPL-SCNC: 32 MMOL/L (ref 21–32)
CREAT SERPL-MCNC: 1.29 MG/DL (ref 0.6–1)
DIFFERENTIAL METHOD BLD: ABNORMAL
EOSINOPHIL # BLD: 0.2 K/UL (ref 0–0.8)
EOSINOPHIL NFR BLD: 3 % (ref 0.5–7.8)
ERYTHROCYTE [DISTWIDTH] IN BLOOD BY AUTOMATED COUNT: 21.6 %
GLUCOSE SERPL-MCNC: 154 MG/DL (ref 65–100)
HCT VFR BLD AUTO: 36.5 % (ref 35.8–46.3)
HGB BLD-MCNC: 10.8 G/DL (ref 11.7–15.4)
IMM GRANULOCYTES # BLD: 0 K/UL (ref 0–0.5)
IMM GRANULOCYTES NFR BLD AUTO: 0 % (ref 0–5)
LYMPHOCYTES # BLD: 1.8 K/UL (ref 0.5–4.6)
LYMPHOCYTES NFR BLD: 35 % (ref 13–44)
MCH RBC QN AUTO: 26.7 PG (ref 26.1–32.9)
MCHC RBC AUTO-ENTMCNC: 29.6 G/DL (ref 31.4–35)
MCV RBC AUTO: 90.1 FL (ref 79.6–97.8)
MONOCYTES # BLD: 0.7 K/UL (ref 0.1–1.3)
MONOCYTES NFR BLD: 12 % (ref 4–12)
NEUTS SEG # BLD: 2.6 K/UL (ref 1.7–8.2)
NEUTS SEG NFR BLD: 49 % (ref 43–78)
NRBC # BLD: 0 K/UL (ref 0–0.2)
PLATELET # BLD AUTO: 344 K/UL (ref 150–450)
PMV BLD AUTO: 8.5 FL (ref 9.4–12.3)
POTASSIUM SERPL-SCNC: 4.4 MMOL/L (ref 3.5–5.1)
RBC # BLD AUTO: 4.05 M/UL (ref 4.05–5.2)
SODIUM SERPL-SCNC: 130 MMOL/L (ref 136–145)
WBC # BLD AUTO: 5.3 K/UL (ref 4.3–11.1)

## 2018-11-05 PROCEDURE — 74011250636 HC RX REV CODE- 250/636: Performed by: INTERNAL MEDICINE

## 2018-11-05 PROCEDURE — 74011250637 HC RX REV CODE- 250/637: Performed by: INTERNAL MEDICINE

## 2018-11-05 PROCEDURE — 74011000250 HC RX REV CODE- 250: Performed by: FAMILY MEDICINE

## 2018-11-05 PROCEDURE — 36415 COLL VENOUS BLD VENIPUNCTURE: CPT

## 2018-11-05 PROCEDURE — 94640 AIRWAY INHALATION TREATMENT: CPT

## 2018-11-05 PROCEDURE — 85025 COMPLETE CBC W/AUTO DIFF WBC: CPT

## 2018-11-05 PROCEDURE — 94760 N-INVAS EAR/PLS OXIMETRY 1: CPT

## 2018-11-05 PROCEDURE — 80048 BASIC METABOLIC PNL TOTAL CA: CPT

## 2018-11-05 RX ORDER — LANOLIN ALCOHOL/MO/W.PET/CERES
325 CREAM (GRAM) TOPICAL
Qty: 30 TAB | Refills: 0 | Status: SHIPPED | OUTPATIENT
Start: 2018-11-05

## 2018-11-05 RX ORDER — TORSEMIDE 20 MG/1
20 TABLET ORAL 2 TIMES DAILY
Qty: 60 TAB | Refills: 0 | Status: SHIPPED | OUTPATIENT
Start: 2018-11-05

## 2018-11-05 RX ORDER — CEFAZOLIN SODIUM/WATER 2 G/20 ML
2 SYRINGE (ML) INTRAVENOUS
Status: COMPLETED | OUTPATIENT
Start: 2018-11-05 | End: 2018-11-05

## 2018-11-05 RX ORDER — LISINOPRIL 2.5 MG/1
2.5 TABLET ORAL DAILY
Qty: 30 TAB | Refills: 0 | Status: SHIPPED | OUTPATIENT
Start: 2018-11-06

## 2018-11-05 RX ORDER — SPIRONOLACTONE 50 MG/1
50 TABLET, FILM COATED ORAL 2 TIMES DAILY
Qty: 30 TAB | Refills: 0 | Status: SHIPPED | OUTPATIENT
Start: 2018-11-05

## 2018-11-05 RX ORDER — CEPHALEXIN 500 MG/1
500 CAPSULE ORAL 3 TIMES DAILY
Status: DISCONTINUED | OUTPATIENT
Start: 2018-11-05 | End: 2018-11-05 | Stop reason: HOSPADM

## 2018-11-05 RX ORDER — POTASSIUM CHLORIDE 20 MEQ/1
20 TABLET, EXTENDED RELEASE ORAL DAILY
Qty: 30 TAB | Refills: 0 | Status: SHIPPED | OUTPATIENT
Start: 2018-11-06

## 2018-11-05 RX ORDER — CEPHALEXIN 500 MG/1
500 CAPSULE ORAL 3 TIMES DAILY
Qty: 90 CAP | Refills: 0 | Status: SHIPPED | OUTPATIENT
Start: 2018-11-05 | End: 2018-12-05

## 2018-11-05 RX ORDER — METOPROLOL SUCCINATE 25 MG/1
25 TABLET, EXTENDED RELEASE ORAL EVERY EVENING
Qty: 30 TAB | Refills: 0 | Status: SHIPPED | OUTPATIENT
Start: 2018-11-05

## 2018-11-05 RX ORDER — METHADONE HYDROCHLORIDE 10 MG/1
30 TABLET ORAL ONCE
Status: COMPLETED | OUTPATIENT
Start: 2018-11-05 | End: 2018-11-05

## 2018-11-05 RX ADMIN — TORSEMIDE 20 MG: 20 TABLET ORAL at 08:14

## 2018-11-05 RX ADMIN — ONDANSETRON HYDROCHLORIDE 4 MG: 2 INJECTION INTRAMUSCULAR; INTRAVENOUS at 08:44

## 2018-11-05 RX ADMIN — METHADONE HYDROCHLORIDE 30 MG: 10 TABLET ORAL at 11:52

## 2018-11-05 RX ADMIN — Medication 10 ML: at 06:14

## 2018-11-05 RX ADMIN — Medication 10 ML: at 08:46

## 2018-11-05 RX ADMIN — METHADONE HYDROCHLORIDE 30 MG: 10 TABLET ORAL at 08:13

## 2018-11-05 RX ADMIN — PANTOPRAZOLE SODIUM 40 MG: 40 TABLET, DELAYED RELEASE ORAL at 08:14

## 2018-11-05 RX ADMIN — ONDANSETRON HYDROCHLORIDE 4 MG: 2 INJECTION INTRAMUSCULAR; INTRAVENOUS at 01:13

## 2018-11-05 RX ADMIN — CEPHALEXIN 500 MG: 500 CAPSULE ORAL at 08:14

## 2018-11-05 RX ADMIN — HEPARIN SODIUM 5000 UNITS: 5000 INJECTION INTRAVENOUS; SUBCUTANEOUS at 06:10

## 2018-11-05 RX ADMIN — FERROUS SULFATE TAB 325 MG (65 MG ELEMENTAL FE) 325 MG: 325 (65 FE) TAB at 11:52

## 2018-11-05 RX ADMIN — ONDANSETRON HYDROCHLORIDE 4 MG: 2 INJECTION INTRAMUSCULAR; INTRAVENOUS at 08:12

## 2018-11-05 RX ADMIN — FERROUS SULFATE TAB 325 MG (65 MG ELEMENTAL FE) 325 MG: 325 (65 FE) TAB at 08:14

## 2018-11-05 RX ADMIN — Medication 2 G: at 01:18

## 2018-11-05 RX ADMIN — Medication 2 G: at 08:44

## 2018-11-05 RX ADMIN — POTASSIUM CHLORIDE 20 MEQ: 20 TABLET, EXTENDED RELEASE ORAL at 08:14

## 2018-11-05 RX ADMIN — Medication 10 ML: at 06:13

## 2018-11-05 RX ADMIN — ALBUTEROL SULFATE 2.5 MG: 2.5 SOLUTION RESPIRATORY (INHALATION) at 07:42

## 2018-11-05 RX ADMIN — Medication 400 MG: at 08:15

## 2018-11-05 RX ADMIN — LISINOPRIL 2.5 MG: 5 TABLET ORAL at 08:15

## 2018-11-05 RX ADMIN — SPIRONOLACTONE 50 MG: 25 TABLET ORAL at 08:14

## 2018-11-05 NOTE — PROGRESS NOTES
Discharge instructions were reviewed with the patient. Opportunity for questions given. Patient verbalized understanding of discharge and follow up instructions. Midline was removed. Prescriptions provided. Pt waiting on ride.

## 2018-11-05 NOTE — PROGRESS NOTES
Assessment complete via flow sheet. Pt A&Ox3, sitting in bed playing games on phone. Respirations even and unlabored. S1 S2 auscultated. Pt on room air. Pt reports pain level of 0 out of 10. Bowel sounds active, abdomen soft. Denies other needs. Bed in lowest position, side rails up 3, call bell in reach. Instructed to call for assistance. Pt verbalized understandinAg. Plan of care reviewed with patient. Pt eager to be discharged.

## 2018-11-05 NOTE — PROGRESS NOTES
Problem: Falls - Risk of 
Goal: *Absence of Falls Document Ayleen Elizalde Fall Risk and appropriate interventions in the flowsheet. Outcome: Progressing Towards Goal 
Fall Risk Interventions: 
Mobility Interventions: Bed/chair exit alarm, Patient to call before getting OOB, PT Consult for mobility concerns Medication Interventions: Assess postural VS orthostatic hypotension, Patient to call before getting OOB, Teach patient to arise slowly Elimination Interventions: Call light in reach, Patient to call for help with toileting needs History of Falls Interventions: Bed/chair exit alarm, Consult care management for discharge planning, Evaluate medications/consider consulting pharmacy, Room close to nurse's station

## 2018-11-05 NOTE — PROGRESS NOTES
Assessment complete via flow sheet. Pt A &O x4. Olegario Farooq Respirations  unlabored. S1 S2 auscultated. Pt reports pain level of 0 out of 10. Bowel sounds active. Abdomen soft. Denies other needs. Bed in lowest position; call bell in reach. Instructed to call for assistance. Staff member at bedside.

## 2018-11-05 NOTE — PROGRESS NOTES
Cm visited patient regarding discharge plan. Cindy submitted [facility] application (pending) pt state she will be moving out of state soon. CM advised pt to contact W4  If her address changes. CINDY also set up appointment at [facility] on November 13 at 10:00am for patient Pt is aware and state she will be going to her appointment.

## 2018-11-05 NOTE — PROGRESS NOTES
Problem: Nutrition Deficit Goal: *Optimize nutritional status Nutrition Follow Up: 
Reason for initial assessment: BPA - Malnutrition Screening Tool positive for unintended weight loss > 33# and eating poorly r/t decreased appetite Consult received 10/16 for General Nutrition Management and Supplement per Dr. Radha Reyes.  
  
Assessment:  
Admitted with sepsis, heroin abuse, systolic CHF, MSSA bacteremia. PMH: CHF, IV drug abuse, R sacroiliac arthritis, MSSA/endocarditis. Thoracentesis 10/26.  
  
Pt reports her intake has remained similar to last visit, denies any difficulties with po intake. Plan to D/C to home today. Pt with appropriate questions re: sodium information provided to her from nursing. References previously working in the [employer] and being aware of sodium levels there secondary to customers requesting information.  
  
Current Diet:  Cardiac with Ensure Enlive TID (frequeny increased by 10/26) 
  
Pertinent Labs:  Na 130, gluc ; Pt on electrolyte replacement protocols  
  
Anthropometrics:Height: 6' (182.9 cm),  Weight: 88.5 kg (195 lb), unspecified, Body mass index is 26.45 kg/(m^2). BMI class of overweight. Weight 10/16:  89.4 kg-weight source not specified.  Pt with 2+ LE edema bilaterally.   Limited validity of BMI secondary significant edema. Weight 10/18:  86.5 kg-bed source Weight 10/19:  65.8 kg-bed source  Pt with pedal edema 2+ Weight 10/28:  77.5 kg-bed source, Edema 1+ bilateral LE Weight 11/4: 71.6kg - standing scale, trace edema bilateral LE 
NFPE: Mild clavicle muscle loss.   
Malnutrition Criteria: ASPEN Malnutrition Criteria Acute Illness, Chronic Illness, or Social/Enviornmental: Social/environmental illness Weight Loss: 7.5% x 3 month Muscle Mass: Mild Fluid Accumulation: Severe ASPEN Malnutrition Score - Social/Environmental Illness: 8 Social/Environmental Illness - Malnutrition Diagnosis: Moderate malnutrition  
  
Macronutrient needs: Wt used: 73kg EER:  5485-7846 kcal /day (25-30 kcal/kg ideal BW) EPR:  73-88 grams protein/day (1-1.2 grams/kg ideal BW) 
  
Intake/Comparative Standards:  No recorded intake since last visit.  Based on verbalized intake of foods and supplements, patient continues to potentially meets 100% estimated kcal and protein needs. .  
  
Intervention: 
Meals and snacks: Continue current diet. Reinforced ability to order \"breakfast\" type foods for evening meal if she prefers that type of food. Nutrition supplement therapy:  Continue Ensure Enlive TID inpatient - pt anticipates ability to meet needs at home without ONS Nutrition education: Provided patient with written and verbal instruction on 2 gram sodium diet with 2,000ml fluid restriction. Handouts provided: Low sodium, tips for fluid restriction cardiac. Patient verbalizes good understanding of diet. Expect fair compliance. Discharge Nutrition Plan: No discharge needs at this time.   
Dryden Jeet [address]

## 2018-11-05 NOTE — DISCHARGE SUMMARY
Hospitalist Discharge Summary     Patient ID:  Gaby Ashford  [id number]  54 y.o.  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 susceptible Staphylococcus aureus (MSSA) (10/23/2018)    Active Problems:    Heroin abuse ([address] (7/32/9622)      Systolic CHF, acute on chronic ([address] (8/30/2018)      Sepsis ([address] (10/14/2018)      MSSA bacteremia (10/14/2018)      Septic arthritis ([address] (10/14/2018)      Prolonged Q-T interval on ECG (10/14/2018)      Transaminitis (10/14/2018)      Hyponatremia (10/14/2018)      Pleural effusion (10/16/2018)      Overview: Loculated on the right      Hypomagnesemia (10/16/2018)      Hypokalemia (10/16/2018)      Hypoproteinemia ([address] (10/17/2018)      Acute respiratory failure ([address] (10/23/2018)             Hospital Course:  Please refer to the admission H&P for details of presentation. In summary, the patient is     28 y.o. female who presented to the ED for a cc of SOB and LE edema. 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 Patient originally treated for bacteremia/endocarditis at [facility] with nafcillin ending treatment 10/22/18. Patient used IV heroin via her PICC line while at [facility] and left AMA once they discontinued her PICC line. Patient was admitted here and was started on tx on 10/14/18.  Was evaluated by ID, started on IV abx and continued treatment until today 11/5/2018, started on PO keflex TID to be continued for 6 more weeks per ID. given scripts for 4 weeks. case management arranging follow up for her. compliance remains questionable     Patient was ind ecompensated heart failure, was eval by cardiology. Her meds were optimized and currently d/c home on PO meds. CM arranging [facility] to help with her medications. SHe also had pleural effusion earlier during hospital stay for which she underwent thoracentesis. Patient clinically better. She is on methadone for withdrawal and will follow up with methadone clinic in am. She received 60 mg of methadone this am.     Patient clinically euvolemic and vitals stable. d/c home today with abx as above. Needs close follow up and repeat labs for monitoring in a week     Givern her addiction and lack of insurance follow up remains an issue. Case management has been working to provide her meds through [facility] and arrange for her follow up visits     Significant Diagnostic Studies:       Labs: Results:       Chemistry Recent Labs     11/05/18  0602   *   *   K 4.4   CL 90*   CO2 32   BUN 42*   CREA 1.29*   CA 9.3   AGAP 8      CBC w/Diff Recent Labs     11/05/18  0602   WBC 5.3   RBC 4.05   HGB 10.8*   HCT 36.5      GRANS 49   LYMPH 35   EOS 3      Cardiac Enzymes No results for input(s): CPK, CKND1, SERGEY in the last 72 hours. No lab exists for component: CKRMB, TROIP   Coagulation No results for input(s): PTP, INR, APTT in the last 72 hours. No lab exists for component: INREXT    Lipid Panel No results found for: CHOL, CHOLPOCT, CHOLX, CHLST, CHOLV, 294439, HDL, LDL, LDLC, DLDLP, 061846, VLDLC, VLDL, TGLX, TRIGL, TRIGP, TGLPOCT, CHHD, CHHDX   BNP No results for input(s): BNPP in the last 72 hours. Liver Enzymes No results for input(s): TP, ALB, TBIL, AP, SGOT, GPT in the last 72 hours.     No lab exists for component: DBIL Thyroid Studies No results found for: T4, T3U, TSH, TSHEXT         Discharge Exam:  Visit Vitals  /64 (BP 1 Location: Right arm, BP Patient Position: At rest;Supine)   Pulse 93   Temp 97.6 °F (36.4 °C)   Resp 16   Ht 6' (1.829 m)   Wt 71.6 kg (157 lb 12.8 oz)   SpO2 98%   Breastfeeding? No   BMI 21.40 kg/m²     General appearance: alert, cooperative, no distress, appears stated age  Lungs: clear to auscultation bilaterally  Heart: regular rate and rhythm, S1, S2 normal, no murmur, click, rub or gallop  Abdomen: soft, non-tender. Bowel sounds normal. No masses,  no organomegaly  Extremities: no cyanosis or edema  Neurologic: Grossly normal    Disposition:  Discharge Condition: stable  Patient Instructions:   Current Discharge Medication List      START taking these medications    Details   ferrous sulfate 325 mg (65 mg iron) tablet Take 1 Tab by mouth three (3) times daily (with meals). Qty: 30 Tab, Refills: 0      metoprolol succinate (TOPROL-XL) 25 mg XL tablet Take 1 Tab by mouth every evening. Qty: 30 Tab, Refills: 0      potassium chloride (K-DUR, KLOR-CON) 20 mEq tablet Take 1 Tab by mouth daily. Qty: 30 Tab, Refills: 0      torsemide (DEMADEX) 20 mg tablet Take 1 Tab by mouth two (2) times a day. Qty: 60 Tab, Refills: 0      cephALEXin (KEFLEX) 500 mg capsule Take 1 Cap by mouth three (3) times daily for 30 days. Qty: 90 Cap, Refills: 0         CONTINUE these medications which have CHANGED    Details   spironolactone (ALDACTONE) 50 mg tablet Take 1 Tab by mouth two (2) times a day. Qty: 30 Tab, Refills: 0      lisinopril (PRINIVIL, ZESTRIL) 2.5 mg tablet Take 1 Tab by mouth daily.   Qty: 30 Tab, Refills: 0         STOP taking these medications       furosemide (LASIX) 40 mg tablet Comments:   Reason for Stopping:         carvedilol (COREG) 3.125 mg tablet Comments:   Reason for Stopping:         methadone HCl (METHADONE PO) Comments:   Reason for Stopping:               Activity:  Diet:  Wound Care:    Follow-up  ·     Time spent to discharge patient 35 minutes  Signed:  Zoe Higgins MD  11/5/2018  10:40 AM

## 2018-11-05 NOTE — DISCHARGE INSTRUCTIONS
DISCHARGE SUMMARY from Nurse    PATIENT INSTRUCTIONS:    After general anesthesia or intravenous sedation, for 24 hours or while taking prescription Narcotics:  · Limit your activities  · Do not drive and operate hazardous machinery  · Do not make important personal or business decisions  · Do  not drink alcoholic beverages  · If you have not urinated within 8 hours after discharge, please contact your surgeon on call. Report the following to your surgeon:  · Excessive pain, swelling, redness or odor of or around the surgical area  · Temperature over 100.5  · Nausea and vomiting lasting longer than 4 hours or if unable to take medications  · Any signs of decreased circulation or nerve impairment to extremity: change in color, persistent  numbness, tingling, coldness or increase pain  · Any questions    What to do at Home:  Recommended activity: Activity as tolerated, complete PO antibiotics, keep f/u appts     If you experience any of the following symptoms shortness of breath/chest pain, high fever, any other issues, please follow up with PCP. *  Please give a list of your current medications to your Primary Care Provider. *  Please update this list whenever your medications are discontinued, doses are      changed, or new medications (including over-the-counter products) are added. *  Please carry medication information at all times in case of emergency situations. These are general instructions for a healthy lifestyle:    No smoking/ No tobacco products/ Avoid exposure to second hand smoke  Surgeon General's Warning:  Quitting smoking now greatly reduces serious risk to your health.     Obesity, smoking, and sedentary lifestyle greatly increases your risk for illness    A healthy diet, regular physical exercise & weight monitoring are important for maintaining a healthy lifestyle    You may be retaining fluid if you have a history of heart failure or if you experience any of the following symptoms:  Weight gain of 3 pounds or more overnight or 5 pounds in a week, increased swelling in our hands or feet or shortness of breath while lying flat in bed. Please call your doctor as soon as you notice any of these symptoms; do not wait until your next office visit. Recognize signs and symptoms of STROKE:    F-face looks uneven    A-arms unable to move or move unevenly    S-speech slurred or non-existent    T-time-call 911 as soon as signs and symptoms begin-DO NOT go       Back to bed or wait to see if you get better-TIME IS BRAIN. Warning Signs of HEART ATTACK     Call 911 if you have these symptoms:   Chest discomfort. Most heart attacks involve discomfort in the center of the chest that lasts more than a few minutes, or that goes away and comes back. It can feel like uncomfortable pressure, squeezing, fullness, or pain.  Discomfort in other areas of the upper body. Symptoms can include pain or discomfort in one or both arms, the back, neck, jaw, or stomach.  Shortness of breath with or without chest discomfort.  Other signs may include breaking out in a cold sweat, nausea, or lightheadedness. Don't wait more than five minutes to call 911 - MINUTES MATTER! Fast action can save your life. Calling 911 is almost always the fastest way to get lifesaving treatment. Emergency Medical Services staff can begin treatment when they arrive -- up to an hour sooner than if someone gets to the hospital by car. The discharge information has been reviewed with the patient. The patient verbalized understanding. Discharge medications reviewed with the patient and appropriate educational materials and side effects teaching were provided.   ___________________________________________________________________________________________________________________________________     Bacterial Endocarditis: Care Instructions  Your Care Instructions    Bacterial endocarditis (say \"nx-egf-bnr-DY-tus\") is an infection of the heart valves or inner lining of the heart. It is caused by bacteria that enter the bloodstream and settle on one or more of the heart valves. This can damage the valves. In some cases, surgery is needed to replace a damaged valve. Antibiotics can cure bacterial endocarditis. You may take antibiotics for several weeks. Now that you have had the infection, you are at risk for getting it again. It is important that you let all your other doctors know that you have had bacterial endocarditis. Let your dentist know too. Follow-up care is a key part of your treatment and safety. Be sure to make and go to all appointments, and call your doctor if you are having problems. It's also a good idea to know your test results and keep a list of the medicines you take. How can you care for yourself at home? · If you are taking IV antibiotics at home with the help of a home health nurse, the nurse will teach you how to use the antibiotics and how to care for your IV catheter. Make sure you are comfortable using and caring for the IV. · If your doctor prescribed antibiotic pills, take them exactly as directed. Do not stop taking them just because you feel better. You need to take the full course of antibiotics. · In the future, you may have to take antibiotics before certain medical, dental, or surgical procedures. Ask your doctor or dentist about this. Do not have any of these procedures without talking to your doctor or dentist first. Your doctor can give you a card to carry in your wallet which states that you need preventive antibiotics before certain procedures. · Practice good oral hygiene. Brush and floss your teeth daily. Visit a dentist twice each year. Make sure your dentist knows that you have had endocarditis. When should you call for help? Call 911 anytime you think you may need emergency care. For example, call if:    · You have symptoms of sudden heart failure.  These may include:  ? Severe trouble breathing. ? A fast or irregular heartbeat. ? Coughing up pink, foamy mucus. ? You passed out.     · You have symptoms of a stroke. These may include:  ? Sudden numbness, tingling, weakness, or loss of movement in your face, arm, or leg, especially on only one side of your body. ? Sudden vision changes. ? Sudden trouble speaking. ? Sudden confusion or trouble understanding simple statements. ? Sudden problems with walking or balance. ? A sudden, severe headache that is different from past headaches.    Call your doctor now or seek immediate medical care if:    · You have a new or higher fever.    Watch closely for changes in your health, and be sure to contact your doctor if:    · You have skin or nail changes.     · You do not get better as expected. Where can you learn more? Go to http://matt-yu.info/. Enter R299 in the search box to learn more about \"Bacterial Endocarditis: Care Instructions. \"  Current as of: December 6, 2017  Content Version: 11.8  © 9293-5432 [employer]. Care instructions adapted under license by [employer] (which disclaims liability or warranty for this information). If you have questions about a medical condition or this instruction, always ask your healthcare professional. [place] [address] any warranty or liability for your use of this information.

## 2018-11-05 NOTE — PROGRESS NOTES
Discussed with Dr. Jamie Hill, recommend 6 more weeks oral Keflex.   Followup with ID 12/17/2018 11:00 am.

## 2018-11-05 NOTE — PROGRESS NOTES
Per MD pt stable for d/c. Please see Jessica Rivas note for info on [facility] and pt's f/u appt. DOMINIQUE spoke with pt who states plans to leave the state ( going to [place]) within the next 30 days. We discussed with pt the need for f/u appt & also completion of [employer] application in the event pt's plans to relocate change. Pt agreeable. SW explained to pt that she would receive a voucher for all her medications and where to go and p/u. DOMINIQUE faxed scripts and copy of voucher to Edwina Rodriguez at [address] [facility].

## 2018-11-15 LAB
FUNGUS CULTURE, RFCO2T: NORMAL
FUNGUS SMEAR, RFCO1T: NORMAL
FUNGUS SPEC CULT: NORMAL
FUNGUS STAIN, 188244: NORMAL
REFLEX TO ID, RFCO3T: NORMAL
SPECIMEN SOURCE: NORMAL
SPECIMEN SOURCE: NORMAL

## 2018-11-29 LAB
ACID FAST STN SPEC: NEGATIVE
MYCOBACTERIUM SPEC QL CULT: NEGATIVE
SPECIMEN PREPARATION: NORMAL
SPECIMEN SOURCE: NORMAL

## 2024-01-27 NOTE — ROUTINE PROCESS
CHF teaching started post introduction to pt.; aware of diagnosis. Planner/scale (home) @ [facility] and will follow. Smoking/ ETOH/Illicit drug use cessation and maintain a healthy weight covered. Pt. aware that I can not prescribe nor adjust  medications: 15mins Palliative Care score:  RATT 17, none Refused ACP on admission Start 2L/D Fluid restriction/ cardiac diet CHF teaching continues to pt. . Emphasis on taking prescription meds as ordered, to keep F/U appts and to call MD STAT . 
  
CHF teaching completed, verbalize emphasis on monitoring self and report to MD: 
· If you gain 2 lbs in one day or 5 lbs in a week, and short of breath. · If you can not lay flat without developing short of breath or rapid breathing at night; or if it wakes you up. Develop a cough or wheezing. Cardiac / low salt diet and Fluid restrictions. Pass post test: 1hr total 
 
 
F/U in [place] [facility]: list of [place] local providers given to pt. Patent